# Patient Record
Sex: MALE | Race: OTHER | HISPANIC OR LATINO | Employment: OTHER | URBAN - METROPOLITAN AREA
[De-identification: names, ages, dates, MRNs, and addresses within clinical notes are randomized per-mention and may not be internally consistent; named-entity substitution may affect disease eponyms.]

---

## 2017-01-24 ENCOUNTER — HOSPITAL ENCOUNTER (EMERGENCY)
Facility: HOSPITAL | Age: 52
Discharge: HOME | End: 2017-01-24
Attending: EMERGENCY MEDICINE | Admitting: EMERGENCY MEDICINE
Payer: MEDICARE

## 2017-01-24 ENCOUNTER — APPOINTMENT (EMERGENCY)
Dept: RADIOLOGY | Facility: HOSPITAL | Age: 52
End: 2017-01-24
Payer: MEDICARE

## 2017-01-24 VITALS
BODY MASS INDEX: 24.86 KG/M2 | DIASTOLIC BLOOD PRESSURE: 65 MMHG | HEART RATE: 56 BPM | OXYGEN SATURATION: 99 % | TEMPERATURE: 98.4 F | SYSTOLIC BLOOD PRESSURE: 113 MMHG | RESPIRATION RATE: 16 BRPM | HEIGHT: 68 IN | WEIGHT: 164 LBS

## 2017-01-24 DIAGNOSIS — B34.9 VIRAL SYNDROME: Primary | ICD-10-CM

## 2017-01-24 LAB
ANION GAP SERPL CALCULATED.3IONS-SCNC: 8 MMOL/L (ref 4–13)
BASOPHILS # BLD AUTO: 0.02 THOUSANDS/ΜL (ref 0–0.1)
BASOPHILS NFR BLD AUTO: 0 % (ref 0–1)
BUN SERPL-MCNC: 15 MG/DL (ref 5–25)
CALCIUM SERPL-MCNC: 9.2 MG/DL (ref 8.3–10.1)
CHLORIDE SERPL-SCNC: 103 MMOL/L (ref 100–108)
CO2 SERPL-SCNC: 28 MMOL/L (ref 21–32)
CREAT SERPL-MCNC: 0.88 MG/DL (ref 0.6–1.3)
EOSINOPHIL # BLD AUTO: 0.22 THOUSAND/ΜL (ref 0–0.61)
EOSINOPHIL NFR BLD AUTO: 4 % (ref 0–6)
ERYTHROCYTE [DISTWIDTH] IN BLOOD BY AUTOMATED COUNT: 12.8 % (ref 11.6–15.1)
GFR SERPL CREATININE-BSD FRML MDRD: >60 ML/MIN/1.73SQ M
GLUCOSE SERPL-MCNC: 79 MG/DL (ref 65–140)
HCT VFR BLD AUTO: 47.2 % (ref 36.5–49.3)
HGB BLD-MCNC: 16.4 G/DL (ref 12–17)
LYMPHOCYTES # BLD AUTO: 1.09 THOUSANDS/ΜL (ref 0.6–4.47)
LYMPHOCYTES NFR BLD AUTO: 22 % (ref 14–44)
MCH RBC QN AUTO: 30.3 PG (ref 26.8–34.3)
MCHC RBC AUTO-ENTMCNC: 34.7 G/DL (ref 31.4–37.4)
MCV RBC AUTO: 87 FL (ref 82–98)
MONOCYTES # BLD AUTO: 0.6 THOUSAND/ΜL (ref 0.17–1.22)
MONOCYTES NFR BLD AUTO: 12 % (ref 4–12)
NEUTROPHILS # BLD AUTO: 3.12 THOUSANDS/ΜL (ref 1.85–7.62)
NEUTS SEG NFR BLD AUTO: 62 % (ref 43–75)
NRBC BLD AUTO-RTO: 0 /100 WBCS
PLATELET # BLD AUTO: 133 THOUSANDS/UL (ref 149–390)
PMV BLD AUTO: 10.6 FL (ref 8.9–12.7)
POTASSIUM SERPL-SCNC: 3.8 MMOL/L (ref 3.5–5.3)
RBC # BLD AUTO: 5.42 MILLION/UL (ref 3.88–5.62)
SODIUM SERPL-SCNC: 139 MMOL/L (ref 136–145)
WBC # BLD AUTO: 5.06 THOUSAND/UL (ref 4.31–10.16)

## 2017-01-24 PROCEDURE — 71020 HB CHEST X-RAY 2VW FRONTAL&LATL: CPT

## 2017-01-24 PROCEDURE — 96361 HYDRATE IV INFUSION ADD-ON: CPT

## 2017-01-24 PROCEDURE — 96375 TX/PRO/DX INJ NEW DRUG ADDON: CPT

## 2017-01-24 PROCEDURE — 85025 COMPLETE CBC W/AUTO DIFF WBC: CPT | Performed by: EMERGENCY MEDICINE

## 2017-01-24 PROCEDURE — 99283 EMERGENCY DEPT VISIT LOW MDM: CPT

## 2017-01-24 PROCEDURE — 80048 BASIC METABOLIC PNL TOTAL CA: CPT | Performed by: EMERGENCY MEDICINE

## 2017-01-24 PROCEDURE — 36415 COLL VENOUS BLD VENIPUNCTURE: CPT | Performed by: EMERGENCY MEDICINE

## 2017-01-24 PROCEDURE — 96374 THER/PROPH/DIAG INJ IV PUSH: CPT

## 2017-01-24 RX ORDER — KETOROLAC TROMETHAMINE 30 MG/ML
15 INJECTION, SOLUTION INTRAMUSCULAR; INTRAVENOUS ONCE
Status: COMPLETED | OUTPATIENT
Start: 2017-01-24 | End: 2017-01-24

## 2017-01-24 RX ORDER — ONDANSETRON 4 MG/1
4 TABLET, FILM COATED ORAL EVERY 6 HOURS
Qty: 20 TABLET | Refills: 0 | Status: SHIPPED | OUTPATIENT
Start: 2017-01-24 | End: 2017-10-28

## 2017-01-24 RX ORDER — ONDANSETRON 2 MG/ML
4 INJECTION INTRAMUSCULAR; INTRAVENOUS ONCE
Status: COMPLETED | OUTPATIENT
Start: 2017-01-24 | End: 2017-01-24

## 2017-01-24 RX ADMIN — SODIUM CHLORIDE 1000 ML: 0.9 INJECTION, SOLUTION INTRAVENOUS at 15:19

## 2017-01-24 RX ADMIN — ONDANSETRON 4 MG: 2 INJECTION INTRAMUSCULAR; INTRAVENOUS at 15:49

## 2017-01-24 RX ADMIN — KETOROLAC TROMETHAMINE 15 MG: 30 INJECTION, SOLUTION INTRAMUSCULAR at 15:48

## 2017-01-31 ENCOUNTER — TRANSCRIBE ORDERS (OUTPATIENT)
Dept: LAB | Facility: HOSPITAL | Age: 52
End: 2017-01-31

## 2017-01-31 ENCOUNTER — APPOINTMENT (OUTPATIENT)
Dept: LAB | Facility: HOSPITAL | Age: 52
End: 2017-01-31
Payer: MEDICARE

## 2017-01-31 DIAGNOSIS — E78.00 PURE HYPERCHOLESTEROLEMIA: ICD-10-CM

## 2017-01-31 LAB
ALBUMIN SERPL BCP-MCNC: 3.7 G/DL (ref 3.5–5)
ALP SERPL-CCNC: 54 U/L (ref 46–116)
ALT SERPL W P-5'-P-CCNC: 29 U/L (ref 12–78)
ANION GAP SERPL CALCULATED.3IONS-SCNC: 8 MMOL/L (ref 4–13)
AST SERPL W P-5'-P-CCNC: 14 U/L (ref 5–45)
BASOPHILS # BLD AUTO: 0.01 THOUSANDS/ΜL (ref 0–0.1)
BASOPHILS NFR BLD AUTO: 0 % (ref 0–1)
BILIRUB SERPL-MCNC: 0.49 MG/DL (ref 0.2–1)
BUN SERPL-MCNC: 20 MG/DL (ref 5–25)
CALCIUM SERPL-MCNC: 8.8 MG/DL (ref 8.3–10.1)
CHLORIDE SERPL-SCNC: 106 MMOL/L (ref 100–108)
CHOLEST SERPL-MCNC: 162 MG/DL (ref 50–200)
CO2 SERPL-SCNC: 27 MMOL/L (ref 21–32)
CREAT SERPL-MCNC: 0.91 MG/DL (ref 0.6–1.3)
EOSINOPHIL # BLD AUTO: 0.7 THOUSAND/ΜL (ref 0–0.61)
EOSINOPHIL NFR BLD AUTO: 7 % (ref 0–6)
ERYTHROCYTE [DISTWIDTH] IN BLOOD BY AUTOMATED COUNT: 12.7 % (ref 11.6–15.1)
GFR SERPL CREATININE-BSD FRML MDRD: >60 ML/MIN/1.73SQ M
GLUCOSE SERPL-MCNC: 83 MG/DL (ref 65–140)
HCT VFR BLD AUTO: 45.4 % (ref 36.5–49.3)
HDLC SERPL-MCNC: 42 MG/DL (ref 40–60)
HGB BLD-MCNC: 15.7 G/DL (ref 12–17)
LDLC SERPL CALC-MCNC: 104 MG/DL (ref 0–100)
LYMPHOCYTES # BLD AUTO: 4.32 THOUSANDS/ΜL (ref 0.6–4.47)
LYMPHOCYTES NFR BLD AUTO: 46 % (ref 14–44)
MCH RBC QN AUTO: 30.4 PG (ref 26.8–34.3)
MCHC RBC AUTO-ENTMCNC: 34.6 G/DL (ref 31.4–37.4)
MCV RBC AUTO: 88 FL (ref 82–98)
MONOCYTES # BLD AUTO: 0.52 THOUSAND/ΜL (ref 0.17–1.22)
MONOCYTES NFR BLD AUTO: 5 % (ref 4–12)
NEUTROPHILS # BLD AUTO: 4.04 THOUSANDS/ΜL (ref 1.85–7.62)
NEUTS SEG NFR BLD AUTO: 42 % (ref 43–75)
NRBC BLD AUTO-RTO: 0 /100 WBCS
PLATELET # BLD AUTO: 245 THOUSANDS/UL (ref 149–390)
PMV BLD AUTO: 10.6 FL (ref 8.9–12.7)
POTASSIUM SERPL-SCNC: 3.8 MMOL/L (ref 3.5–5.3)
PROT SERPL-MCNC: 7.3 G/DL (ref 6.4–8.2)
RBC # BLD AUTO: 5.17 MILLION/UL (ref 3.88–5.62)
SODIUM SERPL-SCNC: 141 MMOL/L (ref 136–145)
TRIGL SERPL-MCNC: 78 MG/DL
TSH SERPL DL<=0.05 MIU/L-ACNC: 2.05 UIU/ML (ref 0.36–3.74)
WBC # BLD AUTO: 9.66 THOUSAND/UL (ref 4.31–10.16)

## 2017-01-31 PROCEDURE — 80053 COMPREHEN METABOLIC PANEL: CPT

## 2017-01-31 PROCEDURE — 84443 ASSAY THYROID STIM HORMONE: CPT

## 2017-01-31 PROCEDURE — 36415 COLL VENOUS BLD VENIPUNCTURE: CPT

## 2017-01-31 PROCEDURE — 80061 LIPID PANEL: CPT

## 2017-01-31 PROCEDURE — 85025 COMPLETE CBC W/AUTO DIFF WBC: CPT

## 2017-02-04 ENCOUNTER — GENERIC CONVERSION - ENCOUNTER (OUTPATIENT)
Dept: OTHER | Facility: OTHER | Age: 52
End: 2017-02-04

## 2017-04-07 ENCOUNTER — ALLSCRIPTS OFFICE VISIT (OUTPATIENT)
Dept: OTHER | Facility: OTHER | Age: 52
End: 2017-04-07

## 2017-05-05 ENCOUNTER — ALLSCRIPTS OFFICE VISIT (OUTPATIENT)
Dept: OTHER | Facility: OTHER | Age: 52
End: 2017-05-05

## 2017-06-20 ENCOUNTER — ALLSCRIPTS OFFICE VISIT (OUTPATIENT)
Dept: OTHER | Facility: OTHER | Age: 52
End: 2017-06-20

## 2017-06-20 DIAGNOSIS — Z12.5 ENCOUNTER FOR SCREENING FOR MALIGNANT NEOPLASM OF PROSTATE: ICD-10-CM

## 2017-06-20 DIAGNOSIS — N64.4 MASTODYNIA: ICD-10-CM

## 2017-06-20 DIAGNOSIS — N62 HYPERTROPHY OF BREAST: ICD-10-CM

## 2017-06-20 DIAGNOSIS — E78.00 PURE HYPERCHOLESTEROLEMIA: ICD-10-CM

## 2017-06-20 DIAGNOSIS — N63.0 BREAST LUMP: ICD-10-CM

## 2017-06-20 DIAGNOSIS — M54.50 LOW BACK PAIN: ICD-10-CM

## 2017-06-30 ENCOUNTER — GENERIC CONVERSION - ENCOUNTER (OUTPATIENT)
Dept: OTHER | Facility: OTHER | Age: 52
End: 2017-06-30

## 2017-06-30 ENCOUNTER — HOSPITAL ENCOUNTER (OUTPATIENT)
Dept: ULTRASOUND IMAGING | Facility: CLINIC | Age: 52
Discharge: HOME | End: 2017-06-30
Payer: MEDICARE

## 2017-06-30 ENCOUNTER — HOSPITAL ENCOUNTER (OUTPATIENT)
Dept: MAMMOGRAPHY | Facility: CLINIC | Age: 52
Discharge: HOME | End: 2017-06-30
Payer: MEDICARE

## 2017-06-30 DIAGNOSIS — N64.4 MASTODYNIA: ICD-10-CM

## 2017-06-30 DIAGNOSIS — IMO0002 LUMP: ICD-10-CM

## 2017-06-30 DIAGNOSIS — N63.0 BREAST LUMP: ICD-10-CM

## 2017-06-30 PROCEDURE — G0279 TOMOSYNTHESIS, MAMMO: HCPCS

## 2017-06-30 PROCEDURE — 76642 ULTRASOUND BREAST LIMITED: CPT

## 2017-06-30 PROCEDURE — G0204 DX MAMMO INCL CAD BI: HCPCS

## 2017-07-19 ENCOUNTER — ALLSCRIPTS OFFICE VISIT (OUTPATIENT)
Dept: OTHER | Facility: OTHER | Age: 52
End: 2017-07-19

## 2017-10-28 ENCOUNTER — HOSPITAL ENCOUNTER (OUTPATIENT)
Facility: HOSPITAL | Age: 52
Setting detail: OBSERVATION
Discharge: HOME | End: 2017-10-29
Attending: EMERGENCY MEDICINE | Admitting: INTERNAL MEDICINE
Payer: MEDICARE

## 2017-10-28 ENCOUNTER — APPOINTMENT (EMERGENCY)
Dept: RADIOLOGY | Facility: HOSPITAL | Age: 52
End: 2017-10-28
Payer: MEDICARE

## 2017-10-28 DIAGNOSIS — R07.9 CHEST PAIN: Primary | ICD-10-CM

## 2017-10-28 PROBLEM — E78.49 OTHER HYPERLIPIDEMIA: Status: ACTIVE | Noted: 2017-10-28

## 2017-10-28 PROBLEM — R61 DIAPHORESIS: Status: ACTIVE | Noted: 2017-10-28

## 2017-10-28 PROBLEM — G89.29 CHRONIC LOW BACK PAIN: Status: ACTIVE | Noted: 2017-10-28

## 2017-10-28 PROBLEM — M54.50 CHRONIC LOW BACK PAIN: Status: ACTIVE | Noted: 2017-10-28

## 2017-10-28 LAB
ALBUMIN SERPL BCP-MCNC: 3.7 G/DL (ref 3.5–5)
ALP SERPL-CCNC: 59 U/L (ref 46–116)
ALT SERPL W P-5'-P-CCNC: 22 U/L (ref 12–78)
ANION GAP SERPL CALCULATED.3IONS-SCNC: 7 MMOL/L (ref 4–13)
AST SERPL W P-5'-P-CCNC: 15 U/L (ref 5–45)
BASOPHILS # BLD AUTO: 0.03 THOUSANDS/ΜL (ref 0–0.1)
BASOPHILS NFR BLD AUTO: 0 % (ref 0–1)
BILIRUB SERPL-MCNC: 0.18 MG/DL (ref 0.2–1)
BUN SERPL-MCNC: 20 MG/DL (ref 5–25)
CALCIUM SERPL-MCNC: 8.5 MG/DL (ref 8.3–10.1)
CHLORIDE SERPL-SCNC: 103 MMOL/L (ref 100–108)
CO2 SERPL-SCNC: 28 MMOL/L (ref 21–32)
CREAT SERPL-MCNC: 1.21 MG/DL (ref 0.6–1.3)
EOSINOPHIL # BLD AUTO: 1.41 THOUSAND/ΜL (ref 0–0.61)
EOSINOPHIL NFR BLD AUTO: 12 % (ref 0–6)
ERYTHROCYTE [DISTWIDTH] IN BLOOD BY AUTOMATED COUNT: 13 % (ref 11.6–15.1)
GFR SERPL CREATININE-BSD FRML MDRD: 68 ML/MIN/1.73SQ M
GLUCOSE SERPL-MCNC: 163 MG/DL (ref 65–140)
HCT VFR BLD AUTO: 42.9 % (ref 36.5–49.3)
HGB BLD-MCNC: 15.3 G/DL (ref 12–17)
LYMPHOCYTES # BLD AUTO: 3.04 THOUSANDS/ΜL (ref 0.6–4.47)
LYMPHOCYTES NFR BLD AUTO: 25 % (ref 14–44)
MCH RBC QN AUTO: 30.9 PG (ref 26.8–34.3)
MCHC RBC AUTO-ENTMCNC: 35.7 G/DL (ref 31.4–37.4)
MCV RBC AUTO: 87 FL (ref 82–98)
MONOCYTES # BLD AUTO: 0.53 THOUSAND/ΜL (ref 0.17–1.22)
MONOCYTES NFR BLD AUTO: 4 % (ref 4–12)
NEUTROPHILS # BLD AUTO: 6.97 THOUSANDS/ΜL (ref 1.85–7.62)
NEUTS SEG NFR BLD AUTO: 59 % (ref 43–75)
NRBC BLD AUTO-RTO: 0 /100 WBCS
PLATELET # BLD AUTO: 165 THOUSANDS/UL (ref 149–390)
PMV BLD AUTO: 10.4 FL (ref 8.9–12.7)
POTASSIUM SERPL-SCNC: 3.5 MMOL/L (ref 3.5–5.3)
PROT SERPL-MCNC: 6.8 G/DL (ref 6.4–8.2)
RBC # BLD AUTO: 4.95 MILLION/UL (ref 3.88–5.62)
SODIUM SERPL-SCNC: 138 MMOL/L (ref 136–145)
SPECIMEN SOURCE: NORMAL
TROPONIN I BLD-MCNC: 0 NG/ML (ref 0–0.08)
WBC # BLD AUTO: 12.01 THOUSAND/UL (ref 4.31–10.16)

## 2017-10-28 PROCEDURE — 85025 COMPLETE CBC W/AUTO DIFF WBC: CPT | Performed by: EMERGENCY MEDICINE

## 2017-10-28 PROCEDURE — 93005 ELECTROCARDIOGRAM TRACING: CPT | Performed by: EMERGENCY MEDICINE

## 2017-10-28 PROCEDURE — 80053 COMPREHEN METABOLIC PANEL: CPT | Performed by: EMERGENCY MEDICINE

## 2017-10-28 PROCEDURE — 36415 COLL VENOUS BLD VENIPUNCTURE: CPT

## 2017-10-28 PROCEDURE — 84484 ASSAY OF TROPONIN QUANT: CPT

## 2017-10-28 PROCEDURE — 71020 HB CHEST X-RAY 2VW FRONTAL&LATL: CPT | Performed by: EMERGENCY MEDICINE

## 2017-10-28 RX ORDER — FENTANYL 100 UG/H
1 PATCH TRANSDERMAL
COMMUNITY
End: 2018-01-30 | Stop reason: SDUPTHER

## 2017-10-28 RX ORDER — ATORVASTATIN CALCIUM 10 MG/1
10 TABLET, FILM COATED ORAL DAILY
COMMUNITY
End: 2018-05-19 | Stop reason: SDUPTHER

## 2017-10-29 VITALS
BODY MASS INDEX: 24.83 KG/M2 | DIASTOLIC BLOOD PRESSURE: 59 MMHG | HEART RATE: 42 BPM | WEIGHT: 163.8 LBS | OXYGEN SATURATION: 97 % | TEMPERATURE: 97.4 F | RESPIRATION RATE: 16 BRPM | HEIGHT: 68 IN | SYSTOLIC BLOOD PRESSURE: 104 MMHG

## 2017-10-29 LAB
ALBUMIN SERPL BCP-MCNC: 3.1 G/DL (ref 3.5–5)
ALP SERPL-CCNC: 53 U/L (ref 46–116)
ALT SERPL W P-5'-P-CCNC: 19 U/L (ref 12–78)
ANION GAP SERPL CALCULATED.3IONS-SCNC: 5 MMOL/L (ref 4–13)
AST SERPL W P-5'-P-CCNC: 13 U/L (ref 5–45)
BILIRUB SERPL-MCNC: 0.3 MG/DL (ref 0.2–1)
BUN SERPL-MCNC: 17 MG/DL (ref 5–25)
CALCIUM SERPL-MCNC: 8.2 MG/DL (ref 8.3–10.1)
CHLORIDE SERPL-SCNC: 107 MMOL/L (ref 100–108)
CHOLEST SERPL-MCNC: 144 MG/DL (ref 50–200)
CO2 SERPL-SCNC: 27 MMOL/L (ref 21–32)
CREAT SERPL-MCNC: 0.85 MG/DL (ref 0.6–1.3)
ERYTHROCYTE [DISTWIDTH] IN BLOOD BY AUTOMATED COUNT: 13.1 % (ref 11.6–15.1)
GFR SERPL CREATININE-BSD FRML MDRD: 100 ML/MIN/1.73SQ M
GLUCOSE P FAST SERPL-MCNC: 79 MG/DL (ref 65–99)
GLUCOSE SERPL-MCNC: 79 MG/DL (ref 65–140)
HCT VFR BLD AUTO: 39.5 % (ref 36.5–49.3)
HDLC SERPL-MCNC: 31 MG/DL (ref 40–60)
HGB BLD-MCNC: 13.7 G/DL (ref 12–17)
LDLC SERPL CALC-MCNC: 82 MG/DL (ref 0–100)
MAGNESIUM SERPL-MCNC: 2.2 MG/DL (ref 1.6–2.6)
MCH RBC QN AUTO: 30.2 PG (ref 26.8–34.3)
MCHC RBC AUTO-ENTMCNC: 34.7 G/DL (ref 31.4–37.4)
MCV RBC AUTO: 87 FL (ref 82–98)
PHOSPHATE SERPL-MCNC: 4 MG/DL (ref 2.7–4.5)
PLATELET # BLD AUTO: 156 THOUSANDS/UL (ref 149–390)
PMV BLD AUTO: 10.9 FL (ref 8.9–12.7)
POTASSIUM SERPL-SCNC: 3.9 MMOL/L (ref 3.5–5.3)
PROT SERPL-MCNC: 5.9 G/DL (ref 6.4–8.2)
RBC # BLD AUTO: 4.53 MILLION/UL (ref 3.88–5.62)
SODIUM SERPL-SCNC: 139 MMOL/L (ref 136–145)
TRIGL SERPL-MCNC: 153 MG/DL
TROPONIN I SERPL-MCNC: <0.02 NG/ML
TROPONIN I SERPL-MCNC: <0.02 NG/ML
WBC # BLD AUTO: 10.33 THOUSAND/UL (ref 4.31–10.16)

## 2017-10-29 PROCEDURE — 90686 IIV4 VACC NO PRSV 0.5 ML IM: CPT | Performed by: INTERNAL MEDICINE

## 2017-10-29 PROCEDURE — 80053 COMPREHEN METABOLIC PANEL: CPT | Performed by: INTERNAL MEDICINE

## 2017-10-29 PROCEDURE — 85027 COMPLETE CBC AUTOMATED: CPT | Performed by: INTERNAL MEDICINE

## 2017-10-29 PROCEDURE — 84100 ASSAY OF PHOSPHORUS: CPT | Performed by: INTERNAL MEDICINE

## 2017-10-29 PROCEDURE — G0008 ADMIN INFLUENZA VIRUS VAC: HCPCS | Performed by: INTERNAL MEDICINE

## 2017-10-29 PROCEDURE — 84484 ASSAY OF TROPONIN QUANT: CPT | Performed by: INTERNAL MEDICINE

## 2017-10-29 PROCEDURE — 80061 LIPID PANEL: CPT | Performed by: INTERNAL MEDICINE

## 2017-10-29 PROCEDURE — 93005 ELECTROCARDIOGRAM TRACING: CPT

## 2017-10-29 PROCEDURE — 83735 ASSAY OF MAGNESIUM: CPT | Performed by: INTERNAL MEDICINE

## 2017-10-29 PROCEDURE — 99285 EMERGENCY DEPT VISIT HI MDM: CPT

## 2017-10-29 RX ORDER — DOCUSATE SODIUM 100 MG/1
100 CAPSULE, LIQUID FILLED ORAL 2 TIMES DAILY PRN
Status: DISCONTINUED | OUTPATIENT
Start: 2017-10-29 | End: 2017-10-29 | Stop reason: HOSPADM

## 2017-10-29 RX ORDER — MAGNESIUM HYDROXIDE/ALUMINUM HYDROXICE/SIMETHICONE 120; 1200; 1200 MG/30ML; MG/30ML; MG/30ML
30 SUSPENSION ORAL EVERY 6 HOURS PRN
Status: DISCONTINUED | OUTPATIENT
Start: 2017-10-29 | End: 2017-10-29 | Stop reason: HOSPADM

## 2017-10-29 RX ORDER — NICOTINE 21 MG/24HR
1 PATCH, TRANSDERMAL 24 HOURS TRANSDERMAL DAILY
Status: DISCONTINUED | OUTPATIENT
Start: 2017-10-29 | End: 2017-10-29 | Stop reason: HOSPADM

## 2017-10-29 RX ORDER — FENTANYL 100 UG/H
1 PATCH TRANSDERMAL
Status: DISCONTINUED | OUTPATIENT
Start: 2017-10-29 | End: 2017-10-29 | Stop reason: DRUGHIGH

## 2017-10-29 RX ORDER — ACETAMINOPHEN 325 MG/1
650 TABLET ORAL EVERY 4 HOURS PRN
Status: DISCONTINUED | OUTPATIENT
Start: 2017-10-29 | End: 2017-10-29 | Stop reason: HOSPADM

## 2017-10-29 RX ORDER — ONDANSETRON 2 MG/ML
4 INJECTION INTRAMUSCULAR; INTRAVENOUS EVERY 6 HOURS PRN
Status: DISCONTINUED | OUTPATIENT
Start: 2017-10-29 | End: 2017-10-29 | Stop reason: HOSPADM

## 2017-10-29 RX ORDER — ATORVASTATIN CALCIUM 10 MG/1
10 TABLET, FILM COATED ORAL DAILY
Status: DISCONTINUED | OUTPATIENT
Start: 2017-10-29 | End: 2017-10-29 | Stop reason: HOSPADM

## 2017-10-29 RX ORDER — FENTANYL 100 UG/H
1 PATCH TRANSDERMAL
Status: DISCONTINUED | OUTPATIENT
Start: 2017-10-31 | End: 2017-10-29 | Stop reason: HOSPADM

## 2017-10-29 RX ORDER — SODIUM CHLORIDE AND POTASSIUM CHLORIDE .9; .15 G/100ML; G/100ML
100 SOLUTION INTRAVENOUS CONTINUOUS
Status: DISCONTINUED | OUTPATIENT
Start: 2017-10-29 | End: 2017-10-29 | Stop reason: HOSPADM

## 2017-10-29 RX ADMIN — INFLUENZA VIRUS VACCINE 0.5 ML: 15; 15; 15; 15 SUSPENSION INTRAMUSCULAR at 14:24

## 2017-10-29 RX ADMIN — SODIUM CHLORIDE AND POTASSIUM CHLORIDE 100 ML/HR: .9; .15 SOLUTION INTRAVENOUS at 01:37

## 2017-10-29 RX ADMIN — ENOXAPARIN SODIUM 40 MG: 40 INJECTION SUBCUTANEOUS at 10:21

## 2017-10-29 RX ADMIN — ATORVASTATIN CALCIUM 10 MG: 10 TABLET, FILM COATED ORAL at 12:55

## 2017-10-29 RX ADMIN — NICOTINE 1 PATCH: 14 PATCH, EXTENDED RELEASE TRANSDERMAL at 10:22

## 2017-10-29 NOTE — PROGRESS NOTES
[facility] Internal Medicine Progress Note  Patient: Mike Godinez 45 y o  male   MRN: [id number]  PCP: Brenda Jordan DO  Unit/Bed#: [facility] 521-01 Encounter: [id number]  Date Of Visit: 10/29/17    Assessment:    Principal Problem:    Chest pain in adult  Active Problems:    Other hyperlipidemia    Chronic low back pain    Diaphoresis      Plan:    · Chest pain-continue with telemetry  Serial enzymes  CARLA score less than 2  Outpatient stress  · Hyperlipidemia-Lipitor  · Chronic back pain-continue with current regimen  VTE Pharmacologic Prophylas:   Pharmacologic: Enoxaparin (Lovenox)  Mechanical VTE Prophylaxis in Place: Yes    Patient Centered Rounds: I have performed bedside rounds with nursing staff today  Time Spent for Care: 15 minutes  More than 50% of total time spent on counseling and coordination of care as described above  Current Length of Stay: 0 day(s)    Current Patient Status: Observation   Certification Statement: The patient will continue to require additional inpatient hospital stay due to Need to monitor symptoms      Code Status: Level 1 - Full Code      Subjective:   nad    Objective:     Vitals:   Temp (24hrs), Av 4 °F (36 9 °C), Min:97 7 °F (36 5 °C), Max:98 8 °F (37 1 °C)    HR:  [57-88] 57  Resp:  [16-18] 16  BP: ()/(52-73) 102/55  SpO2:  [94 %-96 %] 95 %  Body mass index is 24 91 kg/m²  Input and Output Summary (last 24 hours):        Intake/Output Summary (Last 24 hours) at 10/29/17 0907  Last data filed at 10/29/17 9780   Gross per 24 hour   Intake              815 ml   Output              425 ml   Net              390 ml       Physical Exam:     Physical Exam        Additional Data:     Labs:      Results from last 7 days  Lab Units 10/29/17  0533 10/28/17  2107   WBC Thousand/uL 10 33* 12 01*   HEMOGLOBIN g/dL 13 7 15 3   HEMATOCRIT % 39 5 42 9   PLATELETS Thousands/uL 156 165   NEUTROS PCT %  --  59   LYMPHS PCT %  --  25   MONOS PCT %  --  4   EOS PCT %  --  12*       Results from last 7 days  Lab Units 10/29/17  0533   SODIUM mmol/L 139   POTASSIUM mmol/L 3 9   CHLORIDE mmol/L 107   CO2 mmol/L 27   BUN mg/dL 17   CREATININE mg/dL 0 85   CALCIUM mg/dL 8 2*   TOTAL PROTEIN g/dL 5 9*   BILIRUBIN TOTAL mg/dL 0 30   ALK PHOS U/L 53   ALT U/L 19   AST U/L 13   GLUCOSE RANDOM mg/dL 79           * I Have Reviewed All Lab Data Listed Above  * Additional Pertinent Lab Tests Reviewed: All Labs Within Last 24 Hours Reviewed        Recent Cultures (last 7 days):           Last 24 Hours Medication List:     atorvastatin 10 mg Oral Daily   enoxaparin 40 mg Subcutaneous Daily   [START ON 10/31/2017] fentaNYL 1 patch Transdermal Q72H   nicotine 1 patch Transdermal Daily        Today, Patient Was Seen By: Mae Ellsworth DO    ** Please Note: This note has been constructed using a voice recognition system   **

## 2017-10-29 NOTE — ED PROVIDER NOTES
History  Chief Complaint   Patient presents with    Chest Pain     per EMS pt reported L hand pain, felt tachycardic with SOB and L sided chest pain  Pt reported his pain a 7 on a scale of 0 to 10 and pt's pain persists at a 7 after 324 of asa and 1 sublingual nitro  HPI  Patient is a 76-year-old male with history of aortic atherosclerosis, hyperlipidemia, 30 years of smoking patient who presents emergency department deny with racing heartbeat with chest pain  Patient states he was in usual state of health, talking with his sister on a couch when he had acute onset of feeling of racing heartbeat with associated pain and shortness of breath  Patient states he got up to walk around thinking that it would go away and it has not resolved  He describes the pain as left-sided chest pain does not radiate anywhere  When asking him to describe it he states pumping  It was associated with pain in his left hand  It also made him lightheaded, but he denies passing out or syncope  Patient had a home blood pressure cuff and his sister took his blood pressure and stated it was I89/106 with a pulse of 130  When they called EMS, he was given nitro and aspirin with resolution of symptoms  Patient states that he has had similar symptoms for the last 7 months  He states that he will have these episodes of subjective feeling of racing heartbeat with pressure that will last for seconds to minutes  This was the episode that lasted the longest   Patient denies any history of heart attack  He does have cholesterol and has smoked for greater than 30 years  Denies any history of hypertension or diabetes  He does see a primary care doctor at [facility]  Patient is otherwise healthy, denies any fevers chills, recent illness, headache vision changes neck pain, abdominal pain, diarrhea  Prior to Admission Medications   Prescriptions Last Dose Informant Patient Reported?  Taking?   atorvastatin (LIPITOR) 10 mg tablet   Yes Yes   Sig: Take 10 mg by mouth daily   fentaNYL (DURAGESIC) 100 mcg/hr TD 72 hr patch 10/28/2017 at 0900  Yes Yes   Sig: Place 1 patch on the skin every third day      Facility-Administered Medications: None       Past Medical History:   Diagnosis Date    Chronic back pain     Hyperlipidemia        Past Surgical History:   Procedure Laterality Date    BACK SURGERY         History reviewed  No pertinent family history  I have reviewed and agree with the history as documented  Social History   Substance Use Topics    Smoking status: Current Every Day Smoker     Packs/day: 0 25     Types: Cigarettes    Smokeless tobacco: Never Used      Comment: declined    Alcohol use No        Review of Systems   Constitutional: Negative  HENT: Negative  Eyes: Negative  Respiratory: Positive for chest tightness and shortness of breath  Cardiovascular: Positive for chest pain and palpitations  Gastrointestinal: Negative  Endocrine: Negative  Genitourinary: Negative  Musculoskeletal: Negative  Skin: Negative  Allergic/Immunologic: Negative  Neurological: Negative  Hematological: Negative  Psychiatric/Behavioral: Negative  Physical Exam  ED Triage Vitals   Temperature Pulse Respirations Blood Pressure SpO2   10/28/17 2054 10/28/17 2054 10/28/17 2054 10/28/17 2054 10/28/17 2054   98 2 °F (36 8 °C) 88 18 143/73 95 %      Temp Source Heart Rate Source Patient Position - Orthostatic VS BP Location FiO2 (%)   10/28/17 2054 10/28/17 2229 10/28/17 2229 10/28/17 2054 --   Oral Monitor Lying Left arm       Pain Score       10/28/17 2054       7           Orthostatic Vital Signs  Vitals:    10/29/17 0108 10/29/17 0300 10/29/17 0658 10/29/17 1047   BP: 109/69 101/52 102/55 104/59   Pulse: 61 61 57 (!) 42   Patient Position - Orthostatic VS:           Physical Exam   Constitutional: He is oriented to person, place, and time  He appears well-developed and well-nourished   No distress  HENT:   Head: Normocephalic and atraumatic  Right Ear: External ear normal    Left Ear: External ear normal    Mouth/Throat: Oropharynx is clear and moist    Eyes: Conjunctivae and EOM are normal  Pupils are equal, round, and reactive to light  Right eye exhibits no discharge  Left eye exhibits no discharge  No scleral icterus  Neck: Normal range of motion  Neck supple  No tracheal deviation present  No thyromegaly present  Cardiovascular: Normal rate, regular rhythm and intact distal pulses  Exam reveals no gallop and no friction rub  No murmur heard  Pulmonary/Chest: Effort normal and breath sounds normal  No stridor  No respiratory distress  He has no wheezes  He has no rales  Abdominal: Soft  Bowel sounds are normal  He exhibits no distension  There is no tenderness  There is no rebound and no guarding  Musculoskeletal: Normal range of motion  He exhibits no edema or deformity  Neurological: He is alert and oriented to person, place, and time  No cranial nerve deficit  Skin: Skin is warm and dry  No rash noted  He is not diaphoretic  No erythema  Psychiatric: He has a normal mood and affect  His behavior is normal  Thought content normal    Nursing note and vitals reviewed        ED Medications  Medications   influenza inactivated quadrivalent vaccine (FLULAVAL) IM injection 0 5 mL (0 5 mL Intramuscular Given 10/29/17 1424)       Diagnostic Studies  Results Reviewed     Procedure Component Value Units Date/Time    Comprehensive metabolic panel [[id number]]  (Abnormal) Collected:  10/28/17 2107    Lab Status:  Final result Specimen:  Blood from Arm, Left Updated:  10/28/17 2135     Sodium 138 mmol/L      Potassium 3 5 mmol/L      Chloride 103 mmol/L      CO2 28 mmol/L      Anion Gap 7 mmol/L      BUN 20 mg/dL      Creatinine 1 21 mg/dL      Glucose 163 (H) mg/dL      Calcium 8 5 mg/dL      AST 15 U/L      ALT 22 U/L      Alkaline Phosphatase 59 U/L      Total Protein 6 8 g/dL Albumin 3 7 g/dL      Total Bilirubin 0 18 (L) mg/dL      eGFR 68 ml/min/1 73sq m     Narrative:         National Kidney Disease Education Program recommendations are as follows:  GFR calculation is accurate only with a steady state creatinine  Chronic Kidney disease less than 60 ml/min/1 73 sq  meters  Kidney failure less than 15 ml/min/1 73 sq  meters  POCT troponin [[id number]]  (Normal) Collected:  10/28/17 2109    Lab Status:  Final result Updated:  10/28/17 2122     POC Troponin I 0 00 ng/ml      Specimen Type VENOUS    Narrative:         [employer] i-Stat handheld analyzer 99% cutoff is > 0 08ng/mL in [facility] Emergency Departments    o cTnI 99% cutoff is useful only when applied to patients in the clinical setting of myocardial ischemia  o cTnI 99% cutoff should be interpreted in the context of clinical history, ECG findings and possibly cardiac imaging to establish correct diagnosis  o cTnI 99% cutoff may be suggestive but clearly not indicative of a coronary event without the clinical setting of myocardial ischemia      CBC and differential [[id number]]  (Abnormal) Collected:  10/28/17 2107    Lab Status:  Final result Specimen:  Blood from Arm, Left Updated:  10/28/17 2114     WBC 12 01 (H) Thousand/uL      RBC 4 95 Million/uL      Hemoglobin 15 3 g/dL      Hematocrit 42 9 %      MCV 87 fL      MCH 30 9 pg      MCHC 35 7 g/dL      RDW 13 0 %      MPV 10 4 fL      Platelets 019 Thousands/uL      nRBC 0 /100 WBCs      Neutrophils Relative 59 %      Lymphocytes Relative 25 %      Monocytes Relative 4 %      Eosinophils Relative 12 (H) %      Basophils Relative 0 %      Neutrophils Absolute 6 97 Thousands/µL      Lymphocytes Absolute 3 04 Thousands/µL      Monocytes Absolute 0 53 Thousand/µL      Eosinophils Absolute 1 41 (H) Thousand/µL      Basophils Absolute 0 03 Thousands/µL                  X-ray chest 2 views   ED Interpretation by Jahaira Thompson MD (10/28 2227)   No consolidation or pnx on my reading Final Result by Amna Evans MD (10/29 1042)      No active pulmonary disease  Workstation performed: [id number]               Procedures  ECG 12 Lead Documentation  Date/Time: 10/28/2017 9:32 PM  Performed by: Arlyce Hammans  Authorized by: Asya Dahl     Indications / Diagnosis:  Chest pain  ECG reviewed by me, the ED Provider: yes    Patient location:  ED  Previous ECG:     Previous ECG:  Compared to current    Comparison ECG info:  Flipped Ts in V1 V2    Similarity:  Changes noted    Comparison to cardiac monitor: Yes    Interpretation:     Interpretation: non-specific    Rate:     ECG rate assessment: normal    Rhythm:     Rhythm: sinus rhythm    Ectopy:     Ectopy: none    QRS:     QRS axis:  Normal  Conduction:     Conduction: normal    ST segments:     ST segments:  Normal  T waves:     T waves comment:  Flipped in V1 and V2          Phone Consults  ED Phone Contact    ED Course  ED Course          HEART Risk Score    Flowsheet Row Most Recent Value   History  1 Filed at: 10/28/2017 2203   ECG  1 Filed at: 10/28/2017 2203   Age  1 Filed at: 10/28/2017 2203   Risk Factors  1 Filed at: 10/28/2017 2203   Troponin  No data   Heart Score Risk Calculator   History  1 Filed at: 10/28/2017 2203   ECG  1 Filed at: 10/28/2017 2203   Age  1 Filed at: 10/28/2017 2203   Risk Factors  1 Filed at: 10/28/2017 2203   Troponin  No data   HEART Score  No data   HEART Score  No data                            MDM  Number of Diagnoses or Management Options  Diagnosis management comments: 18-year-old man with multiple cardiac risk factors  Will get a full set of labs including troponin as well as get EKG and chest x-ray  Given his history, will admit to the hospital for chest pain observation      CritCare Time    Disposition  Final diagnoses:   Chest pain     Time reflects when diagnosis was documented in both MDM as applicable and the Disposition within this note     Time User Action Codes Description Comment    11/7/2017  3:21 PM Sarthak Lopez Add [R07 9] Chest pain       ED Disposition     ED Disposition Condition Comment    Admit  Pt was admitted tot he [facility] service        Follow-up Information     Follow up With Specialties Details Why Contact Info    Ever Youngblood, DO Family Medicine Follow up in 1 week(s)  [address]  [address] [place] [address] [address]   [phone]          Discharge Medication List as of 10/29/2017  2:31 PM      START taking these medications    Details   influenza inactivated quadrivalent vaccine (FLULAVAL) 0 5 ML JYOTSNA Inject 0 5 mL into the shoulder, thigh, or buttocks once for 1 dose, Starting Sun 10/29/2017, Print         CONTINUE these medications which have NOT CHANGED    Details   atorvastatin (LIPITOR) 10 mg tablet Take 10 mg by mouth daily, Historical Med      fentaNYL (DURAGESIC) 100 mcg/hr TD 72 hr patch Place 1 patch on the skin every third day, Historical Med             Outpatient Discharge Orders  Activity as tolerated         ED Provider  Attending physically available and evaluated Kiet Jacobs I managed the patient along with the ED Attending      Electronically Signed by         Javier Suero MD  Resident  11/07/17 2052

## 2017-10-29 NOTE — ED ATTENDING ATTESTATION
Hali Guevara MD, saw and evaluated the patient  I have discussed the patient with the resident/non-physician practitioner and agree with the resident's/non-physician practitioner's findings, Plan of Care, and MDM as documented in the resident's/non-physician practitioner's note, except where noted  All available labs and Radiology studies were reviewed  At this point I agree with the current assessment done in the Emergency Department  I have conducted an independent evaluation of this patient a history and physical is as follows: This 50-year-old male presents with chest pain, left-sided, radiating down the left arm that began approximately an hour prior to arrival   Patient states he was sitting in a chair watching a video when symptoms began  Patient describes it as a throbbing type sensation  Patient denies any diaphoresis, vomiting  Patient does describe feeling short of breath  Patient also felt as though his heart was racing  Family checked his blood pressure on a home monitor and found his heart rate to be 130, blood pressure 496 systolic  EMS gave patient nitro and aspirin, on arrival here patient symptoms much improved  EKG is unremarkable for any acute ischemia  Exam patient is awake alert and oriented, regular heart sounds, clear lung sounds, in no distress  Patient's workup here is unremarkable including EKG, chest x-ray, troponin  Patient does have an elevated blood sugar of 163  Patient however has a heart score of four  Patient will be admitted to observation for further evaluation of this chest pain    Critical Care Time  CritCare Time

## 2017-10-29 NOTE — CASE MANAGEMENT
Initial Clinical Review    Admission: Date/Time/Statement: 10/28/17 @ 2350 -- OBS    Orders Placed This Encounter   Procedures    Place in Observation (expected length of stay for this patient is less than two midnights)     Standing Status:   Standing     Number of Occurrences:   1     Order Specific Question:   Admitting Physician     Answer:   Tanya Quinn [[id number]]     Order Specific Question:   Level of Care     Answer:   Med Surg [16]    Place in Observation     Standing Status:   Standing     Number of Occurrences:   1     Order Specific Question:   Admitting Physician     Answer:   Tanya Quinn [[id number]]     Order Specific Question:   Level of Care     Answer:   Med Surg [16]       ED: Date/Time/Mode of Arrival:   ED Arrival Information     Expected Arrival Acuity Means of Arrival Escorted By Service Admission Type    - 10/28/2017 20:45 Emergent Ambulance [facility] Emergency    Arrival Complaint    CHEST PAIN          Chief Complaint:   Chief Complaint   Patient presents with    Chest Pain     per EMS pt reported L hand pain, felt tachycardic with SOB and L sided chest pain  Pt reported his pain a 7 on a scale of 0 to 10 and pt's pain persists at a 7 after 324 of asa and 1 sublingual nitro  History of Illness:  45 y o  male who has a past medical history significant for chronic back pain for which he receives fentanyl patches once every 3 days  (It seems this is fentanyl 100 and a 50 patch applied every 3 days however in medication reconciliation is only 100 patch every 3 days )  In any case, the patient comes in because of complaints of palpitations and diaphoresis  Essentially, the patient was not doing anything at the time of appearance which is 7 o'clock  He said that it lasted for 20 minutes  He describes the palpitations more as a pumping action and that made him concerned  His BP was taken by the sister who was with him and reportedly it was high    He could not recall the exact numbers however in the notes of emergency room physician was 189/106  He says that there was increased sweatiness  Also he was complaining of left arm pain not necessarily numbness  The patient is currently a smoker  He claims however that there are heart conditions in the family  Also he states that there is history of diabetes in the family  ED Vital Signs:   ED Triage Vitals   Temperature Pulse Respirations Blood Pressure SpO2   10/28/17 2054 10/28/17 2054 10/28/17 2054 10/28/17 2054 10/28/17 2054   98 2 °F (36 8 °C) 88 18 143/73 95 %      Temp Source Heart Rate Source Patient Position - Orthostatic VS BP Location FiO2 (%)   10/28/17 2054 10/28/17 2229 10/28/17 2229 10/28/17 2054 --   Oral Monitor Lying Left arm       Pain Score       10/28/17 2054       7        Wt Readings from Last 1 Encounters:   10/29/17 74 3 kg (163 lb 12 8 oz)       Abnormal Labs/Diagnostic Test Results: WBC 12 01, tl bili 0 18, Glucose 163  EKG --  Sinus rhythm with noted T-wave flattening in the septal leads from V1 to V3 otherwise no ST T wave inversions on the lateral leads and limb leads are unremarkable    ED Treatment:   Medication Administration from 10/28/2017 2045 to 10/29/2017 0054     None          Past Medical/Surgical History:   Past Medical History:   Diagnosis Date    Chronic back pain     Hyperlipidemia        Admitting Diagnosis: Chest pain [R07 9]    Age/Sex: 45 y o  male    Assessment/Plan:   Principal Problem:    Chest pain in adult  Active Problems:    Diaphoresis    Other hyperlipidemia    Chronic low back pain      Plan for the Primary Problem(s):  · Observation, telemetry  · Chest pain ? in adult with complaints of palpitations and possibly diaphoresis  Currently the patient's pain problem is this short 20 minutes duration of palpitations and diaphoresis    With no elevation of troponin and the EKG changes are Herb Brittle confined to ST wave flattening in the septal leads, his CARLA score is essentially less than 2; and therefore it seems outpatient stress testing can be done      Plan for Additional Problem(s):   · Hyperlipidemia  Will continue atorvastatin  · Chronic back pain for which he is disabled  Will continue with the fentanyl patches as ordered and being given as outpatient  I did not order any other narcotic pain medication       Anticipated Length of Stay:  Patient will be admitted on an Observation basis with an anticipated length of stay of  less than 2 midnights     Justification for Hospital Stay: Please see detailed plans noted above        Admission Orders:  M/S/Tele unit  Telem  Serial troponins  Repeat EKG  Cardiac diet  Monitor I&O's  Up & oob as tolerated  venodynes b/l le    Scheduled Meds:   atorvastatin 10 mg Oral Daily   enoxaparin 40 mg Subcutaneous Daily   [START ON 10/31/2017] fentaNYL 1 patch Transdermal Q72H   nicotine 1 patch Transdermal Daily     Continuous Infusions:   sodium chloride 0 9 % with KCl 20 mEq/L 100 mL/hr Last Rate: 100 mL/hr (10/29/17 0137)     PRN Meds:   acetaminophen    aluminum-magnesium hydroxide-simethicone    docusate sodium    influenza vaccine    ondansetron        D/C home 10/29

## 2017-10-29 NOTE — DISCHARGE SUMMARY
Discharge Summary - [facility] Internal Medicine    Patient Information: Mae Elizabeth 45 y o  male MRN: [id number]  Unit/Bed#: [facility] 521-01 Encounter: [id number]    Discharging Physician / Practitioner: Michaelle Ayala DO  PCP: Marielena Sellers DO  Admission Date: 10/28/2017  Discharge Date: 10/29/17    Reason for Admission:  Chest    Discharge Diagnoses:     Principal Problem:    Chest pain in adult  Active Problems:    Other hyperlipidemia    Chronic low back pain    Diaphoresis  Resolved Problems:    * No resolved hospital problems  *      Consultations During Hospital Stay:  · No      Procedures Performed:     · Chest x-ray negative      Hospital Course:     Mae Elizabeth is a 45 y o  male patient who originally presented to the hospital on 10/28/2017 with a a past medical history significant for chronic back pain who presents the hospital 3 days of worsening back discomfort  The patient presents to the hospital yesterday for further workup and evaluation symptoms involving the chest   The patient presents to the hospital for evaluation of chest pain  He denies any further symptoms other than the chest discomfort with associated diaphoresis and intermittent palpitations  He describes a pumping action that concerned him and subsequent brought to the hospital   When he arrived to the emergency room he was noted to have elevated blood pressure 189/106  He presents for further workup evaluation  · Chest pain-continue with telemetry  Serial enzymes  CARLA score less than 2  Outpatient stress  Left voicemail for outpatient stress testing     · Hyperlipidemia-Lipitor  · Chronic back pain-continue with current   Condition at Discharge: fair     Discharge Day Visit / Exam:     * Please refer to separate progress note for these details *    Discussion with Family:  Discussed with patient      Discharge instructions/Information to patient and family:   See after visit summary for information provided to patient and family  Provisions for Follow-Up Care:  See after visit summary for information related to follow-up care and any pertinent home health orders  Disposition:     Home    For Discharges to [facility] SNF:   · Not Applicable to this Patient - Not Applicable to this Patient        Discharge Statement:  I spent thirty-five minutes discharging the patient  This time was spent on the day of discharge  I had direct contact with the patient on the day of discharge  Greater than 50% of the total time was spent examining patient, answering all patient questions, arranging and discussing plan of care with patient as well as directly providing post-discharge instructions  Additional time then spent on discharge activities  Discharge Medications:  See after visit summary for reconciled discharge medications provided to patient and family  ** Please Note: This note has been constructed using a voice recognition system   **

## 2017-10-29 NOTE — NURSING NOTE
Pt alert and oriented x4  Pt denies any complaints of pain  Vital signs stable w/chronic bradycardic HR  Pt  d/c'd home via car w/daughter  Flu shot administered at d/c  AVS reviewed and questions answered

## 2017-10-29 NOTE — H&P
History and Physical - [facility]    Patient Information: Mike Godinez 45 y o  male MRN: [id number]  Unit/Bed#: [facility] 521-01 Encounter: [id number]  Admitting Physician: Kyree Fay MD  PCP: Brenda Jordan DO  Date of Admission:  10/29/17  [facility] Problem List:     Principal Problem:    Chest pain in adult  Active Problems:    Diaphoresis    Other hyperlipidemia    Chronic low back pain      Plan for the Primary Problem(s):  · Observation, telemetry  · Chest pain ? in adult with complaints of palpitations and possibly diaphoresis  Currently the patient's pain problem is this short 20 minutes duration of palpitations and diaphoresis  With no elevation of troponin and the EKG changes are Gay Bloodgood confined to ST wave flattening in the septal leads, his CARLA score is essentially less than 2; and therefore it seems outpatient stress testing can be done  Plan for Additional Problem(s):   · Hyperlipidemia  Will continue atorvastatin  · Chronic back pain for which he is disabled  Will continue with the fentanyl patches as ordered and being given as outpatient  I did not order any other narcotic pain medication  VTE Prophylaxis: Enoxaparin (Lovenox)  / sequential compression device   Code Status: Level 1 - Full Code   POLST: There is no POLST form on file for this patient (pre-hospital)    Anticipated Length of Stay:  Patient will be admitted on an Observation basis with an anticipated length of stay of  less than 2 midnights  Justification for Hospital Stay: Please see detailed plans noted above  Chief Complaint:     Heart racing with diaphoresis  of Present Illness:  Mike Godinez is a 45 y o  male who has a past medical history significant for chronic back pain for which he receives fentanyl patches once every 3 days    (It seems this is fentanyl 100 and a 50 patch applied every 3 days however in medication reconciliation is only 100 patch every 3 days )  In any case, the patient comes in because of complaints of palpitations and diaphoresis  Essentially, the patient was not doing anything at the time of appearance which is 7 o'clock  He said that it lasted for 20 minutes  He describes the palpitations more as a pumping action and that made him concerned  They blood pressure was taken by the sister who was with him and reportedly it was high  He could not recall the exact numbers however in the notes of emergency room physician was 189/106  He says that there was increased sweatiness  Also he was complaining of left arm pain not necessarily numbness  The patient is currently a smoker  He also denies any history of heart attacks in the past   He claims however that there are heart conditions in the family  Also he states that there is history of diabetes in the family  He does not have diabetes however  Currently, patient is looks worried but otherwise is stable  In fact blood pressure is not elevated at this point with a pulse rate in the 80s  The patient does not have any diaphoresis and he does not have any shortness of breath on conversation  Review of Systems:    Constitutional:  Denies fever or chills   Eyes:  Denies change in visual acuity   HENT:  Denies nasal congestion or sore throat   Respiratory:  Denies cough or shortness of breath   Cardiovascular:  Chest discomfort that is described more as heart pounding and pumping  There is no diaphoresis  There is no increased sweatiness    GI:  Denies abdominal pain, nausea, vomiting, bloody stools or diarrhea   :  Denies dysuria   Musculoskeletal:  Denies back pain or joint pain   Integument:  Denies rash   Neurologic:  Denies headache, focal weakness or sensory changes   Endocrine:  Denies polyuria or polydipsia   Lymphatic:  Denies swollen glands   Psychiatric:  Denies depression or anxiety     Past Medical and Surgical History:   Past Medical History:   Diagnosis Date    Chronic back pain     Hyperlipidemia      Past Surgical History:   Procedure Laterality Date    BACK SURGERY         Meds/Allergies:  Prescriptions Prior to Admission   Medication    atorvastatin (LIPITOR) 10 mg tablet    fentaNYL (DURAGESIC) 100 mcg/hr TD 72 hr patch       Allergies: No Known Allergies  History:  Marital Status: /Civil Union   Occupation:  Currently disabled due to back pain  Patient Pre-hospital Living Situation:  Lives at home  Patient Pre-hospital Level of Mobility:  Ambulatory  Patient Pre-hospital Diet Restrictions:  Regular diet  Substance Use History:   History   Alcohol Use No     History   Smoking Status    Current Every Day Smoker    Packs/day: 0 25    Types: Cigarettes   Smokeless Tobacco    Never Used     Comment: declined     History   Drug Use No       Family History:  History reviewed  No pertinent family history  Physical Exam:     Vitals:   Blood Pressure: 99/61 (10/29/17 0013)  Pulse: 74 (10/29/17 0013)  Temperature: 98 2 °F (36 8 °C) (10/28/17 2054)  Temp Source: Oral (10/28/17 2054)  Respirations: 16 (10/29/17 0013)  Height: 5' 8" (172 7 cm) (10/28/17 2054)  Weight - Scale: 75 8 kg (167 lb) (10/28/17 2054)  SpO2: 95 % (10/29/17 0013)    Constitutional:  Well developed, well nourished, no acute distress, non-toxic appearance but apparently worried  Eyes:  PERRL, conjunctiva normal   HENT:  Atraumatic, external ears normal, nose normal, oropharynx moist, no pharyngeal exudates  Neck- normal range of motion, no tenderness, supple   Respiratory:  No respiratory distress, normal breath sounds, no rales, no wheezing   Cardiovascular:  Normal rate, normal rhythm, no murmurs, no gallops, no rubs   GI:  Soft, nondistended, normal bowel sounds, nontender, no organomegaly, no mass, no rebound, no guarding   :  No costovertebral angle tenderness   Musculoskeletal:  No edema, no tenderness, no deformities   Back- no tenderness  Integument:  Well hydrated, no rash   Lymphatic:  No lymphadenopathy noted   Neurologic:  Alert &awake, communicative, CN 2-12 normal, normal motor function, normal sensory function, no focal deficits noted   Psychiatric:  Speech and behavior appropriate       Lab Results: I have personally reviewed pertinent reports  Results from last 7 days  Lab Units 10/28/17  2107   WBC Thousand/uL 12 01*   HEMOGLOBIN g/dL 15 3   HEMATOCRIT % 42 9   PLATELETS Thousands/uL 165   NEUTROS PCT % 59   LYMPHS PCT % 25   MONOS PCT % 4   EOS PCT % 12*       Results from last 7 days  Lab Units 10/28/17  2107   SODIUM mmol/L 138   POTASSIUM mmol/L 3 5   CHLORIDE mmol/L 103   CO2 mmol/L 28   BUN mg/dL 20   CREATININE mg/dL 1 21   CALCIUM mg/dL 8 5   TOTAL PROTEIN g/dL 6 8   BILIRUBIN TOTAL mg/dL 0 18*   ALK PHOS U/L 59   ALT U/L 22   AST U/L 15   GLUCOSE RANDOM mg/dL 163*           EKG:  Sinus rhythm with noted T-wave flattening in the septal leads from V1 to V3 otherwise no ST T wave inversions on the lateral leads and limb leads are unremarkable    Imaging: I have personally reviewed pertinent reports  No results found  Chest x-ray is personally reviewed and was clear of any infiltrates  ** Please Note: Dragon 360 Dictation voice to text software was used in the creation of this document   **

## 2017-10-30 LAB
ATRIAL RATE: 49 BPM
ATRIAL RATE: 58 BPM
ATRIAL RATE: 81 BPM
ATRIAL RATE: 91 BPM
P AXIS: 62 DEGREES
P AXIS: 64 DEGREES
P AXIS: 68 DEGREES
P AXIS: 69 DEGREES
PR INTERVAL: 134 MS
PR INTERVAL: 136 MS
PR INTERVAL: 138 MS
PR INTERVAL: 160 MS
QRS AXIS: 69 DEGREES
QRS AXIS: 76 DEGREES
QRS AXIS: 76 DEGREES
QRS AXIS: 83 DEGREES
QRSD INTERVAL: 100 MS
QRSD INTERVAL: 102 MS
QRSD INTERVAL: 102 MS
QRSD INTERVAL: 114 MS
QT INTERVAL: 376 MS
QT INTERVAL: 386 MS
QT INTERVAL: 470 MS
QT INTERVAL: 482 MS
QTC INTERVAL: 435 MS
QTC INTERVAL: 436 MS
QTC INTERVAL: 461 MS
QTC INTERVAL: 474 MS
T WAVE AXIS: 45 DEGREES
T WAVE AXIS: 51 DEGREES
T WAVE AXIS: 62 DEGREES
T WAVE AXIS: 64 DEGREES
VENTRICULAR RATE: 49 BPM
VENTRICULAR RATE: 58 BPM
VENTRICULAR RATE: 81 BPM
VENTRICULAR RATE: 91 BPM

## 2017-11-03 ENCOUNTER — ALLSCRIPTS OFFICE VISIT (OUTPATIENT)
Dept: OTHER | Facility: OTHER | Age: 52
End: 2017-11-03

## 2017-11-03 DIAGNOSIS — D17.9 BENIGN LIPOMATOUS NEOPLASM: ICD-10-CM

## 2017-11-03 DIAGNOSIS — R07.9 CHEST PAIN: ICD-10-CM

## 2017-11-03 DIAGNOSIS — R00.2 PALPITATIONS: ICD-10-CM

## 2017-11-06 NOTE — PROGRESS NOTES
Assessment  1  Palpitations (785 1) (R00 2)   2  Chest pain (786 50) (R07 9)   3  Depression (311) (F32 9)   4  Low back pain (724 2) (M54 5)    Plan  Chest pain, Palpitations    · STRESS TEST ONLY, EXERCISE; Status:Active; Requested WQO:[id number];   Depression    · 1 - Fahad Dooley (Licensed Social Worker) Co-Management  *  Status: Active   Requested for: [id number]  Care Summary provided  : Yes  Low back pain    · *1 -  [facility] Co-[facility]  *  Status: Active  Requested for:  [id number]  Care Summary provided  : Yes  Palpitations    · HOLTER MONITOR - 48 HOUR; Status:Active; Requested for:03Nov2017;     Discussion/Summary    STRESS TEST AND HOLTER MONITOR TO Tim Granger FOR DEPRESSIONTO PAIN MANAGEMENT FOR OPTIONS FOR PAIN MEDS, WEAN OF FENTANYL PATCH AS ORIGINALLY Janak Diaz 15 ANY [facility] MEDS  Possible side effects of new medications were reviewed with the patient/guardian today  The treatment plan was reviewed with the patient/guardian  The patient/guardian understands and agrees with the treatment plan      Chief Complaint  PT VISIT DUE TO ER VISIT TO [facility] DUE TO PALPITATION AND S O B  ON FRIDAY  WAS ASKED TO VISIT PCP TO HAVE STRESS TEST ORDERED  ALSO WANT TO REVIEW MEDICATION FOR SLEEPING PILLS        History of Present Illness  HERE FOR HOSPITAL FOLLOW UP FOR RAPID HEART BEATSITTING WHILE PLAYING VIDEO GAME, FAST HEART BEAT, TROUBLE BREATHING DUE TO IT, FELT FAST HEART BEAT, BP WAS ELEVATED, CALLED 911TO HAVE HTNFOR OVERNIGHT STAY, ENZYMES OKHAVE OUTPATIENT STRESS TEST, PT STATES HE CAN WALK ON TREADMILL WOULD LIKE TO GET OFF FENTANYL PATCH AND IS INTERESTED IN OTHER OPTIONS FOR PAIN MEDS, AFTER REVIEWING HIS CHART, HE HAS BEEN ON THE PATCH SINCE AT LEAST 2010, MAY BE LONGERFEELS IT DOES NOT GIVE HIM MUCH RELIEF AND PROHIBITS HIM FROM SOME OTHER ACTIVITIES IN LIFE       Review of Systems    Constitutional: No fever or chills, feels well, no tiredness, no recent weight gain or weight loss  Cardiovascular: as noted in HPI  Respiratory: No complaints of shortness of breath, no wheezing, no cough, no SOB on exertion, no orthopnea or PND  Gastrointestinal: No complaints of abdominal pain, no constipation, no nausea or vomiting, no diarrhea or bloody stools  Genitourinary: No complaints of dysuria, no incontinence, no hesitancy, no nocturia, no genital lesion, no testicular pain  Musculoskeletal: as noted in HPI  Integumentary: No complaints of skin rash or skin lesions, no itching, no skin wound, no dry skin  Neurological: No compliants of headache, no confusion, no convulsions, no numbness or tingling, no dizziness or fainting, no limb weakness, no difficulty walking  Psychiatric: Is not suicidal, no sleep disturbances, no anxiety or depression, no change in personality, no emotional problems  Active Problems  1  Abdominal aortic atherosclerosis (440 0) (I70 0)   2  Chronic constipation (564 00) (K59 09)   3  Chronic LUQ pain (789 02,338 29) (R10 12,G89 29)   4  Gynecomastia (611 1) (N62)   5  Hypercholesterolemia (272 0) (E78 00)   6  Insomnia (780 52) (G47 00)   7  Low back pain (724 2) (M54 5)   8  Lower Back Pain Radiating   9  Rhus dermatitis (692 6) (L23 7)   10  Subacromial bursitis of right shoulder joint (726 19) (M75 51)    Past Medical History  1  History of Acute maxillary sinusitis, recurrence not specified (461 0) (J01 00)   2  History of Acute maxillary sinusitis, recurrence not specified (461 0) (J01 00)   3  History of Cervical muscle strain (847 0) (S16 1XXA)   4  History of Colon cancer screening (V76 51) (Z12 11)   5  History of Dysuria (788 1) (R30 0)   6  History of Elbow pain, left (719 42) (M25 522)   7  History of Fracture Of The Vertebral Column (805 8)   8  History of Glaucoma screening (V80 1) (Z13 5)   9  History of acute sinusitis (V12 69) (Z87 09)   10  History of depression (V11 8) (Z86 59)   11   History of influenza vaccination (V49 89) (Z92 29)   12  History of mastitis (V13 89) (Z87 898)   13  History of sore throat (V12 60) (Z87 09)   14  History of Lateral epicondylitis of left elbow (726 32) (M77 12)   15  History of Painful lumpy left breast (611 71,611 72) (N64 4,N63 20)   16  History of Painful lumpy left breast (681 50,956 03) (N64 4,N63 20)   17  History of Prostate cancer screening (V76 44) (Z12 5)    The active problems and past medical history were reviewed and updated today  Surgical History  1  History of Back Surgery   2  History of Lower Back Surgery    The surgical history was reviewed and updated today  Family History  Mother    1  Family history of Diabetes Mellitus (V18 0)   2  Family history of Mother  At Age ___  Father    1  Family history of Father  At Age ___   3  Family history of Stroke Syndrome (V17 1)    The family history was reviewed and updated today  Social History   · Denied: History of Alcohol Use (History)   · Denied: History of Drug Use   · Former smoker (Q06 32) (O53 128)  The social history was reviewed and updated today  The social history was reviewed and is unchanged  Current Meds   1  Atorvastatin Calcium 10 MG Oral Tablet; Take 1 tablet daily; Therapy: [id number] to (Evaluate:2018)  Requested for: 2017; Last   Rx:2017 Ordered   2  FentaNYL 100 MCG/HR Transdermal Patch 72 Hour; APPLY 1 PATCH EVERY 3 DAYS; Therapy: 14Quz1927 to (Bonnie Mini)  Requested for: 12GUU6776; Last   Rx:[id number] Ordered   3  FentaNYL 50 MCG/HR Transdermal Patch 72 Hour; APPLY 1 PATCH EVERY 3 DAYS; Therapy: 70Ezh9514 to (Bonnie Mini)  Requested for: 74EAC7500; Last   Rx:[id number] Ordered    The medication list was reviewed and updated today  Allergies  1  No Known Drug Allergies  2  No Known Environmental Allergies   3   No Known Food Allergies    Vitals  Vital Signs    Recorded: [id number] 02:55PM   Temperature 98 3 F, Tympanic   Heart Rate 76, L Radial   Pulse Quality Regular, L Radial   Respiration 12   Systolic 212, LUE, Sitting   Diastolic 76, LUE, Sitting   Weight 160 lb 4 oz   BMI Calculated 24 6   BSA Calculated 1 85     Physical Exam    Constitutional   General appearance: No acute distress, well appearing and well nourished  Eyes   Conjunctiva and lids: No swelling, erythema, or discharge  Ears, Nose, Mouth, and Throat   External inspection of ears and nose: Normal     Otoscopic examination: Tympanic membrance translucent with normal light reflex  Canals patent without erythema  Nasal mucosa, septum, and turbinates: Normal without edema or erythema  Oropharynx: Normal with no erythema, edema, exudate or lesions  Pulmonary   Respiratory effort: No increased work of breathing or signs of respiratory distress  Auscultation of lungs: Clear to auscultation, equal breath sounds bilaterally, no wheezes, no rales, no rhonci  Cardiovascular   Auscultation of heart: Normal rate and rhythm, normal S1 and S2, without murmurs  Abdomen   Abdomen: Non-tender, no masses  Liver and spleen: No hepatomegaly or splenomegaly  Lymphatic   Palpation of lymph nodes in neck: No lymphadenopathy  Musculoskeletal   Gait and station: Normal     Skin   Skin and subcutaneous tissue: Normal without rashes or lesions  Psychiatric   Orientation to person, place and time: Normal     Mood and affect: Normal          Results/Data  PHQ-9 Adult Depression Screening [id number] 03:45PM User, [facility]     Test Name Result Flag Reference   PHQ-9 Adult Depression Score 22     Over the last two weeks, how often have you been bothered by any of the following problems?   Little interest or pleasure in doing things: Nearly every day - 3  Feeling down, depressed, or hopeless: Nearly every day - 3  Trouble falling or staying asleep, or sleeping too much: Nearly every day - 3  Feeling tired or having little energy: Nearly every day - 3  Poor appetite or over eating: Nearly every day - 3  Feeling bad about yourself - or that you are a failure or have let yourself or your family down: Nearly every day - 3  Trouble concentrating on things, such as reading the newspaper or watching television: Nearly every day - 3  Moving or speaking so slowly that other people could have noticed   Or the opposite -  being so fidgety or restless that you have been moving around a lot more than usual: Several days - 1  Thoughts that you would be better off dead, or of hurting yourself in some way: Not at all - 0   PHQ-9 Adult Depression Screening Positive     PHQ-9 Difficulty Level Not difficult at all     PHQ-9 Severity Severe Depression         Future Appointments    Date/Time Provider Specialty Site   12/04/2017 02:15 PM Iván Dubon  Internal [facility]   Electronically signed by : Iván Wharton; Nov 5 2017  8:34AM EST                       (Author)    Electronically signed by : Bella Kapoor MD; Nov 5 2017  7:35PM EST                       (Author)

## 2017-11-15 ENCOUNTER — HOSPITAL ENCOUNTER (OUTPATIENT)
Dept: NON INVASIVE DIAGNOSTICS | Facility: CLINIC | Age: 52
Discharge: HOME | End: 2017-11-15
Payer: MEDICARE

## 2017-11-15 DIAGNOSIS — R00.2 PALPITATIONS: ICD-10-CM

## 2017-11-15 DIAGNOSIS — R07.9 CHEST PAIN: ICD-10-CM

## 2017-11-15 PROCEDURE — 93225 XTRNL ECG REC<48 HRS REC: CPT

## 2017-11-15 PROCEDURE — 93226 XTRNL ECG REC<48 HR SCAN A/R: CPT

## 2017-11-15 PROCEDURE — 93017 CV STRESS TEST TRACING ONLY: CPT

## 2017-11-16 ENCOUNTER — GENERIC CONVERSION - ENCOUNTER (OUTPATIENT)
Dept: OTHER | Facility: OTHER | Age: 52
End: 2017-11-16

## 2017-11-17 ENCOUNTER — GENERIC CONVERSION - ENCOUNTER (OUTPATIENT)
Dept: OTHER | Facility: OTHER | Age: 52
End: 2017-11-17

## 2017-11-28 ENCOUNTER — ALLSCRIPTS OFFICE VISIT (OUTPATIENT)
Dept: OTHER | Facility: OTHER | Age: 52
End: 2017-11-28

## 2017-11-28 ENCOUNTER — GENERIC CONVERSION - ENCOUNTER (OUTPATIENT)
Dept: OTHER | Facility: OTHER | Age: 52
End: 2017-11-28

## 2017-11-30 ENCOUNTER — GENERIC CONVERSION - ENCOUNTER (OUTPATIENT)
Dept: OTHER | Facility: OTHER | Age: 52
End: 2017-11-30

## 2017-11-30 ENCOUNTER — HOSPITAL ENCOUNTER (OUTPATIENT)
Dept: RADIOLOGY | Facility: HOSPITAL | Age: 52
Discharge: HOME | End: 2017-11-30
Payer: MEDICARE

## 2017-11-30 DIAGNOSIS — R00.2 PALPITATIONS: ICD-10-CM

## 2017-11-30 DIAGNOSIS — D17.9 BENIGN LIPOMATOUS NEOPLASM: ICD-10-CM

## 2017-11-30 PROCEDURE — 76705 ECHO EXAM OF ABDOMEN: CPT

## 2017-12-04 ENCOUNTER — ALLSCRIPTS OFFICE VISIT (OUTPATIENT)
Dept: OTHER | Facility: OTHER | Age: 52
End: 2017-12-04

## 2017-12-05 ENCOUNTER — ALLSCRIPTS OFFICE VISIT (OUTPATIENT)
Dept: OTHER | Facility: OTHER | Age: 52
End: 2017-12-05

## 2017-12-05 DIAGNOSIS — M54.16 RADICULOPATHY OF LUMBAR REGION: ICD-10-CM

## 2017-12-06 NOTE — PROGRESS NOTES
Assessment    1  Palpitations (785 1) (R00 2)   2  Low back pain radiating to both legs (724 2) (M54 5)   3  Depression (311) (F32 9)    Discussion/Summary    CONT CURRENT MEDS  CONT F/U WITH PAIN MANAGEMENTF/U WITH DR MOCK NEXT MONTHWITH ANY PROBLEMS  Possible side effects of new medications were reviewed with the patient/guardian today  The treatment plan was reviewed with the patient/guardian  The patient/guardian understands and agrees with the treatment plan      Chief Complaint  FOLLOW UP TO PALPITATIONS AND PAIN      History of Present Illness  HERE FOR F/U TO TESTING FOR PALPITATIONSWELL, STARTED ON CYMBALTA, HAVING MILD HA AND EYE TWITCHING BUT ONLY ON MEDS FOR 4 DAYSSEE PAIN MANAGEMENT TOMORROWFEELING WELL OTHERWISE      Active Problems  1  Abdominal aortic atherosclerosis (440 0) (I70 0)   2  Chest pain (786 50) (R07 9)   3  Chronic constipation (564 00) (K59 09)   4  Chronic LUQ pain (789 02,338 29) (R10 12,G89 29)   5  Depression (311) (F32 9)   6  Gynecomastia (611 1) (N62)   7  Hypercholesterolemia (272 0) (E78 00)   8  Insomnia (780 52) (G47 00)   9  Lipoma (214 9) (D17 9)   10  Low back pain (724 2) (M54 5)   11  Low back pain radiating to both legs (724 2) (M54 5)   12  Palpitations (785 1) (R00 2)   13  Rhus dermatitis (692 6) (L23 7)   14  Subacromial bursitis of right shoulder joint (726 19) (M75 51)    Past Medical History  1  History of Acute maxillary sinusitis, recurrence not specified (461 0) (J01 00)   2  History of Acute maxillary sinusitis, recurrence not specified (461 0) (J01 00)   3  History of Cervical muscle strain (847 0) (S16 1XXA)   4  History of Colon cancer screening (V76 51) (Z12 11)   5  History of Dysuria (788 1) (R30 0)   6  History of Elbow pain, left (719 42) (M25 522)   7  History of Fracture Of The Vertebral Column (805 8)   8  History of Glaucoma screening (V80 1) (Z13 5)   9  History of acute sinusitis (V12 69) (Z87 09)   10   History of depression (V11 8) (Z86 59)   11  History of influenza vaccination (V49 89) (Z92 29)   12  History of mastitis (V13 89) (Z87 898)   13  History of sore throat (V12 60) (Z87 09)   14  History of Lateral epicondylitis of left elbow (726 32) (M77 12)   15  History of Painful lumpy left breast (611 71,611 72) (N64 4,N63 20)   16  History of Painful lumpy left breast (420 57,535 21) (N64 4,N63 20)   17  History of Prostate cancer screening (V76 44) (Z12 5)    The active problems and past medical history were reviewed and updated today  Surgical History  1  History of Back Surgery   2  History of Lower Back Surgery    The surgical history was reviewed and updated today  Family History  Mother    1  Family history of Diabetes Mellitus (V18 0)   2  Family history of Mother  At Age ___  Father    1  Family history of Father  At Age ___   3  Family history of Stroke Syndrome (V17 1)    The family history was reviewed and updated today  Social History     · Denied: History of Alcohol Use (History)   · Denied: History of Drug Use   · Former smoker (G86 06) (O67 140)  The social history was reviewed and updated today  The social history was reviewed and is unchanged  Current Meds   1  Atorvastatin Calcium 10 MG Oral Tablet; Take 1 tablet daily; Therapy: 89WLP3687 to (Evaluate:2018)  Requested for: 2017; Last Rx:2017 Ordered   2  DULoxetine HCl - 30 MG Oral Capsule Delayed Release Particles; Take 2 tab po qd; Therapy: 36AIW7697 to (Evaluate:60Pii6559)  Requested for: 01THH1085; Last Rx:2017 Ordered   3  FentaNYL 100 MCG/HR Transdermal Patch 72 Hour; APPLY 1 PATCH EVERY 3 DAYS; Therapy: 85Fgg6733 to (Evaluate:2017)  Requested for: 04EVB5666; Last Rx:40Aij0318 Ordered   4  FentaNYL 50 MCG/HR Transdermal Patch 72 Hour; APPLY 1 PATCH EVERY 3 DAYS;  Therapy: 85Qwu6399 to (Bradley Cornell)  Requested for: 52SPW3089; Last Rx:06Vhl6720 Ordered    The medication list was reviewed and updated today  Allergies  1  No Known Drug Allergies  2  No Known Environmental Allergies   3  No Known Food Allergies    Vitals  Vital Signs    Recorded: 38FFM8802 02:21PM   Temperature 97 6 F, Tympanic   Heart Rate 72, R Radial   Pulse Quality Normal, R Radial   Respiration 16   Systolic 852, RUE, Sitting   Diastolic 76, RUE, Sitting   Height 5 ft 8 03 in   Weight 168 lb 12 8 oz   BMI Calculated 25 64   BSA Calculated 1 9       Physical Exam   Constitutional  General appearance: No acute distress, well appearing and well nourished  Eyes  Conjunctiva and lids: No swelling, erythema, or discharge  Pulmonary  Respiratory effort: No increased work of breathing or signs of respiratory distress  Auscultation of lungs: Clear to auscultation, equal breath sounds bilaterally, no wheezes, no rales, no rhonci  Cardiovascular  Auscultation of heart: Normal rate and rhythm, normal S1 and S2, without murmurs  Lymphatic  Palpation of lymph nodes in neck: No lymphadenopathy  Musculoskeletal  Gait and station: Normal    Skin  Skin and subcutaneous tissue: Normal without rashes or lesions  Psychiatric  Orientation to person, place and time: Normal    Mood and affect: Normal          Results/Data  STRESS TEST ONLY, EXERCISE [id number] 09:30AM Tjmatt Ezequiel Order Number: [id number]   - Patient Instructions: To schedule this appointment, please contact Central Scheduling at [phone]       Test Name Result Flag Reference   STRESS TEST ONLY, EXERCISE (Report)       [address]  [phone]   Stress Electrocardiography during Exercise   Name: Cira Duong  MR #: [id number]  Account #: [de-identified]  Study date: 11/15/2017  : 1965  Age: 47 years  Gender: Male  Height: 68 in  Weight: 163 lb  BSA: 1 88 m squared   Allergies: NO KNOWN ALLERGIES   Diagnosis: R07 9 - Chest pain, unspecified   Primary Physician: Merlyn Greene DO Referring Physician: Villa CRNP  Interpreting Physician: Amado Lauren MD  Group: [facility]  Report Prepared By[de-identified] Tanya Jeans  Technician: Tanya Jeans   CLINICAL QUESTION: Detection of coronary artery disease  HISTORY: The patient is a 47year old  male  Chest pain status: recent onset chest pain  Other symptoms: palpitations  Coronary artery disease risk factors: dyslipidemia, smoking, and family history of premature coronary artery  disease  Cardiovascular history: none significant  REST ECG: Normal baseline ECG  PROCEDURE: Treadmill exercise testing was performed, using the Mago protocol  Stress electrocardiographic evaluation was performed  MAGO PROTOCOL:  HR bpm SBP mmHg DBP mmHg Symptoms  Baseline 56 110 70 none  Stage 1 76 132 82 none  Stage 2 95 148 80 none  Stage 3 103 160 90 none  Stage 4 130 160 90 none  Stage 5 144 -- -- fatigue  Recovery 1 76 120 80 none  pre 02 sat 98% peak 02 sat 98% No medications or fluids given  STRESS SUMMARY: Duration of exercise was 12 min and 22 sec  The patient exercised to protocol stage 5  Maximal work rate was 14 7 METs  Maximal heart rate during stress was 148 bpm ( 89 % of maximal predicted heart rate)  There was normal  resting blood pressure with an appropriate response to stress  The rate-pressure product for the peak heart rate and blood pressure was 57541  There was no chest pain during stress  The stress test was terminated due to achievement of  target heart rate and fatigue  The stress ECG was negative for ischemia  There were no stress arrhythmias or conduction abnormalities  SUMMARY:  - Stress results: Duration of exercise was 12 min and 22 sec  Target heart rate was achieved  There was no chest pain during stress  - ECG conclusions: The stress ECG was negative for ischemia  - Summary: over 12 min;neg for ischemia;no chest pain   IMPRESSIONS: Normal study     Prepared and signed by   Amado Lauren , MD  Signed 11/15/2017 11:42:47     HOLTER MONITOR - 48 HOUR [id number] 09:30AM Arlene Escoto Order Number: [id number]   - Patient Instructions: To schedule this appointment, please contact Central Scheduling at [phone]  Test Name Result Flag Reference   HOLTER MONITOR - 50 HOUR (Report)       PATIENT NAME: German Rolon    AGE: 47 y o  Sex: male  MRN: [id number]    PROCEDURE: Holter monitor - 48 hour     INDICATIONS: Palpitations   HOLTER FINDINGS:   The patient was monitored for 48 continuous hours  This revealed the patient to be in sinus rhythm with an average rate of 58 BPM; a minimum rate of 41 BPM; and a maximum rate of 107 BPM     There was no ventricular ectopy o arrhythmias during the study  There were a total of 14 premature atrial contractions  There were 2 atrial couplets  No supraventricular runs or arrhythmias  There was no evidence of atrial fibrillation or atrial   flutter  There was approximately 32 hours of bradycardia  There was no evidence of heart block, and no significant pauses were seen  There was no significant tachycardic episodes  1  The patient was in Sinus rhythm throughout the duration of the study  2  The average heart rate was 58 bpm    3  Rare ectopy without any arrhythmias  4  No symptoms reported  Future Appointments    Date/Time Provider Specialty Site   01/11/2018 03:15 PM [facility]   12/05/2017 07:30 AM Mer Farmer DO Pain Management [facility]       Signatures   Electronically signed by : Iván Mcnamara;  Dec  4 2017  2:44PM EST                       (Author)    Electronically signed by : Hudson Verdin DO; Dec  4 2017  4:04PM EST                       (Author)

## 2017-12-06 NOTE — CONSULTS
Assessment  Assessed    1  Lumbar radiculopathy (724 4) (M54 16)   2  Lumbar post-laminectomy syndrome (722 83) (M96 1)   3  Lumbar spondylosis (721 3) (M47 816)   4  Lumbar degenerative disc disease (722 52) (M51 36)   5  Myofascial pain (729 1) (M79 1)   6  Sacroiliitis (720 2) (M46 1)    Plan  Lumbar radiculopathy    · Naproxen 500 MG Oral Tablet; TAKE 1 TABLET EVERY 12 HOURS AS NEEDED   Rx By: Tawanda Landers; Dispense: 30 Days ; #:60 Tablet; Refill: 1;Lumbar radiculopathy; ADILSON = N; Verified Transmission to [facility]Y #[id number] Last Updated By: System, SureScripts; 2017 7:36:45 AM   · *1 - [facility]  Consult: Evaluate and treatplease evaluate if the patient is a candidate for Noah based therapy for lumbarradiculopathy  Status: Active  Requested for: [id number]   Ordered;Lumbar radiculopathy; Ordered By: Tawanda Landers Performed:  Due: 17ZHD0525  Care Summary provided  : Yes   · * [address]INE [address]; Status:Hold For - Scheduling; Requestedfor:[id number];    Perform:[facility] Radiology; [id number]; Last Updated By:Andreia Porras; 2017 9:44:22 AM;Ordered;radiculopathy; Ordered By:Andi [address] [place] [address];   14-year-old male with a history of previous L5-S1 fusion presenting for initial consultation regarding lumbosacral back pain with radiculopathy in the S1 distribution of the right lower extremity  The patient states that the surgery did not provide any relief of his back and leg pain and has been dealing with this pain since 1999 when the patient was thrown against tree trunk while at work  The patient has had epidural steroid injections in the remote past, however none recently  The last imaging of his lumbar spine with some 2014 which was a CT scan revealing stable fusion L5-S1 and mild retrolisthesis of L2 on L3, L3 on L4, and L4 on L5  The patient has not done any formal physical therapy in quite some time   The patient has been managed by his primary care physician on high-dose opioid therapy including fentanyl patch 150 micrograms/hour Q 72 hours and despite being on this high dose opioid regimen the patient still does not feel his pain is adequately controlled  He was recently started on duloxetine 30 mg daily and was advised after being on this medication for 2 weeks he could increase to 60 mg daily by his PCP  The patient has only been on this medication for a little over a week and has not yet increased to 60 mg daily  He has not yet felt much relief in his neuropathic symptoms since starting this medication yet  He has never tried gabapentin or Lyrica  He is not currently taking any NSAIDs or muscle relaxants  I did have a very jc conversation with the patient regarding the amount of opioid he is taking , and he is currently taking any quit a lid to 300 mg of morphine daily  And despite being on this high dose opioid therapy his pain is still not well controlled  The patient is likely hyperalgesia from being on such a high dose opioid regimen for so long as he has been on this for years  He was previously managed on high-dose oxycodone therapy prior to being on fentanyl  I discussed with him that it would be in his best interest to wean down on the medication as much as possible to at least 90 mg of morphine equivalent daily as recommended by the [facility] [place] for chronic non cancer pain  The patient is essentially on more than triple this amount at this time  Furthermore, the patient was just recently started on a neuropathic medication such as duloxetine and I advised him that he can take a few weeks before the patient notices any relief from this medication and sometimes titration up to 90 year 120 mg daily as needed to achieve optimal therapeutic effect    Addition of another neuropathic agents such as gabapentin or Lyrica may also be helpful, however I am reluctant to starting another neuropathic agent until we see how he responds to the duloxetine in case this is effective or if he experiences side effects we know what the relief for side effect is coming from  An opioid rotation may be useful such as Nucynta, however the patient is on such a high dose of fentanyl, I feel it would be most reasonable to wean the patient down to fentanyl 100 micrograms/hour Q 72 hours before considering this opioid rotation  Once the patient is down to fentanyl 100 micrograms/hour Q 72 hours a regimen such as Nucynta  mg q 12 hours with Nucynta 50 mg IR q 12 hours p r n  for breakthrough pain may be reasonable  Wilner Bird has a better neuropathic pain relief profile considering its norepinephrine effect  Obviously, the goal would be to continue to wean the Nucynta down as tolerated  I did discuss with the patient that once we have received the results of the MRI of his lumbar spine we could consider epidural steroid injections or spinal cord stimulation as well as non opioid medications to try and achieve adequate pain relief which will hopefully reduce his opioid requirements and possibly eliminate them  The patient did verbalize understanding  I did discuss spinal cord stimulation using diagrams and models and I do feel that this may be the best long-term option at controlling the patient's pain, however we will await the MRI of his lumbar spine before discussing any further interventional therapy  1  I will order an MRI of the patient's lumbar spine 2  I will prescribe Noah based physical therapy for his radicular symptoms 3  Patient may continue with duloxetine 30 mg daily and titrate up to 60 mg daily as ordered by his PCP 4  I will prescribed naproxen 500 mg q 12 hours p r n  5  my recommendations for the patient's opioid management are as outlined above  Hopefully we can maximize non opioid therapies including interventional techniques and non opioid medications in order to help his PCP wean him down or hopefully off of opioids altogether   6  I will follow up the patient in 2 months   Discussion/Summary  The patient has the current Goals: Reduced pain and improved function  The patent has the current Barriers: Lumbar post laminectomy syndrome and high-dose opioid therapy  Patient is able to Self-Care  Possible side effects of new medications were reviewed with the patient/guardian today  The treatment plan was reviewed with the patient/guardian  The patient/guardian understands and agrees with the treatment plan   The patient was counseled regarding diagnostic results,-- instructions for management,-- risk factor reductions,-- prognosis,-- patient and family education,-- impressions,-- risks and benefits of treatment options-- and-- importance of compliance with treatment  total time of encounter was 30 minutes  Self Referrals: No      Chief Complaint  Chief Complaints    1  Back Pain  Low back and right leg pain      History of Present Illness  58-year-old male presenting for initial consultation regarding a chronic long-standing history of lumbosacral back pain that radiates in the posterior aspect of his right lower extremity down to the foot with associated numbness, paresthesias, and subjective weakness  The patient does have a history of L5-S1 fusion in 1999 after work related injury when he was thrown against a tree trunk  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  He denies any left lower extremity symptoms, bladder bowel incontinence, or saddle anesthesia  last imaging of his lumbar spine was a CT scan in 2014 revealing stable L5-S1 fusion and bilateral L5 laminectomies  There is mild retrolisthesis of L2 on L3, L3 on L4, and L4 on L5  There is multilevel degenerative disc disease and spondylosis noted as well  There is no significant central or foraminal stenosis noted at that time  patient has not done any formal physical therapy in quite some time   He was recently started on duloxetine 30 mg and was advised after 2 weeks to increase to 60 mg  the patient is still currently on the 30 mg dose as he just started this a little over a week ago  He is also managed on high-dose opioid therapy including fentanyl patch 150 micrograms/hour Q 72 hours  The patient has been on this medication for a long time and is interested in weaning off of the medication as he does not find this is useful as it once was  The patient was previously taken OxyContin and oxycodone before he was switched to fentanyl  The patient does occasionally get constipated from the opioid medication  He has never tried gabapentin or Lyrica in the past  He has had epidural steroid injections in the past, however these only gave him about a day or 2 of relief  He has not had any injections for many years  patient rates his pain a 10/10 on the pain is constant  The pain does not follow any particular pattern throughout the day  The pain is described as burning, shooting, numbness, sharp, throbbing  The pain is decreased with walking  The pain is increased with lying down and bowel movements  He has not gotten any relief from surgery or physical therapy  He has got moderate relief from exercise and heat and ice application  I have personally reviewed and/or updated the patient's past medical history, past surgical history, family history, social history, allergies, and vital signs today  than as stated above, the patient denies any interval changes in medications, medical condition, mental condition, symptoms, or allergies since the last office visit  Referring physician is  Dr Zeny Chaney  Primary Care physician is  Mag Valencia presents with complaints of constant episodes of right lower back pain, described as sharp, burning and throbbing, radiating to the right buttock, right thigh, right lower leg and right foot  Review of Systems   Constitutional: no fever,-- no recent weight gain-- and-- no recent weight loss  Eyes: double vision-- and-- blurry vision  Cardiovascular: chest pain-- and-- palpitations, but-- no lower extremity edema  Respiratory: shortness of breath, but-- no wheezing  Musculoskeletal: joint stiffness-- and-- decreased range of motion, but-- no difficulty walking,-- no muscle weakness,-- no joint swelling,-- no limb swelling-- and-- no pain in extremity  Neurological: no dizziness,-- no difficulty swallowing,-- no memory loss,-- no loss of consciousness-- and-- no seizures  Gastrointestinal: constipation, but-- no nausea,-- no vomiting-- and-- no diarrhea  Genitourinary: no difficulty initiating urine stream,-- no genital pain-- and-- no frequent urination  Integumentary: no complaints of skin rash  Psychiatric: depression-- and-- due to pain  Endocrine: no excessive thirst,-- no adrenal disease,-- no hypothyroidism-- and-- no hyperthyroidism  Hematologic/Lymphatic: no tendency for easy bruising-- and-- no tendency for easy bleeding  ROS reviewed  Active Problems  Problems    1  Abdominal aortic atherosclerosis (440 0) (I70 0)   2  Chest pain (786 50) (R07 9)   3  Chronic constipation (564 00) (K59 09)   4  Chronic LUQ pain (789 02,338 29) (R10 12,G89 29)   5  Depression (311) (F32 9)   6  Gynecomastia (611 1) (N62)   7  Hypercholesterolemia (272 0) (E78 00)   8  Insomnia (780 52) (G47 00)   9  Lipoma (214 9) (D17 9)   10  Low back pain (724 2) (M54 5)   11  Low back pain radiating to both legs (724 2) (M54 5)   12  Palpitations (785 1) (R00 2)   13  Rhus dermatitis (692 6) (L23 7)   14  Subacromial bursitis of right shoulder joint (726 19) (M75 51)    Past Medical History  Problems    1  History of Acute maxillary sinusitis, recurrence not specified (461 0) (J01 00)   2  History of Acute maxillary sinusitis, recurrence not specified (461 0) (J01 00)   3  History of Cervical muscle strain (847 0) (S16 1XXA)   4  Chest pain (786 50) (R07 9)   5  History of Colon cancer screening (V76 51) (Z12 11)   6   History of Depression with anxiety (300 4) (F41 8)   7  History of Dysuria (788 1) (R30 0)   8  History of Elbow pain, left (719 42) (M25 522)   9  History of Fracture Of The Vertebral Column (805 8)   10  History of Glaucoma screening (V80 1) (Z13 5)   11  History of acute sinusitis (V12 69) (Z87 09)   12  History of depression (V11 8) (Z86 59)   13  History of influenza vaccination (V49 89) (Z92 29)   14  History of mastitis (V13 89) (Z87 898)   15  History of sore throat (V12 60) (Z87 09)   16  Hypercholesterolemia (272 0) (E78 00)   17  History of Lateral epicondylitis of left elbow (726 32) (M77 12)   18  History of Painful lumpy left breast (611 71,611 72) (N64 4,N63 20)   19  History of Painful lumpy left breast (611 71,611 72) (N64 4,N63 20)   20  History of Prostate cancer screening (V76 44) (Z12 5)    Surgical History  Problems    1  History of Back Surgery   2  History of Lower Back Surgery    Family History  Mother    1  Family history of Diabetes Mellitus (V18 0)   2  Family history of Mother  At Age ___  Father    1  Family history of Father  At Age ___   3  Family history of Stroke Syndrome (V17 1)  Unknown    5  Family history of hypertension (V17 49) (Z82 49)    Social History  Problems    · Denied: History of Alcohol Use (History)   · Denied: History of Drug Use   · Former smoker (V15 82) (W67 292)    Current Meds   1  Atorvastatin Calcium 10 MG Oral Tablet; Take 1 tablet daily; Therapy: 34KTZ0150 to (Evaluate:23Njk1692)  Requested for: 76Bzg5739; Last Rx:60Pbi7053 Ordered   2  DULoxetine HCl - 30 MG Oral Capsule Delayed Release Particles; Take 2 tab po qd; Therapy: 54WBL9321 to (Evaluate:72Wno0994)  Requested for: 35NMB8072; Last Rx:84Pvv9899 Ordered   3  FentaNYL 100 MCG/HR Transdermal Patch 72 Hour; APPLY 1 PATCH EVERY 3 DAYS; Therapy: 69Yug6456 to (Evaluate:2017)  Requested for: 17EEZ3568; Last Rx:56Tsi4734 Ordered   4   FentaNYL 50 MCG/HR Transdermal Patch 72 Hour; APPLY 1 PATCH EVERY 3 DAYS; Therapy: 20Apr2012 to (Selena Charles)  Requested for: 28SHV8272; Last Rx:00Afv2277 Ordered    Allergies  Medication    1  No Known Drug Allergies  Non-Medication    2  No Known Environmental Allergies   3  No Known Food Allergies    Vitals  Vital Signs    Recorded: 20XOW5666 07:07AM   Temperature 97 6 F   Heart Rate 72   Systolic 625   Diastolic 85   Height 5 ft 8 03 in   Weight 164 lb 8 oz   BMI Calculated 24 99   BSA Calculated 1 88   Pain Scale 8       Physical Exam   Constitutional  General appearance: Well developed, well nourished, alert, in no distress, non-toxic and no overt pain behavior  Eyes  Sclera: anicteric  HEENT  Hearing grossly intact  Neck  Neck: Supple, symmetric, trachea midline, no masses  Pulmonary  Respiratory effort: Even and unlabored  Abdomen  Abdomen: Soft, non-tender, non-distended  Skin  Skin and subcutaneous tissue: Abnormal  -- Well-healed vertical midline incision  Psychiatric  Mood and affect: Mood and affect appropriate  Neurologic  the muscle tone was normal  Musculoskeletal  Gait and station: Normal    Lumbar/Sacral Spine examination demonstrates Lumbosacral Spine: Flattened lumbar lordosis  Tenderness: right paraspinal,-- left paraspinal,-- right sciatic notch,-- left sciatic notch,-- the right sacroiliac joint-- and-- the left sacroiliac joint  Palpatory Findings include bilateral muscle spasms  Lumbosacral Spine Sensory: intact to light touch and pinprick in the lower extremities  ROM Lumbosacral Spine: Full except as noted: Flexion was restricted-- and-- was painful  Extension was restricted-- and-- was painful  ROM Hips: Full  Foot and ankle strength was normal bilaterally  Knee strength was normal bilaterally  Hip strength was normal bilaterally  Evaluation of Muscle Stretch Reflexes on the right side demonstrates muscle stretch reflexes equal and symmetric right lower limbs-- and-- negative right ankle clonus    Evaluation of Muscle Stretch Reflexes on the left side demonstrates negative left ankle clonus, but-- muscle stretch reflexes equal and symmetric right lower limbs  Special Tests: positive Straight Leg Raise on right,-- equivocal Straight Leg Raise on left,-- positive Juan's Maneuver on right,-- positive Juan's Maneuver on left,-- positive Gaenslen's on the right-- and-- positive Gaenslen's Test on the left  Results/Data  Procedure Flowsheet [id number] 07:21AM      Test Name Result Flag Reference   Oswestry Score 49         Results    I personally reviewed the films/images in the office today        Future Appointments    Date/Time Provider Specialty Site   01/11/2018 03:15 PM Edwar Jalloh [address]   01/30/2018 01:30 PM Jennifer Finley DO Pain Management [facility] SPINE       Signatures   Electronically signed by : Luh Davis DO; Dec  5 2017  5:55PM EST                       (Author)

## 2017-12-13 ENCOUNTER — APPOINTMENT (OUTPATIENT)
Dept: PHYSICAL THERAPY | Facility: CLINIC | Age: 52
End: 2017-12-13
Payer: MEDICARE

## 2017-12-13 PROCEDURE — G8979 MOBILITY GOAL STATUS: HCPCS

## 2017-12-13 PROCEDURE — 97162 PT EVAL MOD COMPLEX 30 MIN: CPT

## 2017-12-13 PROCEDURE — G8978 MOBILITY CURRENT STATUS: HCPCS

## 2017-12-15 ENCOUNTER — GENERIC CONVERSION - ENCOUNTER (OUTPATIENT)
Dept: PAIN MEDICINE | Facility: CLINIC | Age: 52
End: 2017-12-15

## 2017-12-19 ENCOUNTER — HOSPITAL ENCOUNTER (OUTPATIENT)
Dept: RADIOLOGY | Facility: HOSPITAL | Age: 52
Discharge: HOME | End: 2017-12-19
Attending: ANESTHESIOLOGY
Payer: MEDICARE

## 2017-12-19 DIAGNOSIS — M54.16 RADICULOPATHY OF LUMBAR REGION: ICD-10-CM

## 2017-12-19 PROCEDURE — 72148 MRI LUMBAR SPINE W/O DYE: CPT

## 2017-12-20 ENCOUNTER — APPOINTMENT (OUTPATIENT)
Dept: PHYSICAL THERAPY | Facility: CLINIC | Age: 52
End: 2017-12-20
Payer: MEDICARE

## 2017-12-20 ENCOUNTER — GENERIC CONVERSION - ENCOUNTER (OUTPATIENT)
Dept: OTHER | Facility: OTHER | Age: 52
End: 2017-12-20

## 2017-12-20 PROCEDURE — 97110 THERAPEUTIC EXERCISES: CPT

## 2017-12-27 ENCOUNTER — APPOINTMENT (OUTPATIENT)
Dept: PHYSICAL THERAPY | Facility: CLINIC | Age: 52
End: 2017-12-27
Payer: MEDICARE

## 2017-12-27 PROCEDURE — 97110 THERAPEUTIC EXERCISES: CPT

## 2017-12-27 PROCEDURE — 97112 NEUROMUSCULAR REEDUCATION: CPT

## 2018-01-10 NOTE — PROGRESS NOTES
Assessment    1  Former smoker (V15 82) (P05 866)   2  Encounter for preventive health examination (V70 0) (Z00 00)   3  Hypercholesterolemia (272 0) (E78 0)   4  Prostate cancer screening (V76 44) (Z12 5)   5  Chronic LUQ pain (789 02,338 29) (R10 12,G89 29)   6  Low back pain (724 2) (M54 5)    Plan  Chronic LUQ pain, Health Maintenance, Hypercholesterolemia, Low back pain, Prostate  cancer screening    · (1) COMPREHENSIVE METABOLIC PANEL; Status:Active; Requested WBM:27XTQ1279;    · (1) LIPID PANEL, FASTING; Status:Active; Requested PLO:44FKM9402;    · Follow-up visit in 6 months Evaluation and Treatment  Follow-up  Status: Hold For -  Scheduling  Requested for: 78Krv6243  Health Maintenance    · *1 - [facility] Physician Referral  Consult - 44 yo male in need of a Dentist;  issues with insurance coverage; poor dentition  Thanks  Guera Luna DO  Status:  Active  Requested for: 94LWO6372  Care Summary provided  : Yes    Discussion/Summary    Mahin appears well  He is to continue the Atorvastatin, regular exercise, and a healthy diet  Repeat FBW ordered  He is to schedule his colonoscopy  Pt referred to the [facility]  Impression: Subsequent Annual Wellness Visit, with preventive exam as well as age and risk appropriate counseling completed  Patient Discussion: plan discussed with the patient, follow-up visit needed in 6 months, or sooner PRN  Chief Complaint  Patient presents to office for a Medicare wellness exam       History of Present Illness  HPI: Here today for his annual Wellness Exam    Welcome to [employer] and Wellness Visits: The patient is being seen for the subsequent annual wellness visit  Drug and Alcohol Use: The patient is a former cigarette smoker  The patient reports never drinking alcohol  Diet and Physical Activity: Current diet includes well balanced meals  He exercises daily  Exercise: walking     Mood Disorder and Cognitive Impairment Screening: He denies feeling down, depressed, or hopeless over the past two weeks  He denies feeling little interest or pleasure in doing things over the past two weeks  Functional Ability/Level of Safety: The patient is currently able to do activities of daily living without limitations  Activities of daily living details: does not need help using the phone, no transportation help needed, does not need help shopping, no meal preparation help needed, does not need help doing housework, does not need help doing laundry, does not need help managing medications and does not need help managing money  Advance Directives: Advance directives: no living will  end of life decisions were reviewed with the patient  Co-Managers and Medical Equipment/Suppliers: See Patient Care Team   Additional Information: Lc Hay is feeling well  His back pain is stable - has seen Pain Management  No CP/SOB  No abd pain or rectal bleeding  Urine flow is a little slower, but no nocturia, no ED  No anxiety or depression  Just had EGD which was normal with [facility]; they will be scheduling colonoscopy soon  Immunizations are UTD  Labs from 6 months ago reviewed: PSA 0 5, Gluc 88, Cr and LFTs nml, LDL approx 200 - Atorvastatin was ordered, and he is taking  No myalgias  Reviewed Updated Ruchi Bales:   Last Medicare Wellness Visit Information was reviewed, patient interviewed, no change since last [facility]  Preventive Quality Program 65 and Older: Falls Risk: The patient fell 0 times in the past 12 months  Review of Systems    Constitutional: as noted in HPI  Cardiovascular: as noted in HPI  Respiratory: as noted in HPI  Gastrointestinal: as noted in HPI  Genitourinary: as noted in HPI  Psychiatric: as noted in HPI  Active Problems    1  Abdominal aortic atherosclerosis (440 0) (I70 0)   2  Acute maxillary sinusitis, recurrence not specified (461 0) (J01 00)   3  Cervical muscle strain (847 0) (S16 1XXA)   4   Chronic constipation (564 00) (K59 09)   5  Chronic LUQ pain (789 02,338 29) (R10 12,G89 29)   6  Colon cancer screening (V76 51) (Z12 11)   7  Elbow pain, left (719 42) (M25 522)   8  Glaucoma screening (V80 1) (Z13 5)   9  Hypercholesterolemia (272 0) (E78 0)   10  Insomnia (780 52) (G47 00)   11  Low back pain (724 2) (M54 5)   12  Lower Back Pain Radiating   13  Need for prophylactic vaccination and inoculation against influenza (V04 81) (Z23)   14  Prostate cancer screening (V76 44) (Z12 5)   15  Rhus dermatitis (692 6) (L23 7)   16  Sore throat (462) (J02 9)    Past Medical History    · History of Dysuria (788 1) (R30 0)   · History of Fracture Of The Vertebral Column (805 8)   · History of acute sinusitis (V12 69) (Z87 09)   · History of depression (V11 8) (Z86 59)   · History of Lateral epicondylitis of left elbow (726 32) (M77 12)    The active problems and past medical history were reviewed and updated today  Surgical History    · History of Back Surgery   · History of Lower Back Surgery    The surgical history was reviewed and updated today  Family History  Mother    · Family history of Diabetes Mellitus (V18 0)   · Family history of Mother  At Age ___  Father    · Family history of Father  At Age ___   · Family history of Stroke Syndrome (V17 1)    The family history was reviewed and updated today  Social History    · Denied: History of Alcohol Use (History)   · Denied: History of Drug Use   · Former smoker (C50 42) (P84 207)  The social history was reviewed and updated today  Current Meds   1  Atorvastatin Calcium 10 MG Oral Tablet; Take 1 tablet daily; Therapy: 78SST6228 to (Wynema Part)  Requested for: 44WUL1230; Last   Rx:2016 Ordered   2  FentaNYL 100 MCG/HR Transdermal Patch 72 Hour; APPLY 1 PATCH EVERY 3 DAYS; Therapy: 18Xqc8422 to (Evaluate:86Har6266)  Requested for: 69YQL5366; Last   Rx:61Kvr4232 Ordered   3   FentaNYL 50 MCG/HR Transdermal Patch 72 Hour; APPLY 1 PATCH EVERY 3 DAYS; Therapy: 20Apr2012 to (Florentin Young)  Requested for: 45SQP4988; Last   Rx:62Wwb7282 Ordered    The medication list was reviewed and updated today  Allergies    1  No Known Drug Allergies    2  No Known Environmental Allergies   3  No Known Food Allergies    Immunizations   1 2    Influenza  20-Nov-2012 29-Oct-2015    Tdap  20-Nov-2012      Vitals  Signs    Systolic: 681  Diastolic: 72  Heart Rate: 76  Respiration: 18  Height: 5 ft 7 68 in  Weight: 156 lb 12 8 oz  BMI Calculated: 24 07  BSA Calculated: 1 84    Physical Exam    Constitutional   General appearance: No acute distress, well appearing and well nourished  NAD; VSS; pleasant  Head and Face   Head and face: Normal     Eyes   Conjunctiva and lids: No erythema, swelling or discharge  Ears, Nose, Mouth, and Throat   External inspection of ears and nose: Normal     Otoscopic examination: Tympanic membranes translucent with normal light reflex  Canals patent without erythema  Hearing: Normal     Lips, teeth, and gums: Normal, good dentition  Oropharynx: Normal with no erythema, edema, exudate or lesions  Neck   Neck: Supple, symmetric, trachea midline, no masses  Pulmonary   Respiratory effort: No increased work of breathing or signs of respiratory distress  Auscultation of lungs: Clear to auscultation  Cardiovascular   Auscultation of heart: Normal rate and rhythm, normal S1 and S2, no murmurs  Peripheral vascular exam: Normal     Examination of extremities for edema and/or varicosities: Normal     Abdomen   Abdomen: Non-tender, no masses  Liver and spleen: No hepatomegaly or splenomegaly  Anus, perineum, and rectum: Normal sphincter tone, no masses, no prolapse  No gross blood on examining finger  Genitourinary   Digital rectal exam of prostate: Abnormal   The prostate was enlarged, but had no palpable nodules, was nontender and was not fluctuant  +1 sized; smooth, no nodules     Lymphatic Palpation of lymph nodes in neck: No lymphadenopathy  Musculoskeletal   Gait and station: Normal     Psychiatric   Judgment and insight: Normal     Orientation to person, place and time: Normal     Recent and remote memory: Intact      Mood and affect: Normal        Signatures   Electronically signed by : Yazan Lee DO; Sep  7 2016 11:15AM EST                       (Author)

## 2018-01-11 ENCOUNTER — ALLSCRIPTS OFFICE VISIT (OUTPATIENT)
Dept: OTHER | Facility: OTHER | Age: 53
End: 2018-01-11

## 2018-01-11 NOTE — PSYCH
History of Present Illness  Psychotherapy Provided [facility]: Individual Psychotherapy 30 minutes minutes provided today  Goals addressed in session:   Tj Coleman has become more depressed over last several months  Having increased depressed mood, crying spells, more social isolation  Having issues with decreased energy and motivation  Having issues with sleep  Tearful at times during session  Affecting his relationship with partner  He is verbal, cooperative and well oriented  HPI - Psych: Mahin details aforementioned symptoms/struggles  Has felt more anxious as well  Would like to find some p/t work/activity but feels mood issues hold him back  Has not felt like this previously  Denies any SI  Ha good support system   Note   Note:   Provided supportive therapy  Reviewed coping strategies for depression  Will consult with PCP about treatment options for him and contact patient  Assessment    1   Depression (311) (F32 9)    Signatures   Electronically signed by : Devante Dobbs LCSW; Nov 28 2017  7:06PM EST                       (Author)

## 2018-01-11 NOTE — RESULT NOTES
Verified Results  HOLTER MONITOR - 50 HOUR [id number] 09:30AM Chelsie Pap Order Number: [id number]    - Patient Instructions: To schedule this appointment, please contact Central Scheduling at [phone]  Test Name Result Flag Reference   HOLTER MONITOR - 50 HOUR (Report)     PATIENT NAME: Gerri Rowe      AGE: 47 y o  Sex: male   MRN: [id number]       PROCEDURE: Holter monitor - 48 hour        INDICATIONS: Palpitations     HOLTER FINDINGS:     The patient was monitored for 48 continuous hours  This revealed the patient to be in sinus rhythm with an average rate of 58 BPM; a minimum rate of 41 BPM; and a maximum rate of 107 BPM       There was no ventricular ectopy o arrhythmias during the study  There were a total of 14 premature atrial contractions  There were 2 atrial couplets  No supraventricular runs or arrhythmias  There was no evidence of atrial fibrillation or atrial    flutter  There was approximately 32 hours of bradycardia  There was no evidence of heart block, and no significant pauses were seen  There was no significant tachycardic episodes  1  The patient was in Sinus rhythm throughout the duration of the study  2  The average heart rate was 58 bpm     3  Rare ectopy without any arrhythmias   4  No symptoms reported         Discussion/Summary   HOLTER MONITOR IS OK

## 2018-01-12 NOTE — RESULT NOTES
Verified Results  (1) COMPREHENSIVE METABOLIC PANEL [id number] 98:81CI Pavan Robles Order Number: [id number]     Test Name Result Flag Reference   GLUCOSE,RANDM 83 mg/dL     If the patient is fasting, the ADA then defines impaired fasting glucose as > 100 mg/dL and diabetes as > or equal to 123 mg/dL  SODIUM 141 mmol/L  136-145   POTASSIUM 3 8 mmol/L  3 5-5 3   CHLORIDE 106 mmol/L  100-108   CARBON DIOXIDE 27 mmol/L  21-32   ANION GAP (CALC) 8 mmol/L  4-13   BLOOD UREA NITROGEN 20 mg/dL  5-25   CREATININE 0 91 mg/dL  0 60-1 30   Standardized to IDMS reference method   CALCIUM 8 8 mg/dL  8 3-10 1   BILI, TOTAL 0 49 mg/dL  0 20-1 00   ALK PHOSPHATAS 54 U/L     ALT (SGPT) 29 U/L  12-78   AST(SGOT) 14 U/L  5-45   ALBUMIN 3 7 g/dL  3 5-5 0   TOTAL PROTEIN 7 3 g/dL  6 4-8 2   eGFR Non-African American      >60 0 ml/min/1 73sq m   - Patient Instructions: This is a fasting blood test  Water, black tea or black coffee only after 9:00pm the night before test Drink 2 glasses of water the morning of test   National Kidney Disease Education Program recommendations are as follows:  GFR calculation is accurate only with a steady state creatinine  Chronic Kidney disease less than 60 ml/min/1 73 sq  meters  Kidney failure less than 15 ml/min/1 73 sq  meters  (1) LIPID PANEL FASTING W DIRECT LDL REFLEX 40ZOP6351 07:47AM Pavan Robles Order Number: [id number]     Test Name Result Flag Reference   CHOLESTEROL 162 mg/dL     LDL CHOLESTEROL CALCULATED 104 mg/dL H 0-100   - Patient Instructions: This is a fasting blood test  Water, black tea or black coffee only after 9:00pm the night before test   Drink 2 glasses of water the morning of test     - Patient Instructions:  This is a fasting blood test  Water, black tea or black coffee only after 9:00pm the night before test Drink 2 glasses of water the morning of test   Triglyceride:         Normal              <150 mg/dl Borderline High    150-199 mg/dl       High               200-499 mg/dl       Very High          >499 mg/dl  Cholesterol:         Desirable        <200 mg/dl      Borderline High  200-239 mg/dl      High             >239 mg/dl  HDL Cholesterol:        High    >59 mg/dL      Low     <41 mg/dL  LDL Cholesterol:        Optimal          <100 mg/dl        Near Optimal     100-129 mg/dl        Above Optimal          Borderline High   130-159 mg/dl          High              160-189 mg/dl          Very High        >189 mg/dl  LDL CALCULATED:    This screening LDL is a calculated result  It does not have the accuracy of the Direct Measured LDL in the monitoring of patients with hyperlipidemia and/or statin therapy  Direct Measure LDL (DYS611) must be ordered separately in these patients  TRIGLYCERIDES 78 mg/dL  <=150   Specimen collection should occur prior to N-Acetylcysteine or Metamizole administration due to the potential for falsely depressed results  HDL,DIRECT 42 mg/dL  40-60   Specimen collection should occur prior to Metamizole administration due to the potential for falsely depressed results  (1) TSH WITH FT4 REFLEX [id number] 07:47AM Gennius Order Number: [id number]     Test Name Result Flag Reference   TSH 2 050 uIU/mL  0 358-3 740   - Patient Instructions: This is a fasting blood test  Water, black tea or black coffee only after 9:00pm the night before test Drink 2 glasses of water the morning of test   Patients undergoing fluorescein dye angiography may retain small amounts of fluorescein in the body for 48-72 hours post procedure  Samples containing fluorescein can produce falsely depressed TSH values  If the patient had this procedure,a specimen should be resubmitted post fluorescein clearance       (1) CBC/PLT/DIFF [id number] 07:47AM G[facility] Order Number: [id number]     Test Name Result Flag Reference   WBC COUNT 9 66 Thousand/uL  4 31-10 16   RBC COUNT 5 17 Million/uL  3 88-5 62   HEMOGLOBIN 15 7 g/dL  12 0-17 0   HEMATOCRIT 45 4 %  36 5-49 3   MCV 88 fL  82-98   MCH 30 4 pg  26 8-34 3   MCHC 34 6 g/dL  31 4-37 4   RDW 12 7 %  11 6-15 1   MPV 10 6 fL  8 9-12 7   PLATELET COUNT 212 Thousands/uL  149-390   nRBC AUTOMATED 0 /100 WBCs     NEUTROPHILS RELATIVE PERCENT 42 % L 43-75   LYMPHOCYTES RELATIVE PERCENT 46 % H 14-44   MONOCYTES RELATIVE PERCENT 5 %  4-12   EOSINOPHILS RELATIVE PERCENT 7 % H 0-6   BASOPHILS RELATIVE PERCENT 0 %  0-1   NEUTROPHILS ABSOLUTE COUNT 4 04 Thousands/?L  1 85-7 62   LYMPHOCYTES ABSOLUTE COUNT 4 32 Thousands/?L  0 60-4 47   MONOCYTES ABSOLUTE COUNT 0 52 Thousand/?L  0 17-1 22   EOSINOPHILS ABSOLUTE COUNT 0 70 Thousand/?L H 0 00-0 61   BASOPHILS ABSOLUTE COUNT 0 01 Thousands/?L  0 00-0 10   - Patient Instructions: This bloodwork is non-fasting  Please drink two glasses of water morning of bloodwork  - Patient Instructions: This bloodwork is non-fasting  Please drink two glasses of water morning of bloodwork         Discussion/Summary   CHOLESTEROL IS MUCH BETTER    DR Skye Brower

## 2018-01-13 VITALS
HEART RATE: 64 BPM | BODY MASS INDEX: 25.33 KG/M2 | DIASTOLIC BLOOD PRESSURE: 80 MMHG | RESPIRATION RATE: 16 BRPM | SYSTOLIC BLOOD PRESSURE: 114 MMHG | HEIGHT: 68 IN | WEIGHT: 167.13 LBS

## 2018-01-13 VITALS
HEIGHT: 68 IN | DIASTOLIC BLOOD PRESSURE: 82 MMHG | SYSTOLIC BLOOD PRESSURE: 126 MMHG | WEIGHT: 168.13 LBS | RESPIRATION RATE: 16 BRPM | HEART RATE: 60 BPM | BODY MASS INDEX: 25.48 KG/M2

## 2018-01-13 NOTE — PROGRESS NOTES
Assessment   1  Low back pain radiating to both legs (724 2) (M54 5)   2  Depression (311) (F32 9)    Plan   Depression, Low back pain radiating to both legs    · DULoxetine HCl - 60 MG Oral Capsule Delayed Release Particles; TAKE 1    CAPSULE EVERY DAY   · Follow-up visit in 3 months Evaluation and Treatment  Follow-up  Status: Complete     Done: 31WLQ8519    Discussion/Summary      Joi Liu is doing well right now  The Duloxetine appears to be helping him at this time - no changes to the med  He is to f/u in 3 months, or sooner PRN  The patient was counseled regarding instructions for management,-- impressions,-- importance of compliance with treatment  The treatment plan was reviewed with the patient/guardian  The patient/guardian understands and agrees with the treatment plan      Chief Complaint   PT is being seen today for a 6 week f/u to discuss the new medication he was prescribed  levels overall are better - still seeing Pain Management as well  has been good  No HI/SI  Tolerating the medication well - No GI upset, no headaches  History of Present Illness   As above  Review of Systems        Constitutional: as noted in HPI  Gastrointestinal: as noted in HPI  Musculoskeletal: as noted in HPI  Neurological: as noted in HPI  Psychiatric: as noted in HPI  Active Problems   1  Abdominal aortic atherosclerosis (440 0) (I70 0)   2  Chest pain (786 50) (R07 9)   3  Chronic constipation (564 00) (K59 09)   4  Chronic LUQ pain (789 02,338 29) (R10 12,G89 29)   5  Depression (311) (F32 9)   6  Gynecomastia (611 1) (N62)   7  Hypercholesterolemia (272 0) (E78 00)   8  Insomnia (780 52) (G47 00)   9  Lipoma (214 9) (D17 9)   10  Low back pain (724 2) (M54 5)   11  Low back pain radiating to both legs (724 2) (M54 5)   12  Lumbar degenerative disc disease (722 52) (M51 36)   13  Lumbar post-laminectomy syndrome (722 83) (M96 1)   14  Lumbar radiculopathy (724 4) (M54 16)   15   Lumbar spondylosis (721 3) (M47 816)   16  Myofascial pain (729 1) (M79 1)   17  Palpitations (785 1) (R00 2)   18  Rhus dermatitis (692 6) (L23 7)   19  Sacroiliitis (720 2) (M46 1)   20  Subacromial bursitis of right shoulder joint (726 19) (M75 51)    Past Medical History   1  History of Acute maxillary sinusitis, recurrence not specified (461 0) (J01 00)   2  History of Acute maxillary sinusitis, recurrence not specified (461 0) (J01 00)   3  History of Cervical muscle strain (847 0) (S16 1XXA)   4  Chest pain (786 50) (R07 9)   5  History of Colon cancer screening (V76 51) (Z12 11)   6  History of Depression with anxiety (300 4) (F41 8)   7  History of Dysuria (788 1) (R30 0)   8  History of Elbow pain, left (719 42) (M25 522)   9  History of Fracture Of The Vertebral Column (805 8)   10  History of Glaucoma screening (V80 1) (Z13 5)   11  History of acute sinusitis (V12 69) (Z87 09)   12  History of depression (V11 8) (Z86 59)   13  History of influenza vaccination (V49 89) (Z92 29)   14  History of mastitis (V13 89) (Z87 898)   15  History of sore throat (V12 60) (Z87 09)   16  Hypercholesterolemia (272 0) (E78 00)   17  History of Lateral epicondylitis of left elbow (726 32) (M77 12)   18  History of Painful lumpy left breast (611 71,611 72) (N64 4,N63 20)   19  History of Painful lumpy left breast (611 71,611 72) (N64 4,N63 20)   20  History of Prostate cancer screening (V76 44) (Z12 5)     The active problems and past medical history were reviewed and updated today  Surgical History   1  History of Back Surgery   2  History of Lower Back Surgery    Family History   Mother    1  Family history of Diabetes Mellitus (V18 0)   2  Family history of Mother  At Age ___  Father    1  Family history of Father  At Age ___   3  Family history of Stroke Syndrome (V17 1)  Unknown    5   Family history of hypertension (V17 49) (Z82 49)    Social History    · Denied: History of Alcohol Use (History)   · Denied: History of Drug Use   · Former smoker (L25 05) (U21 367)    Current Meds    1  Atorvastatin Calcium 10 MG Oral Tablet; Take 1 tablet daily; Therapy: 87VZK9152 to (Evaluate:18Oct2018)  Requested for: 23Oct2017; Last     Rx:23Oct2017 Ordered   2  FentaNYL 100 MCG/HR Transdermal Patch 72 Hour; APPLY 1 PATCH EVERY 3 DAYS; Therapy: 18Djo3464 to (Evaluate:18Jan2018)  Requested for: 71EIH2915; Last     Rx:03Jan2018 Ordered   3  FentaNYL 50 MCG/HR Transdermal Patch 72 Hour; APPLY 1 PATCH EVERY 3 DAYS; Therapy: 19Qsh4343 to (Evaluate:19Jan2018)  Requested for: 30XIV0855; Last     Rx:03Jan2018 Ordered   4  Naproxen 500 MG Oral Tablet; TAKE 1 TABLET EVERY 12 HOURS AS NEEDED; Therapy: 09CSR8897 to (Evaluate:31Sir1556)  Requested for: 68EVH5224; Last     Rx:79Ztp0088 Ordered     The medication list was reviewed and updated today  Allergies   1  No Known Drug Allergies  2  No Known Environmental Allergies   3  No Known Food Allergies    Vitals   Vital Signs    Recorded: 61XQZ3255 03:23PM   Heart Rate 60   Respiration 12   Systolic 641   Diastolic 70   Height 5 ft 8 03 in   Weight 169 lb    BMI Calculated 25 67   BSA Calculated 1 9     Physical Exam        Constitutional      General appearance: No acute distress, well appearing and well nourished  -- NAD; VSS; pleasant        Musculoskeletal      Gait and station: Normal        Psychiatric      Orientation to person, place and time: Normal        Mood and affect: Normal           Future Appointments      Date/Time Provider Specialty Site   01/30/2018 01:30 PM Dianne Khan DO Pain Management [address]     Signatures    Electronically signed by : Gloria Myers DO; Jan 12 2018  7:52AM EST                       (Author)

## 2018-01-14 VITALS
SYSTOLIC BLOOD PRESSURE: 118 MMHG | DIASTOLIC BLOOD PRESSURE: 68 MMHG | HEART RATE: 64 BPM | WEIGHT: 169.25 LBS | HEIGHT: 68 IN | BODY MASS INDEX: 25.65 KG/M2 | RESPIRATION RATE: 16 BRPM

## 2018-01-14 VITALS
DIASTOLIC BLOOD PRESSURE: 76 MMHG | HEART RATE: 76 BPM | BODY MASS INDEX: 24.37 KG/M2 | TEMPERATURE: 98.3 F | RESPIRATION RATE: 12 BRPM | SYSTOLIC BLOOD PRESSURE: 124 MMHG | WEIGHT: 160.25 LBS

## 2018-01-14 VITALS
WEIGHT: 164 LBS | HEART RATE: 60 BPM | DIASTOLIC BLOOD PRESSURE: 66 MMHG | BODY MASS INDEX: 24.86 KG/M2 | HEIGHT: 68 IN | SYSTOLIC BLOOD PRESSURE: 114 MMHG | RESPIRATION RATE: 16 BRPM

## 2018-01-14 NOTE — RESULT NOTES
Verified Results  MAMMO DIAGNOSTIC BILATERAL W 3D & CAD 30Jun2017 01:29PM [address], [address]     Test Name Result Flag Reference   MAMMO DIAGNOSTIC BILATERAL W 3D & CAD (Report)     Patient History:   Patient is an every day smoker  Patient's BMI is 24 9  Reason for exam: clinical finding  Mammo Diagnostic Bilateral W DBT and CAD: June 30, 2017 - Check    In #: [de-identified]   2D/3D Procedure   3D views: Bilateral MLO view(s) were taken  2D views: Bilateral CC view(s) were taken  Technologist: Iram Parkinson RT(R)(M)   No prior studies available for comparison  The breast tissue is almost entirely fat  These images were    obtained using digital technique and with the assistance of    Computer Aided Detection  The breast tissue is predominantly    male pattern  There are no dominant masses, foci of architectural   distortion or suspicious clusters of calcification to suggest    malignancy  The visualized skin and nipples appear normal  A    small moderate amount of left-sided retroareolar tissue is    present consistent with mild asymmetric gynecomastia  Medications that could potentially have gynecomastia as a side    effect are at least partially listed at the following website:    [email or web address]  [email or web address] htm     Targeted ultrasound demonstrates no evidence of hypoechoic mass    or architectural distortion to suggest malignancy  Ultrasound    findings are consistent with gynecomastia  US Breast Left Limited: June 30, 2017 - Check In #: [de-identified]   Standard views  Technologist: RT Rasheed (R), RDMS   Fat lobules and uniformly echogenic bands of supporting    structures (Mathew's ligaments) comprise the bulk of breast    tissue        ACR BI-RADSï¾® Assessments: BiRad:2 - Benign (Overall)   Diag Mammo: BiRad:2 - benign finding in the left breast    Left breast Left Brst US: BiRad:2 - benign finding in the left    breast    Diag Mammo: BiRad:2 - benign finding in the right breast      Recommendation:   Clinical management of the left breast      The patient is scheduled in a reminder system  Transcription Location: [facility]: [id number]     Risk Value(s):   Myriad Table: 1 5%   Signed by:   Melissa Jensen MD   6/30/17     *US BREAST LEFT LIMITED (DIAGNOSTIC) [id number] 01:15PM Annamaria Mccabe Order Number: [id number]   Performing Comments: 46 yo male with 3 months of worsening issues in the left breast, posterior to the areolar complex  No fevers  It is tender  On exam, the patient may have a very soft, sizeable cystic region, behind the areolar complex  No family    hx of breast cancer  Thanks  Noa Case DO   - Patient Instructions: To schedule this appointment, please contact Central Scheduling at [phone]  Test Name Result Flag Reference   US BREAST LEFT LIMITED (Report)     Patient History:   Patient is an every day smoker  Patient's BMI is 24 9  Reason for exam: clinical finding  Mammo Diagnostic Bilateral W DBT and CAD: June 30, 2017 - Check    In #: [de-identified]   2D/3D Procedure   3D views: Bilateral MLO view(s) were taken  2D views: Bilateral CC view(s) were taken  Technologist: RT Danielle(R)(M)   No prior studies available for comparison  The breast tissue is almost entirely fat  These images were    obtained using digital technique and with the assistance of    Computer Aided Detection  The breast tissue is predominantly    male pattern  There are no dominant masses, foci of architectural   distortion or suspicious clusters of calcification to suggest    malignancy  The visualized skin and nipples appear normal  A    small moderate amount of left-sided retroareolar tissue is    present consistent with mild asymmetric gynecomastia       Medications that could potentially have gynecomastia as a side    effect are at least partially listed at the following website: [email or web address]  [email or web address]/tlcdz143 htm     Targeted ultrasound demonstrates no evidence of hypoechoic mass    or architectural distortion to suggest malignancy  Ultrasound    findings are consistent with gynecomastia  US Breast Left Limited: June 30, 2017 - Check In #: [de-identified]   Standard views  Technologist: Roxana Latham RT (R), TANG   Fat lobules and uniformly echogenic bands of supporting    structures (Mathew's ligaments) comprise the bulk of breast    tissue  ACR BI-RADSï¾® Assessments: BiRad:2 - Benign (Overall)   Diag Mammo: BiRad:2 - benign finding in the left breast    Left breast Left Brst US: BiRad:2 - benign finding in the left    breast    Diag Mammo: BiRad:2 - benign finding in the right breast      Recommendation:   Clinical management of the left breast      The patient is scheduled in a reminder system       Transcription Location:  [facility]: [id number]     Risk Value(s):   Myriad Table: 1 5%   Signed by:   Evi Bardales MD   6/30/17

## 2018-01-16 NOTE — RESULT NOTES
Verified Results  (1) CBC/PLT/DIFF [id number] 11:40AM Shubham Padilla     Test Name Result Flag Reference   WHITE BLOOD CELL COUNT 8 5 Thousand/uL  3 8-10 8   RED BLOOD CELL COUNT 5 26 Million/uL  4 20-5 80   HEMOGLOBIN 15 5 g/dL  13 2-17 1   HEMATOCRIT 47 9 %  38 5-50 0   MCV 91 2 fL  80 0-100 0   MCH 29 5 pg  27 0-33 0   MCHC 32 4 g/dL  32 0-36 0   RDW 13 5 %  11 0-15 0   PLATELET COUNT 607 Thousand/uL  140-400   MPV 9 5 fL  7 5-11 5   ABSOLUTE NEUTROPHILS 4429 cells/uL  2419-0542   ABSOLUTE LYMPHOCYTES 2941 cells/uL  850-3900   ABSOLUTE MONOCYTES 340 cells/uL  200-950   ABSOLUTE EOSINOPHILS 774 cells/uL H    ABSOLUTE BASOPHILS 17 cells/uL  0-200   NEUTROPHILS 52 1 %     LYMPHOCYTES 34 6 %     MONOCYTES 4 0 %     EOSINOPHILS 9 1 %     BASOPHILS 0 2 %       (1) COMPREHENSIVE METABOLIC PANEL [id number] 30:98SB Shubham Padilla     Test Name Result Flag Reference   GLUCOSE 88 mg/dL  65-99   Fasting reference interval   UREA NITROGEN (BUN) 16 mg/dL  7-25   CREATININE 0 92 mg/dL  0 70-1 33   For patients >52years of age, the reference limit  for Creatinine is approximately 13% higher for people  identified as -American  eGFR NON-AFR   AMERICAN 96 mL/min/1 73m2  > OR = 60   eGFR AFRICAN AMERICAN 111 mL/min/1 73m2  > OR = 60   BUN/CREATININE RATIO   2-86   NOT APPLICABLE (calc)   SODIUM 141 mmol/L  135-146   POTASSIUM 4 7 mmol/L  3 5-5 3   CHLORIDE 106 mmol/L     CARBON DIOXIDE 26 mmol/L  19-30   CALCIUM 9 6 mg/dL  8 6-10 3   PROTEIN, TOTAL 6 7 g/dL  6 1-8 1   ALBUMIN 4 3 g/dL  3 6-5 1   GLOBULIN 2 4 g/dL (calc)  1 9-3 7   ALBUMIN/GLOBULIN RATIO 1 8 (calc)  1 0-2 5   BILIRUBIN, TOTAL 0 6 mg/dL  0 2-1 2   ALKALINE PHOSPHATASE 53 U/L     AST 23 U/L  10-35   ALT 28 U/L  9-46     (1) LIPID PANEL, FASTING [id number] 11:40AM Randy Shubham     Test Name Result Flag Reference   CHOLESTEROL, TOTAL 269 mg/dL H 125-200   HDL CHOLESTEROL 36 mg/dL L > OR = 40   TRIGLICERIDES 163 mg/dL H <150 LDL-CHOLESTEROL 202 mg/dL (calc) H <130   LDL-C levels > or = 190 mg/dL may indicate familial   hypercholesterolemia (FH)  Clinical assessment and   measurement of blood lipid levels should be considered  for all first degree relatives of patients with an   FH diagnosis  J of Clinical Lipidology 5:S1-S8 2011  Desirable range <100 mg/dL for patients with CHD or  diabetes and <70 mg/dL for diabetic patients with  known heart disease  CHOL/HDLC RATIO 7 5 (calc) H < OR = 5 0   NON HDL CHOLESTEROL 233 mg/dL (calc) H    Target for non-HDL cholesterol is 30 mg/dL higher than   LDL cholesterol target  (1) PSA (SCREEN) (Dx V76 44 Screen for Prostate Cancer) [id number] 11:40AM Kaylee Padilla     Test Name Result Flag Reference   PSA, TOTAL 0 5 ng/mL  < OR = 4 0   This test was performed using the Siemens  chemiluminescent method  Values obtained from  different assay methods cannot be used  interchangeably  PSA levels, regardless of  value, should not be interpreted as absolute  evidence of the presence or absence of disease  (1) LIPASE [id number] 11:40AM Shubham Padilla   REPORT COMMENT:  FASTING:YES     Test Name Result Flag Reference   LIPASE 5 U/L L 7-60       Discussion/Summary   Hello Mr Lima Courts: Your labs were reviewed -> you are not anemic, your glucose (no diabetes), kidney function, liver function tests, pancreas, and prostate testing (the PSA was 0 5) were all very normal   Your cholesterol is pretty high though -> I want you to start working on a low fat diet (and we likely need to consider medication for this at your follow-up)  Complete the CT, and see GI as planned  Follow up here too as planned  Thanks!    Dr Bola Navarrete

## 2018-01-17 NOTE — RESULT NOTES
Verified Results  (1) THROAT CULTURE (CULTURE, UPPER RESPIRATORY) [id number] 06:06PM Shubham Padilla     Test Name Result Flag Reference   CLINICAL REPORT (Report)     Test:        Throat culture  Specimen Type:   Throat  Specimen Date:   3/4/2016 6:06 PM  Result Date:    3/6/2016 7:55 AM  Result Status:   Final result  Resulting Lab:    [address] [address]            Tel: [phone]                 CULTURE                                       ------------------                                   Negative for beta-hemolytic Streptococcus

## 2018-01-17 NOTE — RESULT NOTES
Verified Results  [address] (NON EXTREMITY) [id number] 04:30PM [facility]: [id number]   Performing Comments: 46 yo male with likely benign lipoma in the LLQ of the abdomen, appx 2 cm in size  Thanks  Ivette Martin DO   - Patient Instructions: To schedule this appointment, please contact Central Scheduling at [phone]  Test Name Result Flag Reference   US TRUNK SOFT TISSUE (Report)     SUPERFICIAL LUMP ULTRASOUND     INDICATION: Palpable lump       COMPARISON: None  TECHNIQUE:  Real-time ultrasound of the palpable area of concern was performed with a linear transducer with both volumetric sweeps and still imaging techniques  FINDINGS:      There is a 0 7 x 0 5 x 0 5 cm cystic lesion within the subcutaneous tissues with some internal echoes  There is no solid nodularity or internal vascularity  The lesion does appear to compress  IMPRESSION:     Mildly complex, compressible cystic structure within the subcutaneous tissues corresponding to palpable area of concern  This may represent a sebaceous cyst  Continued clinical follow-up recommended  If there is any change in clinical examination or    concern, follow-up imaging could be performed         Workstation performed: [id number]     Signed by:   Irvin Antoine MD   11/30/17

## 2018-01-22 VITALS
BODY MASS INDEX: 24.93 KG/M2 | DIASTOLIC BLOOD PRESSURE: 85 MMHG | WEIGHT: 164.5 LBS | TEMPERATURE: 97.6 F | HEART RATE: 72 BPM | SYSTOLIC BLOOD PRESSURE: 139 MMHG | HEIGHT: 68 IN

## 2018-01-22 VITALS
BODY MASS INDEX: 25.39 KG/M2 | HEART RATE: 68 BPM | WEIGHT: 167.5 LBS | HEIGHT: 68 IN | DIASTOLIC BLOOD PRESSURE: 54 MMHG | RESPIRATION RATE: 16 BRPM | SYSTOLIC BLOOD PRESSURE: 110 MMHG

## 2018-01-22 VITALS
DIASTOLIC BLOOD PRESSURE: 70 MMHG | HEART RATE: 60 BPM | HEIGHT: 68 IN | BODY MASS INDEX: 25.61 KG/M2 | RESPIRATION RATE: 12 BRPM | SYSTOLIC BLOOD PRESSURE: 110 MMHG | WEIGHT: 169 LBS

## 2018-01-22 VITALS
SYSTOLIC BLOOD PRESSURE: 124 MMHG | HEART RATE: 72 BPM | DIASTOLIC BLOOD PRESSURE: 76 MMHG | TEMPERATURE: 97.6 F | HEIGHT: 68 IN | RESPIRATION RATE: 16 BRPM | WEIGHT: 168.8 LBS | BODY MASS INDEX: 25.58 KG/M2

## 2018-01-22 VITALS
RESPIRATION RATE: 16 BRPM | DIASTOLIC BLOOD PRESSURE: 70 MMHG | HEART RATE: 56 BPM | HEIGHT: 67 IN | BODY MASS INDEX: 26.53 KG/M2 | SYSTOLIC BLOOD PRESSURE: 100 MMHG | WEIGHT: 169 LBS

## 2018-01-23 NOTE — RESULT NOTES
Verified Results  * MRI LUMBAR SPINE WO CONTRAST [id number] 04:45PM Yamil Peralta Order Number: [id number]    - Patient Instructions: To schedule this appointment, please contact Central Scheduling at [phone]  Test Name Result Flag Reference   MRI LUMBAR SPINE WO CONTRAST (Report)     MRI LUMBAR SPINE WITHOUT CONTRAST     INDICATION: Low back and right-sided pain     COMPARISON: MR 5/5/2011     TECHNIQUE: Sagittal T1, sagittal T2, sagittal inversion recovery, axial T1 and axial T2, coronal T2       IMAGE QUALITY: Diagnostic     FINDINGS:     ALIGNMENT: Normal alignment of the lumbar spine  No compression fracture  Minor degenerative retrolisthesis of L3, stable  No scoliosis  Stable posterior fusion L5-S1, interbody graft  Stable superior endplate Schmorl's node T11     MARROW SIGNAL: Normal marrow signal is identified within the visualized bony structures  No discrete marrow lesion  DISTAL CORD AND CONUS: Normal size and signal within the distal cord and conus  The conus ends at the L1 level  PARASPINAL SOFT TISSUES: Paraspinal soft tissues are unremarkable  SACRUM: Normal signal within the sacrum  No evidence of insufficiency or stress fracture  LOWER THORACIC DISC SPACES: Normal disc height and signal  No disc herniation, canal stenosis or foraminal narrowing  LUMBAR DISC SPACES:        L1-L2: Normal      L2-L3: Minor reduction disc height, circumferential bulge, minor facet arthrosis, very slight retrolisthesis of L2     L3-L4: Minor circumferential bulge, facet arthrosis     L4-L5: Facet arthrosis, asymmetric to the right, circumferential bulge with potentially significant right foraminal stenosis  Potentially significant stenosis of the right foramen, correlate for right L4 radiculitis  L5-S1: Interval posterior fusion, independent rods, dual interbody graft, laminectomy  Moderate stenosis right foramen  IMPRESSION:     Similar postoperative changes L5-S1  Moderate right foraminal stenosis  Progression of degenerative changes, asymmetric to the right at L4-L5 potentially significant foraminal stenosis, correlate for right L4 radiculitis  Workstation performed: [id number]     Signed by:    Izabela Hollis MD   12/20/17

## 2018-01-30 DIAGNOSIS — G89.4 CHRONIC PAIN DISORDER: Primary | ICD-10-CM

## 2018-01-30 RX ORDER — FENTANYL 50 UG/H
1 PATCH TRANSDERMAL
COMMUNITY
Start: 2012-04-20 | End: 2018-01-30 | Stop reason: SDUPTHER

## 2018-01-30 RX ORDER — FENTANYL 50 UG/H
1 PATCH TRANSDERMAL
Qty: 5 PATCH | Refills: 0 | Status: SHIPPED | OUTPATIENT
Start: 2018-01-30 | End: 2018-02-14

## 2018-01-30 RX ORDER — FENTANYL 100 UG/H
1 PATCH TRANSDERMAL
Qty: 5 PATCH | Refills: 0 | Status: SHIPPED | OUTPATIENT
Start: 2018-01-30 | End: 2018-02-14

## 2018-01-30 NOTE — TELEPHONE ENCOUNTER
NICOLE HE HAS BEEN SEEING YOU AND IS ON A WEANING SCHEDULE WHICH RAZIA WILL TAKE OVER ONCE HE IS OFF ALL NARCOTIC   HE SHOULD BE ONLY ON 50CG AND I THINK HE SHOULD EVEN BE DECREASING TO 25MCG THEN OFF

## 2018-02-27 DIAGNOSIS — M54.9 BACK PAIN, UNSPECIFIED BACK LOCATION, UNSPECIFIED BACK PAIN LATERALITY, UNSPECIFIED CHRONICITY: Primary | ICD-10-CM

## 2018-02-27 RX ORDER — FENTANYL 100 UG/H
1 PATCH TRANSDERMAL
Qty: 5 PATCH | Refills: 0 | Status: SHIPPED | OUTPATIENT
Start: 2018-02-27 | End: 2018-03-14 | Stop reason: SDUPTHER

## 2018-02-27 RX ORDER — FENTANYL 50 UG/H
1 PATCH TRANSDERMAL
Qty: 5 PATCH | Refills: 0 | Status: SHIPPED | OUTPATIENT
Start: 2018-02-27 | End: 2018-03-14 | Stop reason: SDUPTHER

## 2018-03-14 DIAGNOSIS — M54.9 BACK PAIN, UNSPECIFIED BACK LOCATION, UNSPECIFIED BACK PAIN LATERALITY, UNSPECIFIED CHRONICITY: ICD-10-CM

## 2018-03-14 RX ORDER — FENTANYL 100 UG/H
1 PATCH TRANSDERMAL
Qty: 5 PATCH | Refills: 0 | Status: SHIPPED | OUTPATIENT
Start: 2018-03-14 | End: 2018-03-29

## 2018-03-14 RX ORDER — FENTANYL 50 UG/H
1 PATCH TRANSDERMAL
Qty: 5 PATCH | Refills: 0 | Status: SHIPPED | OUTPATIENT
Start: 2018-03-14 | End: 2018-03-29

## 2018-03-15 DIAGNOSIS — F33.41 RECURRENT MAJOR DEPRESSIVE DISORDER, IN PARTIAL REMISSION (HCC): Primary | ICD-10-CM

## 2018-03-15 RX ORDER — DULOXETIN HYDROCHLORIDE 30 MG/1
CAPSULE, DELAYED RELEASE ORAL
Qty: 60 CAPSULE | Refills: 2 | Status: SHIPPED | OUTPATIENT
Start: 2018-03-15 | End: 2018-05-16 | Stop reason: SDUPTHER

## 2018-04-10 DIAGNOSIS — M54.50 CHRONIC BILATERAL LOW BACK PAIN WITHOUT SCIATICA: Primary | ICD-10-CM

## 2018-04-10 DIAGNOSIS — G89.29 CHRONIC BILATERAL LOW BACK PAIN WITHOUT SCIATICA: Primary | ICD-10-CM

## 2018-04-10 RX ORDER — FENTANYL 100 UG/H
1 PATCH TRANSDERMAL
Qty: 5 PATCH | Refills: 0 | Status: SHIPPED | OUTPATIENT
Start: 2018-04-10 | End: 2018-04-10 | Stop reason: SDUPTHER

## 2018-04-10 RX ORDER — FENTANYL 50 UG/H
1 PATCH TRANSDERMAL
Qty: 10 PATCH | Refills: 0 | Status: SHIPPED | OUTPATIENT
Start: 2018-04-10 | End: 2018-05-10

## 2018-04-10 RX ORDER — FENTANYL 100 UG/H
1 PATCH TRANSDERMAL
Qty: 10 PATCH | Refills: 0 | Status: SHIPPED | OUTPATIENT
Start: 2018-04-10 | End: 2018-05-10

## 2018-05-16 ENCOUNTER — OFFICE VISIT (OUTPATIENT)
Dept: FAMILY MEDICINE CLINIC | Facility: CLINIC | Age: 53
End: 2018-05-16
Payer: MEDICARE

## 2018-05-16 VITALS
WEIGHT: 171.8 LBS | HEIGHT: 68 IN | DIASTOLIC BLOOD PRESSURE: 70 MMHG | HEART RATE: 64 BPM | SYSTOLIC BLOOD PRESSURE: 130 MMHG | BODY MASS INDEX: 26.04 KG/M2 | RESPIRATION RATE: 16 BRPM

## 2018-05-16 DIAGNOSIS — Z12.5 SCREENING FOR PROSTATE CANCER: ICD-10-CM

## 2018-05-16 DIAGNOSIS — J01.01 ACUTE RECURRENT MAXILLARY SINUSITIS: ICD-10-CM

## 2018-05-16 DIAGNOSIS — Z12.11 SCREENING FOR COLON CANCER: ICD-10-CM

## 2018-05-16 DIAGNOSIS — G89.29 CHRONIC BILATERAL LOW BACK PAIN WITHOUT SCIATICA: Chronic | ICD-10-CM

## 2018-05-16 DIAGNOSIS — Z13.1 SCREENING FOR DIABETES MELLITUS: ICD-10-CM

## 2018-05-16 DIAGNOSIS — E78.49 OTHER HYPERLIPIDEMIA: Chronic | ICD-10-CM

## 2018-05-16 DIAGNOSIS — Z00.00 WELLNESS EXAMINATION: Primary | ICD-10-CM

## 2018-05-16 DIAGNOSIS — M54.50 CHRONIC BILATERAL LOW BACK PAIN WITHOUT SCIATICA: Chronic | ICD-10-CM

## 2018-05-16 PROCEDURE — 99213 OFFICE O/P EST LOW 20 MIN: CPT | Performed by: FAMILY MEDICINE

## 2018-05-16 PROCEDURE — G0439 PPPS, SUBSEQ VISIT: HCPCS | Performed by: FAMILY MEDICINE

## 2018-05-16 RX ORDER — FENTANYL 100 UG/H
1 PATCH TRANSDERMAL
Qty: 10 PATCH | Refills: 0 | Status: SHIPPED | OUTPATIENT
Start: 2018-05-16 | End: 2018-06-15

## 2018-05-16 RX ORDER — NAPROXEN 500 MG/1
1 TABLET ORAL EVERY 12 HOURS PRN
COMMUNITY
Start: 2017-12-05 | End: 2019-07-29

## 2018-05-16 RX ORDER — AMOXICILLIN AND CLAVULANATE POTASSIUM 875; 125 MG/1; MG/1
1 TABLET, FILM COATED ORAL 2 TIMES DAILY WITH MEALS
Qty: 20 TABLET | Refills: 0 | Status: SHIPPED | OUTPATIENT
Start: 2018-05-16 | End: 2018-05-26

## 2018-05-16 RX ORDER — DULOXETIN HYDROCHLORIDE 60 MG/1
1 CAPSULE, DELAYED RELEASE ORAL DAILY
COMMUNITY
Start: 2018-01-11 | End: 2019-02-21 | Stop reason: SDUPTHER

## 2018-05-16 RX ORDER — FENTANYL 50 UG/H
1 PATCH TRANSDERMAL
Qty: 10 PATCH | Refills: 0 | Status: SHIPPED | OUTPATIENT
Start: 2018-05-16 | End: 2018-06-15

## 2018-05-16 NOTE — PROGRESS NOTES
Assessment/Plan:    No problem-specific Assessment & Plan notes found for this encounter  Diagnoses and all orders for this visit:    Wellness examination  Comments:  Prabhakar Finney appears well at this time  FBW ordered  He was referred for colon cancer screening with GI loccally  Screening for diabetes mellitus  -     CBC and differential; Future  -     Comprehensive metabolic panel; Future  -     Lipid panel; Future    Screening for prostate cancer  Comments:  PSA included with FBW  Normal MELISSA / prostatic exam today  Orders:  -     PSA, Total Screen    Screening for colon cancer  Comments:  Pt referred locally to GI for colonoscopy  Orders:  -     Cancel: Ambulatory referral to Gastroenterology; Future  -     Ambulatory referral to Gastroenterology; Future    Chronic bilateral low back pain without sciatica  Comments:  [facility] - Fenatanyl patches refilled today - 150mcg/hr total   [place]  Orders:  -     fentaNYL (DURAGESIC) 100 mcg/hr TD 72 hr patch; Place 1 patch on the skin every third day for 30 days Max Daily Amount: 1 patch  -     fentaNYL (DURAGESIC) 50 mcg/hr; Place 1 patch on the skin every third day for 30 days Max Daily Amount: 1 patch    Other hyperlipidemia  Comments:  Pt is continuing Atorvastatin  Acute recurrent maxillary sinusitis  Comments:  Treating at this time with Augmentin x 10 days, OTC Cough and Cold Preps PRN, rest, and good PO hydration  Orders:  -     amoxicillin-clavulanate (AUGMENTIN) 875-125 mg per tablet; Take 1 tablet by mouth 2 (two) times a day with meals for 10 days    Other orders  -     naproxen (NAPROSYN) 500 mg tablet; Take 1 tablet by mouth every 12 (twelve) hours as needed  -     DULoxetine (CYMBALTA) 60 mg delayed release capsule; Take 1 capsule by mouth daily          Subjective:      Patient ID: Kelly Alvarenga is a 47 y o  male      Prabhakar Finney is here for his Annual Wellness Exam   He did try to schedule with Pain Management - had difficulties reported with scheduling  He is in need of FBW and colon cancer screening  His mood is well-controlled with Cymbalta - he still has chronic low back pain; Cymbalta may have helped this some  [place] was [place] today - refill of his Fentanyl patches is appropriate at this time  Has been feeling sick with his sinuses for a few days now  The following portions of the patient's history were reviewed and updated as appropriate: allergies, current medications, past family history, past social history, past surgical history and problem list     Past Medical History:   Diagnosis Date    Anxiety and depression     Chronic back pain     Hypercholesterolemia     last assessed 7/19/17    Hyperlipidemia     Mastitis     Sinusitis        Current Outpatient Prescriptions:     DULoxetine (CYMBALTA) 60 mg delayed release capsule, Take 1 capsule by mouth daily, Disp: , Rfl:     naproxen (NAPROSYN) 500 mg tablet, Take 1 tablet by mouth every 12 (twelve) hours as needed, Disp: , Rfl:     amoxicillin-clavulanate (AUGMENTIN) 875-125 mg per tablet, Take 1 tablet by mouth 2 (two) times a day with meals for 10 days, Disp: 20 tablet, Rfl: 0    atorvastatin (LIPITOR) 10 mg tablet, Take 10 mg by mouth daily, Disp: , Rfl:     fentaNYL (DURAGESIC) 100 mcg/hr TD 72 hr patch, Place 1 patch on the skin every third day for 30 days Max Daily Amount: 1 patch, Disp: 10 patch, Rfl: 0    fentaNYL (DURAGESIC) 50 mcg/hr, Place 1 patch on the skin every third day for 30 days Max Daily Amount: 1 patch, Disp: 10 patch, Rfl: 0    No Known Allergies      Review of Systems   Constitutional: Negative for activity change and fever  HENT: Positive for congestion, rhinorrhea and sinus pressure  Respiratory: Negative for shortness of breath  Cardiovascular: Negative for chest pain  Gastrointestinal: Negative for abdominal pain and blood in stool  Genitourinary: Negative for decreased urine volume and difficulty urinating          No ED    Musculoskeletal: Positive for back pain  Pt with chronic low back pain; hx of prior surgery  Psychiatric/Behavioral: Negative for dysphoric mood  The patient is not nervous/anxious  Objective:      /70 (BP Location: Right arm, Patient Position: Sitting, Cuff Size: Standard)   Pulse 64   Resp 16   Ht 5' 7 72" (1 72 m)   Wt 77 9 kg (171 lb 12 8 oz)   BMI 26 34 kg/m²          Physical Exam   Constitutional: He is oriented to person, place, and time  He appears well-developed and well-nourished  No distress  HENT:   Head: Normocephalic and atraumatic  Right Ear: Hearing, tympanic membrane, external ear and ear canal normal    Left Ear: Hearing, tympanic membrane, external ear and ear canal normal    Nose: Mucosal edema present  Right sinus exhibits maxillary sinus tenderness  Left sinus exhibits maxillary sinus tenderness  Mouth/Throat: Oropharynx is clear and moist  No oropharyngeal exudate  Eyes: Conjunctivae are normal    Neck: Normal range of motion  Neck supple  No thyromegaly present  Cardiovascular: Normal rate, regular rhythm and normal heart sounds  Exam reveals no gallop and no friction rub  No murmur heard  Pulmonary/Chest: Effort normal and breath sounds normal  No respiratory distress  He has no wheezes  He has no rales  Abdominal: Soft  Bowel sounds are normal  He exhibits no distension and no mass  There is no tenderness  There is no rebound and no guarding  Genitourinary: Rectum normal and prostate normal    Musculoskeletal:   Pt with well-healed vertical incision scar over the lower lumbar spine region  Lymphadenopathy:     He has no cervical adenopathy  Neurological: He is alert and oriented to person, place, and time  Skin: He is not diaphoretic  Psychiatric: He has a normal mood and affect  His behavior is normal  Judgment and thought content normal    Nursing note and vitals reviewed

## 2018-05-16 NOTE — PATIENT INSTRUCTIONS
Thank you for enrolling in [facility]  Please follow the instructions below to securely access your online medical record  [facility] allows you to send messages to your doctor, view your test results, renew your prescriptions, schedule appointments, and more  [address] uses Single Sign on (SSO) Technology for you to log in and access our [facility], including [employer]  No more remembering multiple user names and passwords! We are going to guide you through, step by step, to help you set up your [employer] account which will provide access to your [facility] account  How Do I Sign Up? 1  In your Internet browser, go to [email or web address]       2  Click on the   [facility] patient account and then click Dont have an                 Account? Create one now      3  Enter your demographic information and chose a user name (email address) and password  Think of one that is secure and easy to remember  Enter a Referral code if you have one (this is not your [facility] code ) Accept the Terms and Conditions and the Privacy Policy  4  Select your security questions that you will use to reset your password should you forget it  Click Submit  5  Enter your [employer] Activation Code exactly as it appears below  You will not need to use this code after you have completed the sign-up process  If you do not sign up before the expiration date, you must request a new code  [employer] Activation Code: [email or web address]  Expires: 5/19/2018  4:45 PM    6  Confirm your email address  An email confirmation was sent to you  Please open that email and click Confirm your Email   You should then be redirected to our [employer] Single sign on page, where you will log on with the user name and password you created! Proceed to the [employer] Icon to view your personal health information          Additional Information  If you have questions, you can e-mail patient  [email or web address] or call [phone] to talk to our customer support staff  Remember, MyChart is NOT to be used for urgent needs  For medical emergencies, dial 911

## 2018-05-19 DIAGNOSIS — E78.2 MIXED HYPERLIPIDEMIA: Primary | ICD-10-CM

## 2018-05-19 RX ORDER — ATORVASTATIN CALCIUM 10 MG/1
TABLET, FILM COATED ORAL
Qty: 30 TABLET | Refills: 5 | Status: SHIPPED | OUTPATIENT
Start: 2018-05-19 | End: 2018-08-14 | Stop reason: DRUGHIGH

## 2018-06-22 DIAGNOSIS — R52 PAIN: Primary | ICD-10-CM

## 2018-06-22 RX ORDER — FENTANYL 50 UG/H
1 PATCH TRANSDERMAL
Qty: 10 PATCH | Refills: 0 | Status: SHIPPED | OUTPATIENT
Start: 2018-06-22 | End: 2018-07-26 | Stop reason: SDUPTHER

## 2018-06-22 RX ORDER — FENTANYL 100 UG/H
1 PATCH TRANSDERMAL
Qty: 10 PATCH | Refills: 0 | Status: SHIPPED | OUTPATIENT
Start: 2018-06-22 | End: 2018-07-26 | Stop reason: SDUPTHER

## 2018-06-25 DIAGNOSIS — F33.41 RECURRENT MAJOR DEPRESSIVE DISORDER, IN PARTIAL REMISSION (HCC): ICD-10-CM

## 2018-06-25 RX ORDER — DULOXETIN HYDROCHLORIDE 30 MG/1
CAPSULE, DELAYED RELEASE ORAL
Qty: 60 CAPSULE | Refills: 2 | OUTPATIENT
Start: 2018-06-25

## 2018-07-12 ENCOUNTER — APPOINTMENT (OUTPATIENT)
Dept: LAB | Facility: HOSPITAL | Age: 53
End: 2018-07-12
Payer: MEDICARE

## 2018-07-12 ENCOUNTER — TRANSCRIBE ORDERS (OUTPATIENT)
Dept: ADMINISTRATIVE | Facility: HOSPITAL | Age: 53
End: 2018-07-12

## 2018-07-12 DIAGNOSIS — Z13.1 SCREENING FOR DIABETES MELLITUS: ICD-10-CM

## 2018-07-12 LAB
ALBUMIN SERPL BCP-MCNC: 4.2 G/DL (ref 3–5.2)
ALP SERPL-CCNC: 73 U/L (ref 43–122)
ALT SERPL W P-5'-P-CCNC: 31 U/L (ref 9–52)
ANION GAP SERPL CALCULATED.3IONS-SCNC: 11 MMOL/L (ref 5–14)
AST SERPL W P-5'-P-CCNC: 22 U/L (ref 17–59)
BASOPHILS # BLD AUTO: 0 THOUSANDS/ΜL (ref 0–0.1)
BASOPHILS NFR BLD AUTO: 1 % (ref 0–1)
BILIRUB SERPL-MCNC: 0.9 MG/DL
BUN SERPL-MCNC: 13 MG/DL (ref 5–25)
CALCIUM SERPL-MCNC: 9.6 MG/DL (ref 8.4–10.2)
CHLORIDE SERPL-SCNC: 103 MMOL/L (ref 97–108)
CHOLEST SERPL-MCNC: 221 MG/DL
CO2 SERPL-SCNC: 26 MMOL/L (ref 22–30)
CREAT SERPL-MCNC: 0.84 MG/DL (ref 0.7–1.5)
EOSINOPHIL # BLD AUTO: 0.6 THOUSAND/ΜL (ref 0–0.4)
EOSINOPHIL NFR BLD AUTO: 6 % (ref 0–6)
ERYTHROCYTE [DISTWIDTH] IN BLOOD BY AUTOMATED COUNT: 13.4 %
GFR SERPL CREATININE-BSD FRML MDRD: 100 ML/MIN/1.73SQ M
GLUCOSE P FAST SERPL-MCNC: 96 MG/DL (ref 70–99)
HCT VFR BLD AUTO: 49.4 % (ref 41–53)
HDLC SERPL-MCNC: 38 MG/DL (ref 40–59)
HGB BLD-MCNC: 16.7 G/DL (ref 13.5–17.5)
LDLC SERPL CALC-MCNC: 157 MG/DL
LYMPHOCYTES # BLD AUTO: 3.1 THOUSANDS/ΜL (ref 0.5–4)
LYMPHOCYTES NFR BLD AUTO: 33 % (ref 20–50)
MCH RBC QN AUTO: 30.5 PG (ref 26–34)
MCHC RBC AUTO-ENTMCNC: 33.9 G/DL (ref 31–36)
MCV RBC AUTO: 90 FL (ref 80–100)
MONOCYTES # BLD AUTO: 0.5 THOUSAND/ΜL (ref 0.2–0.9)
MONOCYTES NFR BLD AUTO: 5 % (ref 1–10)
NEUTROPHILS # BLD AUTO: 5.2 THOUSANDS/ΜL (ref 1.8–7.8)
NEUTS SEG NFR BLD AUTO: 56 % (ref 45–65)
NONHDLC SERPL-MCNC: 183 MG/DL
PLATELET # BLD AUTO: 203 THOUSANDS/UL (ref 150–450)
PMV BLD AUTO: 9.1 FL (ref 8.9–12.7)
POTASSIUM SERPL-SCNC: 4.2 MMOL/L (ref 3.6–5)
PROT SERPL-MCNC: 7.3 G/DL (ref 5.9–8.4)
RBC # BLD AUTO: 5.49 MILLION/UL (ref 4.5–5.9)
SODIUM SERPL-SCNC: 140 MMOL/L (ref 137–147)
TRIGL SERPL-MCNC: 129 MG/DL
WBC # BLD AUTO: 9.4 THOUSAND/UL (ref 4.5–11)

## 2018-07-12 PROCEDURE — 85025 COMPLETE CBC W/AUTO DIFF WBC: CPT

## 2018-07-12 PROCEDURE — 36415 COLL VENOUS BLD VENIPUNCTURE: CPT

## 2018-07-12 PROCEDURE — 80053 COMPREHEN METABOLIC PANEL: CPT

## 2018-07-12 PROCEDURE — 80061 LIPID PANEL: CPT

## 2018-07-19 ENCOUNTER — OFFICE VISIT (OUTPATIENT)
Dept: GASTROENTEROLOGY | Facility: AMBULARY SURGERY CENTER | Age: 53
End: 2018-07-19
Payer: MEDICARE

## 2018-07-19 VITALS
HEART RATE: 57 BPM | TEMPERATURE: 97.8 F | SYSTOLIC BLOOD PRESSURE: 130 MMHG | WEIGHT: 161.8 LBS | DIASTOLIC BLOOD PRESSURE: 68 MMHG | HEIGHT: 68 IN | BODY MASS INDEX: 24.52 KG/M2

## 2018-07-19 DIAGNOSIS — Z12.11 SCREENING FOR COLON CANCER: ICD-10-CM

## 2018-07-19 PROCEDURE — 99203 OFFICE O/P NEW LOW 30 MIN: CPT | Performed by: INTERNAL MEDICINE

## 2018-07-19 NOTE — PROGRESS NOTES
Consultation - 126 [facility]  Larissa Blackwell 1965 male         Chief Complaint:  Screening for colon cancer    HPI:  78-year-old male with history of chronic back pain was referred for colonoscopy  Patient never had colonoscopy in the past    Patient has regular bowel movements and denies any blood or mucus in the stool  Appetite is good and denies any recent weight loss  Denies any abdominal pain, nausea, or vomiting  Has no heartburn or acid reflux  Denies any difficulty swallowing  REVIEW OF SYSTEMS: Review of Systems   Constitutional: Negative for activity change, appetite change, chills, diaphoresis, fatigue, fever and unexpected weight change  HENT: Negative for ear discharge, ear pain, facial swelling, hearing loss, nosebleeds, sore throat, tinnitus and voice change  Eyes: Negative for pain, discharge, redness, itching and visual disturbance  Respiratory: Negative for apnea, cough, chest tightness, shortness of breath and wheezing  Cardiovascular: Negative for chest pain and palpitations  Gastrointestinal:        As noted in HPI   Endocrine: Negative for cold intolerance, heat intolerance and polyuria  Genitourinary: Negative for difficulty urinating, dysuria, flank pain, hematuria and urgency  Musculoskeletal: Positive for back pain  Negative for arthralgias, gait problem, joint swelling and myalgias  Skin: Negative for rash and wound  Neurological: Negative for dizziness, tremors, seizures, speech difficulty, light-headedness, numbness and headaches  Hematological: Negative for adenopathy  Does not bruise/bleed easily  Psychiatric/Behavioral: Negative for agitation, behavioral problems and confusion  The patient is not nervous/anxious           Past Medical History:   Diagnosis Date    Anxiety and depression     Chronic back pain     Hypercholesterolemia     last assessed 7/19/17    Hyperlipidemia     Mastitis     Sinusitis       Past Surgical History:   Procedure Laterality Date    BACK SURGERY      back sx for fx + disc rupture    BACK SURGERY      lower     Social History     Social History    Marital status: /Civil Union     Spouse name: N/A    Number of children: N/A    Years of education: N/A     Occupational History    Not on file  Social History Main Topics    Smoking status: Current Every Day Smoker     Packs/day: 0 25     Types: Cigarettes    Smokeless tobacco: Never Used      Comment: declined; former smoker as per [employer]    Alcohol use No    Drug use: No    Sexual activity: Not Currently     Other Topics Concern    Not on file     Social History Narrative    No narrative on file     Family History   Problem Relation Age of Onset    Diabetes Mother         mellitus    Stroke Father         syndrome    Hypertension Family      Patient has no known allergies  Current Outpatient Prescriptions   Medication Sig Dispense Refill    atorvastatin (LIPITOR) 10 mg tablet TAKE 1 TABLET DAILY 30 tablet 5    DULoxetine (CYMBALTA) 60 mg delayed release capsule Take 1 capsule by mouth daily      fentaNYL (DURAGESIC) 100 mcg/hr TD 72 hr patch Place 1 patch on the skin every third day Earliest Fill Date: 6/22/18 Max Daily Amount: 1 patch 10 patch 0    fentaNYL (DURAGESIC) 50 mcg/hr Place 1 patch on the skin every third day Earliest Fill Date: 6/22/18 Max Daily Amount: 1 patch 10 patch 0    naproxen (NAPROSYN) 500 mg tablet Take 1 tablet by mouth every 12 (twelve) hours as needed      Na Sulfate-K Sulfate-Mg Sulf (SUPREP BOWEL PREP KIT) 17 5-3 13-1 6 GM/180ML SOLN Take 2 Bottles by mouth see administration instructions Please follow the instructions from the office 2 Bottle 0     No current facility-administered medications for this visit  Blood pressure 130/68, pulse 57, temperature 97 8 °F (36 6 °C), temperature source Tympanic, height 5' 8" (1 727 m), weight 73 4 kg (161 lb 12 8 oz)      PHYSICAL EXAM: Physical Exam   Constitutional: He is oriented to person, place, and time  He appears well-developed  HENT:   Head: Normocephalic and atraumatic  Mouth/Throat: Oropharynx is clear and moist    Eyes: Conjunctivae are normal  Pupils are equal, round, and reactive to light  Right eye exhibits no discharge  Left eye exhibits no discharge  No scleral icterus  Neck: Neck supple  No JVD present  No tracheal deviation present  No thyromegaly present  Cardiovascular: Normal rate, regular rhythm, normal heart sounds and intact distal pulses  Exam reveals no gallop and no friction rub  No murmur heard  Pulmonary/Chest: Effort normal and breath sounds normal  No respiratory distress  He has no wheezes  He has no rales  He exhibits no tenderness  Abdominal: Soft  Bowel sounds are normal  He exhibits no distension and no mass  There is no tenderness  There is no rebound and no guarding  No hernia  Musculoskeletal: He exhibits no edema  Lymphadenopathy:     He has no cervical adenopathy  Neurological: He is alert and oriented to person, place, and time  Skin: Skin is warm and dry  No rash noted  No erythema  Psychiatric: He has a normal mood and affect  His behavior is normal  Thought content normal         Lab Results   Component Value Date    WBC 9 40 07/12/2018    HGB 16 7 07/12/2018    HCT 49 4 07/12/2018    MCV 90 07/12/2018     07/12/2018     Lab Results   Component Value Date    GLUCOSE 79 10/29/2017    CALCIUM 9 6 07/12/2018     07/12/2018    K 4 2 07/12/2018    CO2 26 07/12/2018     07/12/2018    BUN 13 07/12/2018    CREATININE 0 84 07/12/2018     Lab Results   Component Value Date    ALT 31 07/12/2018    AST 22 07/12/2018    ALKPHOS 73 07/12/2018    BILITOT 0 90 07/12/2018     No results found for: INR, PROTIME    Mri Lumbar Spine Wo Contrast    Result Date: 12/20/2017  Impression: Similar postoperative changes L5-S1  Moderate right foraminal stenosis    Progression of degenerative changes, asymmetric to the right at L4-L5 potentially significant foraminal stenosis, correlate for right L4 radiculitis  Workstation performed: [id number]       ASSESSMENT & PLAN:    Screening for colon cancer  Screening for colon cancer - patient is at average risk for colon cancer screening  Rule out colorectal lesions including polyps or malignancy         -Schedule for colonoscopy  -High-fiber diet     -Patient was given instructions about the colonoscopy prep     -Patient was explained about  the risks and benefits of the procedure  Risks including but not limited to bleeding, infection, perforation were explained in detail  Also explained about less than 100% sensitivity with the exam and other alternatives

## 2018-07-19 NOTE — LETTER
July 19, 2018     [facility], [address] [address]    Patient: Fidel Kelly   YOB: 1965   Date of Visit: 7/19/2018       Dear Dr Dara Riggins:    Thank you for referring Myah Holguin to me for evaluation  Below are my notes for this consultation  If you have questions, please do not hesitate to call me  I look forward to following your patient along with you  Sincerely,        Uday Stanley MD        CC: No Recipients  Uday Stanley MD  7/19/2018  3:47 PM  Sign at close encounter  Consultation - [address] [facility]  Fidel Kelly 1965 male         Chief Complaint:  Screening for colon cancer    HPI:  69-year-old male with history of chronic back pain was referred for colonoscopy  Patient never had colonoscopy in the past    Patient has regular bowel movements and denies any blood or mucus in the stool  Appetite is good and denies any recent weight loss  Denies any abdominal pain, nausea, or vomiting  Has no heartburn or acid reflux  Denies any difficulty swallowing  REVIEW OF SYSTEMS: Review of Systems   Constitutional: Negative for activity change, appetite change, chills, diaphoresis, fatigue, fever and unexpected weight change  HENT: Negative for ear discharge, ear pain, facial swelling, hearing loss, nosebleeds, sore throat, tinnitus and voice change  Eyes: Negative for pain, discharge, redness, itching and visual disturbance  Respiratory: Negative for apnea, cough, chest tightness, shortness of breath and wheezing  Cardiovascular: Negative for chest pain and palpitations  Gastrointestinal:        As noted in HPI   Endocrine: Negative for cold intolerance, heat intolerance and polyuria  Genitourinary: Negative for difficulty urinating, dysuria, flank pain, hematuria and urgency  Musculoskeletal: Positive for back pain  Negative for arthralgias, gait problem, joint swelling and myalgias  Skin: Negative for rash and wound  Neurological: Negative for dizziness, tremors, seizures, speech difficulty, light-headedness, numbness and headaches  Hematological: Negative for adenopathy  Does not bruise/bleed easily  Psychiatric/Behavioral: Negative for agitation, behavioral problems and confusion  The patient is not nervous/anxious  Past Medical History:   Diagnosis Date    Anxiety and depression     Chronic back pain     Hypercholesterolemia     last assessed 7/19/17    Hyperlipidemia     Mastitis     Sinusitis       Past Surgical History:   Procedure Laterality Date    BACK SURGERY      back sx for fx + disc rupture    BACK SURGERY      lower     Social History     Social History    Marital status: /Civil Union     Spouse name: N/A    Number of children: N/A    Years of education: N/A     Occupational History    Not on file  Social History Main Topics    Smoking status: Current Every Day Smoker     Packs/day: 0 25     Types: Cigarettes    Smokeless tobacco: Never Used      Comment: declined; former smoker as per [employer]    Alcohol use No    Drug use: No    Sexual activity: Not Currently     Other Topics Concern    Not on file     Social History Narrative    No narrative on file     Family History   Problem Relation Age of Onset    Diabetes Mother         mellitus    Stroke Father         syndrome    Hypertension Family      Patient has no known allergies    Current Outpatient Prescriptions   Medication Sig Dispense Refill    atorvastatin (LIPITOR) 10 mg tablet TAKE 1 TABLET DAILY 30 tablet 5    DULoxetine (CYMBALTA) 60 mg delayed release capsule Take 1 capsule by mouth daily      fentaNYL (DURAGESIC) 100 mcg/hr TD 72 hr patch Place 1 patch on the skin every third day Earliest Fill Date: 6/22/18 Max Daily Amount: 1 patch 10 patch 0    fentaNYL (DURAGESIC) 50 mcg/hr Place 1 patch on the skin every third day Earliest Fill Date: 6/22/18 Max Daily Amount: 1 patch 10 patch 0    naproxen (NAPROSYN) 500 mg tablet Take 1 tablet by mouth every 12 (twelve) hours as needed      Na Sulfate-K Sulfate-Mg Sulf (SUPREP BOWEL PREP KIT) 17 5-3 13-1 6 GM/180ML SOLN Take 2 Bottles by mouth see administration instructions Please follow the instructions from the office 2 Bottle 0     No current facility-administered medications for this visit  Blood pressure 130/68, pulse 57, temperature 97 8 °F (36 6 °C), temperature source Tympanic, height 5' 8" (1 727 m), weight 73 4 kg (161 lb 12 8 oz)  PHYSICAL EXAM: Physical Exam   Constitutional: He is oriented to person, place, and time  He appears well-developed  HENT:   Head: Normocephalic and atraumatic  Mouth/Throat: Oropharynx is clear and moist    Eyes: Conjunctivae are normal  Pupils are equal, round, and reactive to light  Right eye exhibits no discharge  Left eye exhibits no discharge  No scleral icterus  Neck: Neck supple  No JVD present  No tracheal deviation present  No thyromegaly present  Cardiovascular: Normal rate, regular rhythm, normal heart sounds and intact distal pulses  Exam reveals no gallop and no friction rub  No murmur heard  Pulmonary/Chest: Effort normal and breath sounds normal  No respiratory distress  He has no wheezes  He has no rales  He exhibits no tenderness  Abdominal: Soft  Bowel sounds are normal  He exhibits no distension and no mass  There is no tenderness  There is no rebound and no guarding  No hernia  Musculoskeletal: He exhibits no edema  Lymphadenopathy:     He has no cervical adenopathy  Neurological: He is alert and oriented to person, place, and time  Skin: Skin is warm and dry  No rash noted  No erythema  Psychiatric: He has a normal mood and affect   His behavior is normal  Thought content normal         Lab Results   Component Value Date    WBC 9 40 07/12/2018    HGB 16 7 07/12/2018    HCT 49 4 07/12/2018    MCV 90 07/12/2018     07/12/2018     Lab Results   Component Value Date    GLUCOSE 79 10/29/2017    CALCIUM 9 6 07/12/2018     07/12/2018    K 4 2 07/12/2018    CO2 26 07/12/2018     07/12/2018    BUN 13 07/12/2018    CREATININE 0 84 07/12/2018     Lab Results   Component Value Date    ALT 31 07/12/2018    AST 22 07/12/2018    ALKPHOS 73 07/12/2018    BILITOT 0 90 07/12/2018     No results found for: INR, PROTIME    Mri Lumbar Spine Wo Contrast    Result Date: 12/20/2017  Impression: Similar postoperative changes L5-S1  Moderate right foraminal stenosis  Progression of degenerative changes, asymmetric to the right at L4-L5 potentially significant foraminal stenosis, correlate for right L4 radiculitis  Workstation performed: [id number]       ASSESSMENT & PLAN:    Screening for colon cancer  Screening for colon cancer - patient is at average risk for colon cancer screening  Rule out colorectal lesions including polyps or malignancy         -Schedule for colonoscopy  -High-fiber diet     -Patient was given instructions about the colonoscopy prep     -Patient was explained about  the risks and benefits of the procedure  Risks including but not limited to bleeding, infection, perforation were explained in detail  Also explained about less than 100% sensitivity with the exam and other alternatives

## 2018-07-26 DIAGNOSIS — R52 PAIN: ICD-10-CM

## 2018-07-26 RX ORDER — FENTANYL 50 UG/H
1 PATCH TRANSDERMAL
Qty: 10 PATCH | Refills: 0 | Status: SHIPPED | OUTPATIENT
Start: 2018-07-26 | End: 2018-09-11 | Stop reason: SDUPTHER

## 2018-07-26 RX ORDER — FENTANYL 100 UG/H
1 PATCH TRANSDERMAL
Qty: 10 PATCH | Refills: 0 | Status: SHIPPED | OUTPATIENT
Start: 2018-07-26 | End: 2018-09-11 | Stop reason: SDUPTHER

## 2018-08-06 ENCOUNTER — PREP FOR PROCEDURE (OUTPATIENT)
Dept: GASTROENTEROLOGY | Facility: AMBULARY SURGERY CENTER | Age: 53
End: 2018-08-06

## 2018-08-06 DIAGNOSIS — Z12.11 ENCOUNTER FOR SCREENING COLONOSCOPY: Primary | ICD-10-CM

## 2018-08-08 ENCOUNTER — ANESTHESIA EVENT (OUTPATIENT)
Dept: PERIOP | Facility: AMBULARY SURGERY CENTER | Age: 53
End: 2018-08-08
Payer: MEDICARE

## 2018-08-13 ENCOUNTER — ANESTHESIA (OUTPATIENT)
Dept: PERIOP | Facility: AMBULARY SURGERY CENTER | Age: 53
End: 2018-08-13
Payer: MEDICARE

## 2018-08-13 ENCOUNTER — HOSPITAL ENCOUNTER (OUTPATIENT)
Facility: AMBULARY SURGERY CENTER | Age: 53
Setting detail: OUTPATIENT SURGERY
Discharge: HOME | End: 2018-08-13
Attending: INTERNAL MEDICINE | Admitting: INTERNAL MEDICINE
Payer: MEDICARE

## 2018-08-13 VITALS
OXYGEN SATURATION: 98 % | DIASTOLIC BLOOD PRESSURE: 76 MMHG | BODY MASS INDEX: 23.04 KG/M2 | WEIGHT: 152 LBS | HEIGHT: 68 IN | RESPIRATION RATE: 16 BRPM | SYSTOLIC BLOOD PRESSURE: 110 MMHG | HEART RATE: 59 BPM | TEMPERATURE: 97.8 F

## 2018-08-13 DIAGNOSIS — Z12.11 ENCOUNTER FOR SCREENING COLONOSCOPY: ICD-10-CM

## 2018-08-13 PROCEDURE — 88305 TISSUE EXAM BY PATHOLOGIST: CPT | Performed by: PATHOLOGY

## 2018-08-13 PROCEDURE — 45380 COLONOSCOPY AND BIOPSY: CPT | Performed by: INTERNAL MEDICINE

## 2018-08-13 RX ORDER — SODIUM CHLORIDE 9 MG/ML
INJECTION, SOLUTION INTRAVENOUS CONTINUOUS PRN
Status: DISCONTINUED | OUTPATIENT
Start: 2018-08-13 | End: 2018-08-13 | Stop reason: SURG

## 2018-08-13 RX ORDER — PROPOFOL 10 MG/ML
INJECTION, EMULSION INTRAVENOUS CONTINUOUS PRN
Status: DISCONTINUED | OUTPATIENT
Start: 2018-08-13 | End: 2018-08-13 | Stop reason: SURG

## 2018-08-13 RX ORDER — SODIUM CHLORIDE, SODIUM LACTATE, POTASSIUM CHLORIDE, CALCIUM CHLORIDE 600; 310; 30; 20 MG/100ML; MG/100ML; MG/100ML; MG/100ML
125 INJECTION, SOLUTION INTRAVENOUS CONTINUOUS
Status: DISCONTINUED | OUTPATIENT
Start: 2018-08-13 | End: 2018-08-13 | Stop reason: HOSPADM

## 2018-08-13 RX ORDER — LIDOCAINE HYDROCHLORIDE 10 MG/ML
INJECTION, SOLUTION INFILTRATION; PERINEURAL AS NEEDED
Status: DISCONTINUED | OUTPATIENT
Start: 2018-08-13 | End: 2018-08-13 | Stop reason: SURG

## 2018-08-13 RX ORDER — PROPOFOL 10 MG/ML
INJECTION, EMULSION INTRAVENOUS AS NEEDED
Status: DISCONTINUED | OUTPATIENT
Start: 2018-08-13 | End: 2018-08-13 | Stop reason: SURG

## 2018-08-13 RX ADMIN — PROPOFOL 100 MG: 10 INJECTION, EMULSION INTRAVENOUS at 10:32

## 2018-08-13 RX ADMIN — SODIUM CHLORIDE: 0.9 INJECTION, SOLUTION INTRAVENOUS at 10:27

## 2018-08-13 RX ADMIN — LIDOCAINE HYDROCHLORIDE 50 MG: 10 INJECTION, SOLUTION INFILTRATION; PERINEURAL at 10:32

## 2018-08-13 RX ADMIN — PROPOFOL 150 MCG/KG/MIN: 10 INJECTION, EMULSION INTRAVENOUS at 10:32

## 2018-08-13 NOTE — ANESTHESIA POSTPROCEDURE EVALUATION
Post-Op Assessment Note      CV Status:  Stable    Mental Status:  Alert and awake    Hydration Status:  Euvolemic    PONV Controlled:  Controlled    Airway Patency:  Patent    Post Op Vitals Reviewed: Yes          Staff: CRNA           BP   113/67   Temp      Pulse  64   Resp   18   SpO2   96

## 2018-08-13 NOTE — DISCHARGE INSTRUCTIONS
Colonoscopy   WHAT YOU NEED TO KNOW:   A colonoscopy is a procedure to examine the inside of your colon (intestine) with a scope  Polyps or tissue growths may have been removed during your colonoscopy  It is normal to feel bloated and to have some abdominal discomfort  You should be passing gas  If you have hemorrhoids or you had polyps removed, you may have a small amount of bleeding  DISCHARGE INSTRUCTIONS:   Seek care immediately if:   · You have a large amount of bright red blood in your bowel movements  · Your abdomen is hard and firm and you have severe pain  · You have sudden trouble breathing  Contact your healthcare provider if:   · You develop a rash or hives  · You have a fever within 24 hours of your procedure  · You have not had a bowel movement for 3 days after your procedure  · You have questions or concerns about your condition or care  Activity:   · Do not lift, strain, or run  for 3 days after your procedure  · Rest after your procedure  You have been given medicine to relax you  Do not  drive or make important decisions until the day after your procedure  Return to your normal activity as directed  · Relieve gas and discomfort from bloating  by lying on your right side with a heating pad on your abdomen  You may need to take short walks to help the gas move out  Eat small meals until bloating is relieved  If you had polyps removed: For 7 days after your procedure:  · Do not  take aspirin  · Do not  go on long car rides  Help prevent constipation:   · Eat a variety of healthy foods  Healthy foods include fruit, vegetables, whole-grain breads, low-fat dairy products, beans, lean meat, and fish  Ask if you need to be on a special diet  Your healthcare provider may recommend that you eat high-fiber foods such as cooked beans  Fiber helps you have regular bowel movements  · Drink liquids as directed    Adults should drink between 9 and 13 eight-ounce cups of liquid every day  Ask what amount is best for you  For most people, good liquids to drink are water, juice, and milk  · Exercise as directed  Talk to your healthcare provider about the best exercise plan for you  Exercise can help prevent constipation, decrease your blood pressure and improve your health  Follow up with your healthcare provider as directed:  Write down your questions so you remember to ask them during your visits  © 2017 [employer] Information is for End User's use only and may not be sold, redistributed or otherwise used for commercial purposes  All illustrations and images included in CareNotes® are the copyrighted property of [employer]  or [employer]  The above information is an  only  It is not intended as medical advice for individual conditions or treatments  Talk to your doctor, nurse or pharmacist before following any medical regimen to see if it is safe and effective for you

## 2018-08-13 NOTE — OP NOTE
COLONOSCOPY    PROCEDURE: Colonoscopy/ Polypectomny (Cold Biopsy)    INDICATIONS: Screening for Colon Cancer    POST-OP DIAGNOSIS: See the impression below    SEDATION: Monitored anesthesia care, check anesthesia records    PHYSICAL EXAM:    /74   Pulse 62   Temp 98 °F (36 7 °C) (Temporal)   Resp 18   Ht 5' 8" (1 727 m)   Wt 68 9 kg (152 lb)   SpO2 97%   BMI 23 11 kg/m²   Body mass index is 23 11 kg/m²  General: NAD  Heart: S1 & S2 normal, RRR  Lungs: CTA, No rales or rhonchi  Abdomen: Soft, nontender, nondistended, good bowel sounds    CONSENT:  Informed consent was obtained for the procedure, including sedation after explaining the risks and benefits of the procedure  Risks including but not limited to bleeding, perforation, infection, aspiration were discussed in detail  Also explained about less than 100%$ sensitivity with the exam and other alternatives  PREPARATION:   EKG tracing, pulse oximetry, blood pressure were monitored throughout the procedure  Patient was identified by myself both verbally and by visual inspection of ID band  DESCRIPTION:   Patient was placed in the left lateral decubitus position and was sedated with the above medication  Digital rectal examination was performed  The colonoscope was introduced in to the anal canal and advanced up to cecum, which was identified by the appendiceal orifice and IC valve  A careful inspection was made as the colonoscope was withdrawn, including a retroflexed view of the rectum; findings and interventions are described below  Appropriate photodocumentation was obtained  The quality of the colonic preparation was adequate  FINDINGS:    1  Cecum and ileocecal valve-normal mucosa    2  Ascending colon-diminutive polyp was removed with cold biopsy forceps    3    Internal hemorrhoids         IMPRESSIONS:      As above    RECOMMENDATIONS:    Check pathology    COMPLICATIONS:  None; patient tolerated the procedure well     DISPOSITION: PACU           CONDITION: Stable

## 2018-08-13 NOTE — ANESTHESIA PREPROCEDURE EVALUATION
Review of Systems/Medical History          Cardiovascular  Hyperlipidemia,    Pulmonary  Smoker ,        GI/Hepatic            Endo/Other     GYN       Hematology   Musculoskeletal       Neurology   Psychology           Physical Exam    Airway    Mallampati score: II         Dental   No notable dental hx     Cardiovascular      Pulmonary      Other Findings        Anesthesia Plan  ASA Score- 2     Anesthesia Type- IV sedation with anesthesia with ASA Monitors  Additional Monitors:   Airway Plan:     Comment: I, Dr Isela Lopez, the attending physician, have personally seen and evaluated the patient prior to anesthetic care  I have reviewed the pre-anesthetic record, and other medical records if appropriate to the anesthetic care  If a CRNA is involved in the case, I have reviewed the CRNA assessment, if present, and agree  The patient is in a suitable condition to proceed with my formulated anesthetic plan        Plan Factors-    Induction- intravenous  Postoperative Plan-     Informed Consent- Anesthetic plan and risks discussed with patient  I personally reviewed this patient with the CRNA  Discussed and agreed on the Anesthesia Plan with the CRNA  Aravind Vasquez

## 2018-08-13 NOTE — H&P (VIEW-ONLY)
Consultation - [address] [facility]  Tuliojoyce Susie 1965 male         Chief Complaint:  Screening for colon cancer    HPI:  43-year-old male with history of chronic back pain was referred for colonoscopy  Patient never had colonoscopy in the past    Patient has regular bowel movements and denies any blood or mucus in the stool  Appetite is good and denies any recent weight loss  Denies any abdominal pain, nausea, or vomiting  Has no heartburn or acid reflux  Denies any difficulty swallowing  REVIEW OF SYSTEMS: Review of Systems   Constitutional: Negative for activity change, appetite change, chills, diaphoresis, fatigue, fever and unexpected weight change  HENT: Negative for ear discharge, ear pain, facial swelling, hearing loss, nosebleeds, sore throat, tinnitus and voice change  Eyes: Negative for pain, discharge, redness, itching and visual disturbance  Respiratory: Negative for apnea, cough, chest tightness, shortness of breath and wheezing  Cardiovascular: Negative for chest pain and palpitations  Gastrointestinal:        As noted in HPI   Endocrine: Negative for cold intolerance, heat intolerance and polyuria  Genitourinary: Negative for difficulty urinating, dysuria, flank pain, hematuria and urgency  Musculoskeletal: Positive for back pain  Negative for arthralgias, gait problem, joint swelling and myalgias  Skin: Negative for rash and wound  Neurological: Negative for dizziness, tremors, seizures, speech difficulty, light-headedness, numbness and headaches  Hematological: Negative for adenopathy  Does not bruise/bleed easily  Psychiatric/Behavioral: Negative for agitation, behavioral problems and confusion  The patient is not nervous/anxious           Past Medical History:   Diagnosis Date    Anxiety and depression     Chronic back pain     Hypercholesterolemia     last assessed 7/19/17    Hyperlipidemia     Mastitis     Sinusitis       Past Surgical History:   Procedure Laterality Date    BACK SURGERY      back sx for fx + disc rupture    BACK SURGERY      lower     Social History     Social History    Marital status: /Civil Union     Spouse name: N/A    Number of children: N/A    Years of education: N/A     Occupational History    Not on file  Social History Main Topics    Smoking status: Current Every Day Smoker     Packs/day: 0 25     Types: Cigarettes    Smokeless tobacco: Never Used      Comment: declined; former smoker as per [employer]    Alcohol use No    Drug use: No    Sexual activity: Not Currently     Other Topics Concern    Not on file     Social History Narrative    No narrative on file     Family History   Problem Relation Age of Onset    Diabetes Mother         mellitus    Stroke Father         syndrome    Hypertension Family      Patient has no known allergies  Current Outpatient Prescriptions   Medication Sig Dispense Refill    atorvastatin (LIPITOR) 10 mg tablet TAKE 1 TABLET DAILY 30 tablet 5    DULoxetine (CYMBALTA) 60 mg delayed release capsule Take 1 capsule by mouth daily      fentaNYL (DURAGESIC) 100 mcg/hr TD 72 hr patch Place 1 patch on the skin every third day Earliest Fill Date: 6/22/18 Max Daily Amount: 1 patch 10 patch 0    fentaNYL (DURAGESIC) 50 mcg/hr Place 1 patch on the skin every third day Earliest Fill Date: 6/22/18 Max Daily Amount: 1 patch 10 patch 0    naproxen (NAPROSYN) 500 mg tablet Take 1 tablet by mouth every 12 (twelve) hours as needed      Na Sulfate-K Sulfate-Mg Sulf (SUPREP BOWEL PREP KIT) 17 5-3 13-1 6 GM/180ML SOLN Take 2 Bottles by mouth see administration instructions Please follow the instructions from the office 2 Bottle 0     No current facility-administered medications for this visit  Blood pressure 130/68, pulse 57, temperature 97 8 °F (36 6 °C), temperature source Tympanic, height 5' 8" (1 727 m), weight 73 4 kg (161 lb 12 8 oz)      PHYSICAL EXAM: Physical Exam   Constitutional: He is oriented to person, place, and time  He appears well-developed  HENT:   Head: Normocephalic and atraumatic  Mouth/Throat: Oropharynx is clear and moist    Eyes: Conjunctivae are normal  Pupils are equal, round, and reactive to light  Right eye exhibits no discharge  Left eye exhibits no discharge  No scleral icterus  Neck: Neck supple  No JVD present  No tracheal deviation present  No thyromegaly present  Cardiovascular: Normal rate, regular rhythm, normal heart sounds and intact distal pulses  Exam reveals no gallop and no friction rub  No murmur heard  Pulmonary/Chest: Effort normal and breath sounds normal  No respiratory distress  He has no wheezes  He has no rales  He exhibits no tenderness  Abdominal: Soft  Bowel sounds are normal  He exhibits no distension and no mass  There is no tenderness  There is no rebound and no guarding  No hernia  Musculoskeletal: He exhibits no edema  Lymphadenopathy:     He has no cervical adenopathy  Neurological: He is alert and oriented to person, place, and time  Skin: Skin is warm and dry  No rash noted  No erythema  Psychiatric: He has a normal mood and affect  His behavior is normal  Thought content normal         Lab Results   Component Value Date    WBC 9 40 07/12/2018    HGB 16 7 07/12/2018    HCT 49 4 07/12/2018    MCV 90 07/12/2018     07/12/2018     Lab Results   Component Value Date    GLUCOSE 79 10/29/2017    CALCIUM 9 6 07/12/2018     07/12/2018    K 4 2 07/12/2018    CO2 26 07/12/2018     07/12/2018    BUN 13 07/12/2018    CREATININE 0 84 07/12/2018     Lab Results   Component Value Date    ALT 31 07/12/2018    AST 22 07/12/2018    ALKPHOS 73 07/12/2018    BILITOT 0 90 07/12/2018     No results found for: INR, PROTIME    Mri Lumbar Spine Wo Contrast    Result Date: 12/20/2017  Impression: Similar postoperative changes L5-S1  Moderate right foraminal stenosis    Progression of degenerative changes, asymmetric to the right at L4-L5 potentially significant foraminal stenosis, correlate for right L4 radiculitis  Workstation performed: [id number]       ASSESSMENT & PLAN:    Screening for colon cancer  Screening for colon cancer - patient is at average risk for colon cancer screening  Rule out colorectal lesions including polyps or malignancy         -Schedule for colonoscopy  -High-fiber diet     -Patient was given instructions about the colonoscopy prep     -Patient was explained about  the risks and benefits of the procedure  Risks including but not limited to bleeding, infection, perforation were explained in detail  Also explained about less than 100% sensitivity with the exam and other alternatives

## 2018-08-14 ENCOUNTER — OFFICE VISIT (OUTPATIENT)
Dept: FAMILY MEDICINE CLINIC | Facility: CLINIC | Age: 53
End: 2018-08-14
Payer: MEDICARE

## 2018-08-14 VITALS
HEART RATE: 65 BPM | DIASTOLIC BLOOD PRESSURE: 78 MMHG | RESPIRATION RATE: 16 BRPM | SYSTOLIC BLOOD PRESSURE: 110 MMHG | BODY MASS INDEX: 24.18 KG/M2 | WEIGHT: 159 LBS

## 2018-08-14 DIAGNOSIS — M54.50 CHRONIC BILATERAL LOW BACK PAIN WITHOUT SCIATICA: Primary | Chronic | ICD-10-CM

## 2018-08-14 DIAGNOSIS — E78.49 OTHER HYPERLIPIDEMIA: Chronic | ICD-10-CM

## 2018-08-14 DIAGNOSIS — G89.29 CHRONIC BILATERAL LOW BACK PAIN WITHOUT SCIATICA: Primary | Chronic | ICD-10-CM

## 2018-08-14 PROCEDURE — 99213 OFFICE O/P EST LOW 20 MIN: CPT | Performed by: FAMILY MEDICINE

## 2018-08-14 RX ORDER — ATORVASTATIN CALCIUM 20 MG/1
20 TABLET, FILM COATED ORAL DAILY
Qty: 30 TABLET | Refills: 5 | Status: SHIPPED | OUTPATIENT
Start: 2018-08-14 | End: 2019-02-21 | Stop reason: SDUPTHER

## 2018-08-14 NOTE — PROGRESS NOTES
Assessment/Plan:    No problem-specific Assessment & Plan notes found for this encounter  Diagnoses and all orders for this visit:    Chronic bilateral low back pain without sciatica  Comments:  Chidi is stable on exam   Pt recently had Fentanyl Patches refilled ([place] PDMP already checked, and appropriate)  Pt will f/u with Pain Management too  Other hyperlipidemia  Comments: We will increase his Atorvastatin to 20mg QHS  He is to continue a healthy diet, and to be as active as he can  Repeat FBW ordered  Orders:  -     atorvastatin (LIPITOR) 20 mg tablet; Take 1 tablet (20 mg total) by mouth daily  -     Comprehensive metabolic panel; Future  -     Lipid Panel with Direct LDL reflex; Future          Subjective:      Patient ID: Asya Dahl is a 47 y o  male  Chana Quiroga is doing well  Had colonoscopy just yesterday; did well; one polyp found - pathology pending  Has seen Pain Management - Possible epidural pending  Mood has been good  Is taking Atorvastatin - LDL though just at 157; Gluc 96; LFTs normal         The following portions of the patient's history were reviewed and updated as appropriate: allergies, current medications, past family history, past social history, past surgical history and problem list     Past Medical History:   Diagnosis Date    Anxiety and depression     Chronic back pain     Hypercholesterolemia     last assessed 7/19/17    Hyperlipidemia     Mastitis     Sinusitis      Past Surgical History:   Procedure Laterality Date    BACK SURGERY      back sx for fx + disc rupture    BACK SURGERY      lower    [place] COLONOSCOPY FLX DX W/COLLJ SPEC WHEN PFRMD N/A 8/13/2018    Procedure: COLONOSCOPY;  Surgeon: Lizabeth Macias MD;  [facility];   Service: Gastroenterology       Current Outpatient Prescriptions:     atorvastatin (LIPITOR) 20 mg tablet, Take 1 tablet (20 mg total) by mouth daily, Disp: 30 tablet, Rfl: 5    DULoxetine (CYMBALTA) 60 mg delayed release capsule, Take 1 capsule by mouth daily, Disp: , Rfl:     fentaNYL (DURAGESIC) 100 mcg/hr TD 72 hr patch, Place 1 patch on the skin every third day Earliest Fill Date: 7/26/18 Max Daily Amount: 1 patch, Disp: 10 patch, Rfl: 0    fentaNYL (DURAGESIC) 50 mcg/hr, Place 1 patch on the skin every third day Earliest Fill Date: 7/26/18 Max Daily Amount: 1 patch, Disp: 10 patch, Rfl: 0    naproxen (NAPROSYN) 500 mg tablet, Take 1 tablet by mouth every 12 (twelve) hours as needed, Disp: , Rfl:     No Known Allergies    Review of Systems   Constitutional: Negative for activity change  Respiratory: Negative for shortness of breath  Cardiovascular: Negative for chest pain  Musculoskeletal: Positive for back pain  Negative for myalgias  Objective:      /78 (BP Location: Left arm, Patient Position: Sitting, Cuff Size: Standard)   Pulse 65   Resp 16   Wt 72 1 kg (159 lb)   BMI 24 18 kg/m²          Physical Exam   Constitutional: He is oriented to person, place, and time  He appears well-developed and well-nourished  No distress  HENT:   Head: Normocephalic and atraumatic  Eyes: Conjunctivae are normal    Neck: Normal range of motion  Neck supple  No thyromegaly present  Cardiovascular: Normal rate, regular rhythm and normal heart sounds  Exam reveals no gallop and no friction rub  No murmur heard  Pulmonary/Chest: Effort normal and breath sounds normal  No respiratory distress  He has no wheezes  He has no rales  Lymphadenopathy:     He has no cervical adenopathy  Neurological: He is alert and oriented to person, place, and time  Skin: He is not diaphoretic  Psychiatric: He has a normal mood and affect  His behavior is normal  Judgment and thought content normal    Nursing note and vitals reviewed

## 2018-08-16 ENCOUNTER — TELEPHONE (OUTPATIENT)
Dept: GASTROENTEROLOGY | Facility: CLINIC | Age: 53
End: 2018-08-16

## 2018-08-16 NOTE — TELEPHONE ENCOUNTER
----- Message from Salena Miller MD sent at 8/15/2018  5:47 PM EDT -----    Colon polyps removed came back as precancerous tubular adenoma  Next colonoscopy in 3 years  Patient to call our office for any GI symptoms

## 2018-09-11 DIAGNOSIS — R52 PAIN: Primary | ICD-10-CM

## 2018-09-11 RX ORDER — FENTANYL 50 UG/H
1 PATCH TRANSDERMAL
Qty: 10 PATCH | Refills: 0 | Status: SHIPPED | OUTPATIENT
Start: 2018-09-11 | End: 2018-10-11

## 2018-09-11 RX ORDER — FENTANYL 100 UG/H
1 PATCH TRANSDERMAL
Qty: 10 PATCH | Refills: 0 | OUTPATIENT
Start: 2018-09-11 | End: 2018-10-11

## 2018-09-11 RX ORDER — FENTANYL 100 UG/H
1 PATCH TRANSDERMAL
Qty: 10 PATCH | Refills: 0 | Status: SHIPPED | OUTPATIENT
Start: 2018-09-11 | End: 2018-10-11

## 2018-09-18 ENCOUNTER — OFFICE VISIT (OUTPATIENT)
Dept: PAIN MEDICINE | Facility: CLINIC | Age: 53
End: 2018-09-18
Payer: MEDICARE

## 2018-09-18 VITALS
HEIGHT: 68 IN | BODY MASS INDEX: 24.1 KG/M2 | WEIGHT: 159 LBS | SYSTOLIC BLOOD PRESSURE: 127 MMHG | TEMPERATURE: 98.2 F | DIASTOLIC BLOOD PRESSURE: 73 MMHG

## 2018-09-18 DIAGNOSIS — M48.062 SPINAL STENOSIS OF LUMBAR REGION WITH NEUROGENIC CLAUDICATION: ICD-10-CM

## 2018-09-18 DIAGNOSIS — M54.16 LUMBAR RADICULOPATHY: Primary | ICD-10-CM

## 2018-09-18 DIAGNOSIS — M54.41 CHRONIC BILATERAL LOW BACK PAIN WITH BILATERAL SCIATICA: ICD-10-CM

## 2018-09-18 DIAGNOSIS — G89.29 CHRONIC BILATERAL LOW BACK PAIN WITH BILATERAL SCIATICA: ICD-10-CM

## 2018-09-18 DIAGNOSIS — M54.42 CHRONIC BILATERAL LOW BACK PAIN WITH BILATERAL SCIATICA: ICD-10-CM

## 2018-09-18 DIAGNOSIS — M51.26 LUMBAR DISC HERNIATION: ICD-10-CM

## 2018-09-18 PROCEDURE — 99214 OFFICE O/P EST MOD 30 MIN: CPT | Performed by: NURSE PRACTITIONER

## 2018-09-18 RX ORDER — GABAPENTIN 300 MG/1
300 CAPSULE ORAL 3 TIMES DAILY
Qty: 90 CAPSULE | Refills: 0 | Status: SHIPPED | OUTPATIENT
Start: 2018-09-18 | End: 2018-10-17 | Stop reason: SDUPTHER

## 2018-09-18 NOTE — PROGRESS NOTES
Assessment:  1  Lumbar radiculopathy    2  Chronic bilateral low back pain with bilateral sciatica    3  Lumbar disc herniation    4  Spinal stenosis of lumbar region with neurogenic claudication        Plan:  1  I will schedule the patient for a right L5 and S1 TFESI for the inflammatory component of the patient's pain  He has had previous epidural injections in the past without much relief, however this was some time ago  Complete risks and benefits including bleeding, infection, tissue reaction, nerve injury and allergic reaction were discussed  The approach was demonstrated using models and literature was provided  Verbal and written consent was obtained  2   Will initiate the patient on gabapentin 300 milligrams q h s , titrating up to 300 milligrams t i d   A titration schedule was provided to the patient today  Patient was educated on medications most common side effects which include dizziness, drowsiness, and swelling of the hands and feet  Patient was instructed to call the office with any adverse medication side effects  The patient is also aware that if they would like to come off of the medication, they need to let us know as suddenly stopping the medication puts them at risk to have a seizure  3   Patient may continue on Duloxetine 60 milligrams daily by his PCP for his neuropathic pain, and may benefit from an increase to 90 milligrams daily  4   The patient can continue on naproxen 500 milligrams 1 tablet b i d  p r n   Educated risks of mixing NSAIDS (also known as non-steroidal anti-inflammatory pills) including advil, ibuprofen, motrin, aleve, naproxen, aspirin, and ibuprofen containing products with each other or with steroids (such as prednisone, medrol)  Explained risks of mixing these medications including stomach ulcer, severe internal bleeding, and kidney failure  Instructed not to take NSAIDS if have history of stomach ulcers, kidney issues, or hypertension   Instructed patient to use only one brand as prescribed  For naproxen, a maximum of 500 mg per dose every 12 hours and no more than two doses or 1,000mg per day  Patient expressed understanding and agreed to plan  5   I did have a very jc conversation with the patient regarding the amount of opioid medication he is taking, as he is currently taking approximately 300 milligrams of oral morphine daily which is over 3 times more than the recommended amount of 90 oral morphine milliequivalents a day or less by the [address] [place] [address] for non cancer pain  I did explain to the patient that he is likely hyperalgesic from being on such high dose opioid regiment for such a long period of time, as he states it has been about 9 years  He was also previously managed on high-dose oxycodone therapy prior to the Fentanyl  He will discuss weaning down on the fentanyl patch with his PCP  As recommended in our last office note, it is recommended that the patient be weaned down to fentanyl 100 micrograms/hour Q 72 hours before considering an opioid rotation  Once the patient is down to this dose of fentanyl, a regimen such as Nucynta  milligrams every 12 hours with Nucynta 50 milligrams IR every 12 hours p r n  for breakthrough pain may be reasonable  The goal would then be to wean the Nucynta down as tolerated as well  6   If the patient fails conservative therapy such as medications and injections, we will consider evaluation from Neurosurgery for further surgical intervention for spinal cord stimulator  7   Patient will continue with his home exercise program  8  The patient will follow-up in 4 weeks for medication prescription refill and reevaluation  The patient was advised to contact the office should their symptoms worsen in the interim  The patient was agreeable and verbalized an understanding      [place] Prescription Drug Monitoring Program report was reviewed and was appropriate     I attest that I have spent at least 25 minutes face to face with the patient and that at least 50% of the time was spent educating and/or discussing the patient's symptoms and treatment plan options  History of Present Illness: The patient is a 47 y o  male with a history of L5-S1 fusion in 1999 after work-related injury where he was thrown against a tree trunk last seen on December 5, 2017 who presents for a follow up office visit in regards to chronic pain secondary to lumbar post laminectomy syndrome, lumbar degenerative disc disease, lumbar stenosis and chronic pain syndrome  At today's office visit, the patient's main pain complaint is his some lumbosacral back pain that radiates in the posterior aspect of his right lower extremity down to the foot in an S1 dermatomal distribution with associated numbness, paresthesias, and subjective weakness  He denies any left lower extremity symptoms, bowel or bladder incontinence, or saddle anesthesia  MRI of the lumbar spine reveals postoperative changes from L5-S1 with moderate right foraminal stenosis at L5-S1, as well as progression of degenerative changes with significant right foraminal stenosis at L4-5  The patient has had epidural injections in the past years ago in [place] which did provide relief however the relief was not long lasting  He is currently being managed by his primary care physician on high-dose opioid therapy including fentanyl patch 150 micrograms/hour Q 72 hours, which he states is not adequately controlling his pain and barely provides relief  He is also on Duloxetine 60 milligrams daily for his neuropathic pain complaints, and naproxen 500 milligrams b i d  P r n  He denies any side effects to the current medication regimen  He has never tried gabapentin or Lyrica  He has also never tried muscle relaxants    He has tried and failed OxyContin and oxycodone in the past     At today's office visit, he rates his pain an 8/10 on the numeric pain rating scale  He states his pain is constant in nature and most bothersome in the morning  He characterizes the pain as burning, sharp and pins and needles  I have personally reviewed and/or updated the patient's past medical history, past surgical history, family history, social history, current medications, allergies, and vital signs today  Review of Systems:    Review of Systems   Musculoskeletal: Positive for back pain and gait problem  Past Medical History:   Diagnosis Date    Anxiety and depression     Chronic back pain     Hypercholesterolemia     last assessed 7/19/17    Hyperlipidemia     Mastitis     Sinusitis        Past Surgical History:   Procedure Laterality Date    BACK SURGERY      back sx for fx + disc rupture    BACK SURGERY      lower    [place] COLONOSCOPY FLX DX W/COLLJ SPEC WHEN PFRMD N/A 8/13/2018    Procedure: COLONOSCOPY;  Surgeon: Stefanie Dejesus MD;  [facility]; Service: Gastroenterology       Family History   Problem Relation Age of Onset    Diabetes Mother         mellitus    Stroke Father         syndrome    Hypertension Family        Social History     Occupational History    Not on file       Social History Main Topics    Smoking status: Current Every Day Smoker     Packs/day: 0 25     Types: Cigarettes    Smokeless tobacco: Never Used      Comment: declined; former smoker as per [employer]    Alcohol use No    Drug use: No    Sexual activity: Not on file         Current Outpatient Prescriptions:     atorvastatin (LIPITOR) 20 mg tablet, Take 1 tablet (20 mg total) by mouth daily, Disp: 30 tablet, Rfl: 5    DULoxetine (CYMBALTA) 60 mg delayed release capsule, Take 1 capsule by mouth daily, Disp: , Rfl:     fentaNYL (DURAGESIC) 100 mcg/hr TD 72 hr patch, Place 1 patch on the skin every third day for 30 days Max Daily Amount: 1 patch, Disp: 10 patch, Rfl: 0    fentaNYL (DURAGESIC) 50 mcg/hr, Place 1 patch on the skin every third day for 30 days Max Daily Amount: 1 patch, Disp: 10 patch, Rfl: 0    naproxen (NAPROSYN) 500 mg tablet, Take 1 tablet by mouth every 12 (twelve) hours as needed, Disp: , Rfl:     gabapentin (NEURONTIN) 300 mg capsule, Take 1 capsule (300 mg total) by mouth 3 (three) times a day, Disp: 90 capsule, Rfl: 0    No Known Allergies    Physical Exam:    /73   Temp 98 2 °F (36 8 °C)   Ht 5' 8" (1 727 m)   Wt 72 1 kg (159 lb)   BMI 24 18 kg/m²     Constitutional:normal, well developed, well nourished, alert, in no distress and non-toxic and no overt pain behavior  Eyes:anicteric  HEENT:grossly intact  Neck:supple, symmetric, trachea midline and no masses   Pulmonary:even and unlabored  Cardiovascular:No edema or pitting edema present  Skin:Normal without rashes or lesions and well hydrated  Psychiatric:Mood and affect appropriate  Neurologic:Cranial Nerves II-XII grossly intact  Musculoskeletal:Slightly antalgic, but steady without the use of assistive devices       Lumbar Spine Exam    Appearance:  Normal lordosis  Palpation/Tenderness:  left lumbar paraspinal tenderness  right lumbar paraspinal tenderness   Bilateral SI joints nontender to palpation  Sensory:   Decreased sensation to light touch over the posterior aspect of the right lower extremity from the back of his thigh to his foot  Range of Motion:   Decreased and painful range of motion in all planes of the lumbar spine  Motor Strength:   Strength 5/5 in all muscle groups of the bilateral lower extremities  Special Tests:   Positive seated straight leg raise on the right and negative on the left      Imaging  FL spine and pain procedure    (Results Pending)     MRI LUMBAR SPINE WITHOUT CONTRAST 12/19/17     INDICATION:  Low back and right-sided pain     COMPARISON:  MR 5/5/2011     TECHNIQUE:  Sagittal T1, sagittal T2, sagittal inversion recovery, axial T1 and axial T2, coronal T2        IMAGE QUALITY:  Diagnostic     FINDINGS:     ALIGNMENT:  Normal alignment of the lumbar spine   No compression fracture  Minor degenerative retrolisthesis of L3, stable  No scoliosis  Stable posterior fusion L5-S1, interbody graft  Stable superior endplate Schmorl's node T11     MARROW SIGNAL:  Normal marrow signal is identified within the visualized bony structures  No discrete marrow lesion      DISTAL CORD AND CONUS:  Normal size and signal within the distal cord and conus  The conus ends at the L1 level      PARASPINAL SOFT TISSUES:  Paraspinal soft tissues are unremarkable      SACRUM:  Normal signal within the sacrum  No evidence of insufficiency or stress fracture      LOWER THORACIC DISC SPACES:  Normal disc height and signal   No disc herniation, canal stenosis or foraminal narrowing      LUMBAR DISC SPACES:          L1-L2:  Normal      L2-L3:  Minor reduction disc height, circumferential bulge, minor facet arthrosis, very slight retrolisthesis of L2     L3-L4:  Minor circumferential bulge, facet arthrosis     L4-L5:  Facet arthrosis, asymmetric to the right, circumferential bulge with potentially significant right foraminal stenosis  Potentially significant stenosis of the right foramen, correlate for right L4 radiculitis      L5-S1:  Interval posterior fusion, independent rods, dual interbody graft, laminectomy  Moderate stenosis right foramen      IMPRESSION:     Similar postoperative changes L5-S1  Moderate right foraminal stenosis  Progression of degenerative changes, asymmetric to the right at L4-L5 potentially significant foraminal stenosis, correlate for right L4 radiculitis        Orders Placed This Encounter   Procedures    FL spine and pain procedure

## 2018-09-25 ENCOUNTER — TELEPHONE (OUTPATIENT)
Dept: RADIOLOGY | Facility: CLINIC | Age: 53
End: 2018-09-25

## 2018-09-25 NOTE — TELEPHONE ENCOUNTER
Called pt to remind him of appt tomorrow, 9/26 for RT L5 & S1 TFESI- pt states he is sick and has a fever, cancelled procedure  Pt will cb to rs

## 2018-10-16 DIAGNOSIS — M54.16 LUMBAR RADICULOPATHY: ICD-10-CM

## 2018-10-16 RX ORDER — GABAPENTIN 300 MG/1
CAPSULE ORAL
Qty: 90 CAPSULE | Refills: 0 | OUTPATIENT
Start: 2018-10-16

## 2018-10-16 NOTE — TELEPHONE ENCOUNTER
S/W pt  Advised pt of the same  Pt requesting refill as listed, has about 1 week left and helps his pain anali  Pt stated he forgot about his appt on 10/15 and he has an injection w/ JW tomorrow and he will schedule a SOVS at that time  Please advise

## 2018-10-16 NOTE — TELEPHONE ENCOUNTER
Patient was supposed to be scheduled for a 4 week f/u visit after his 9/18 office visit  Can we please schedule this if he does not already have one? Does the patient need refills of gabapentin?

## 2018-10-17 ENCOUNTER — HOSPITAL ENCOUNTER (OUTPATIENT)
Dept: RADIOLOGY | Facility: CLINIC | Age: 53
Discharge: HOME | End: 2018-10-17
Attending: ANESTHESIOLOGY | Admitting: ANESTHESIOLOGY
Payer: MEDICARE

## 2018-10-17 VITALS
SYSTOLIC BLOOD PRESSURE: 123 MMHG | OXYGEN SATURATION: 96 % | DIASTOLIC BLOOD PRESSURE: 84 MMHG | RESPIRATION RATE: 18 BRPM | TEMPERATURE: 98.6 F | HEART RATE: 81 BPM

## 2018-10-17 DIAGNOSIS — G89.29 OTHER CHRONIC PAIN: Primary | ICD-10-CM

## 2018-10-17 DIAGNOSIS — M54.16 LUMBAR BACK PAIN WITH RADICULOPATHY AFFECTING LOWER EXTREMITY: ICD-10-CM

## 2018-10-17 DIAGNOSIS — M54.16 LUMBAR RADICULOPATHY: ICD-10-CM

## 2018-10-17 PROCEDURE — 64484 NJX AA&/STRD TFRM EPI L/S EA: CPT | Performed by: ANESTHESIOLOGY

## 2018-10-17 PROCEDURE — 64483 NJX AA&/STRD TFRM EPI L/S 1: CPT | Performed by: ANESTHESIOLOGY

## 2018-10-17 RX ORDER — GABAPENTIN 300 MG/1
300 CAPSULE ORAL 3 TIMES DAILY
Qty: 90 CAPSULE | Refills: 0 | Status: SHIPPED | OUTPATIENT
Start: 2018-10-17 | End: 2018-11-14 | Stop reason: SDUPTHER

## 2018-10-17 RX ORDER — FENTANYL 50 UG/H
1 PATCH TRANSDERMAL
Qty: 5 PATCH | Refills: 0 | Status: SHIPPED | OUTPATIENT
Start: 2018-10-17 | End: 2018-10-19 | Stop reason: SDUPTHER

## 2018-10-17 RX ORDER — PAPAVERINE HCL 150 MG
20 CAPSULE, EXTENDED RELEASE ORAL ONCE
Status: COMPLETED | OUTPATIENT
Start: 2018-10-17 | End: 2018-10-17

## 2018-10-17 RX ORDER — FENTANYL 100 UG/H
1 PATCH TRANSDERMAL
Qty: 5 PATCH | Refills: 0 | Status: SHIPPED | OUTPATIENT
Start: 2018-10-17 | End: 2018-10-19 | Stop reason: SDUPTHER

## 2018-10-17 RX ORDER — LIDOCAINE HYDROCHLORIDE 10 MG/ML
5 INJECTION, SOLUTION EPIDURAL; INFILTRATION; INTRACAUDAL; PERINEURAL ONCE
Status: COMPLETED | OUTPATIENT
Start: 2018-10-17 | End: 2018-10-17

## 2018-10-17 RX ADMIN — LIDOCAINE HYDROCHLORIDE 2 ML: 20 INJECTION, SOLUTION EPIDURAL; INFILTRATION; INTRACAUDAL; PERINEURAL at 11:54

## 2018-10-17 RX ADMIN — DEXAMETHASONE SODIUM PHOSPHATE 15 MG: 10 INJECTION, SOLUTION INTRAMUSCULAR; INTRAVENOUS at 11:54

## 2018-10-17 RX ADMIN — LIDOCAINE HYDROCHLORIDE 4 ML: 10 INJECTION, SOLUTION EPIDURAL; INFILTRATION; INTRACAUDAL; PERINEURAL at 11:44

## 2018-10-17 RX ADMIN — IOHEXOL 2 ML: 300 INJECTION, SOLUTION INTRAVENOUS at 11:49

## 2018-10-17 NOTE — DISCHARGE INSTR - LAB
Epidural Steroid Injection   WHAT YOU NEED TO KNOW:   An epidural steroid injection (MAXI) is a procedure to inject steroid medicine into the epidural space  The epidural space is between your spinal cord and vertebrae  Steroids reduce inflammation and fluid buildup in your spine that may be causing pain  You may be given pain medicine along with the steroids  ACTIVITY  · Do not drive or operate machinery today  · No strenuous activity today - bending, lifting, etc   · You may resume normal activites starting tomorrow - start slowly and as tolerated  · You may shower today, but no tub baths or hot tubs  · You may have numbness for several hours from the local anesthetic  Please use caution and common sense, especially with weight-bearing activities  CARE OF THE INJECTION SITE  · If you have soreness or pain, apply ice to the area today (20 minutes on/20 minutes off)  · Starting tomorrow, you may use warm, moist heat or ice if needed  · You may have an increase or change in your discomfort for 36-48 hours after your treatment  · Apply ice and continue with any pain medication you have been prescribed  · Notify [facility] if you have any of the following: redness, drainage, swelling, headache, stiff neck or fever above 100°F     SPECIAL INSTRUCTIONS  · Our office will contact you in approximately 7 days for a progress report  MEDICATIONS  · Continue to take all routine medications  · Our office may have instructed you to hold some medications  If you have a problem specifically related to your procedure, please call our office at [phone]  Problems not related to your procedure should be directed to your primary care physician

## 2018-10-17 NOTE — H&P
History of Present Illness: The patient is a 47 y o  male who presents with complaints of low back and leg pain  Patient Active Problem List   Diagnosis    Chest pain in adult    Other hyperlipidemia    Chronic low back pain    Diaphoresis    Screening for colon cancer    Encounter for screening colonoscopy       Past Medical History:   Diagnosis Date    Anxiety and depression     Chronic back pain     Hypercholesterolemia     last assessed 7/19/17    Hyperlipidemia     Mastitis     Sinusitis        Past Surgical History:   Procedure Laterality Date    BACK SURGERY      back sx for fx + disc rupture    BACK SURGERY      lower    AR COLONOSCOPY FLX DX W/COLLJ SPEC WHEN PFRMD N/A 8/13/2018    Procedure: COLONOSCOPY;  Surgeon: Clara Gutierrez MD;  [facility];   Service: Gastroenterology         Current Outpatient Prescriptions:     atorvastatin (LIPITOR) 20 mg tablet, Take 1 tablet (20 mg total) by mouth daily, Disp: 30 tablet, Rfl: 5    DULoxetine (CYMBALTA) 60 mg delayed release capsule, Take 1 capsule by mouth daily, Disp: , Rfl:     gabapentin (NEURONTIN) 300 mg capsule, Take 1 capsule (300 mg total) by mouth 3 (three) times a day, Disp: 90 capsule, Rfl: 0    naproxen (NAPROSYN) 500 mg tablet, Take 1 tablet by mouth every 12 (twelve) hours as needed, Disp: , Rfl:     Current Facility-Administered Medications:     dexamethasone (PF) (DECADRON) injection 20 mg, 20 mg, Intra-articular, Once, Kieran Escamilla, DO    iohexol (OMNIPAQUE) 300 mg/mL injection 50 mL, 50 mL, Injection, Once, Kieran Escamilla, DO    lidocaine (PF) (XYLOCAINE-MPF) 1 % injection 5 mL, 5 mL, Intra-articular, Once, Gilberto Escamilla, DO    lidocaine (PF) (XYLOCAINE-MPF) 2 % injection 2 mL, 2 mL, Intra-articular, Once, Luis Alberto Escamilla, DO    No Known Allergies    Physical Exam:   Vitals:    10/17/18 1125   BP: 103/63   Pulse: (!) 51   Resp: 20   Temp: 98 6 °F (37 °C)   SpO2: 98%     General: Awake, Alert, Oriented x 3, Mood and affect appropriate  Respiratory: Respirations even and unlabored  Cardiovascular: Peripheral pulses intact; no edema  Musculoskeletal Exam:   Right lumbar paraspinals tender to palpation  ASA Score: 2    Patient/Chart Verification  ID Band Applied: No  Consents Confirmed: Procedural, To be obtained in the Pre-Procedure area  H&P( within 30 days) Verified: To be obtained in the Pre-Procedure area  Interval H&P(within 24 hr) Complete (required for Outpatients and Surgery Admit only): To be obtained in the Pre-Procedure area  Allergies Reviewed: Yes  Anticoag/NSAID held?: NA  Currently on antibiotics?: No    Assessment:   1   Lumbar radiculopathy        Plan: Right L5 & S1 TFESI

## 2018-10-19 ENCOUNTER — OFFICE VISIT (OUTPATIENT)
Dept: FAMILY MEDICINE CLINIC | Facility: CLINIC | Age: 53
End: 2018-10-19
Payer: MEDICARE

## 2018-10-19 VITALS
OXYGEN SATURATION: 97 % | BODY MASS INDEX: 24.4 KG/M2 | TEMPERATURE: 97.6 F | HEART RATE: 64 BPM | HEIGHT: 68 IN | DIASTOLIC BLOOD PRESSURE: 70 MMHG | RESPIRATION RATE: 16 BRPM | SYSTOLIC BLOOD PRESSURE: 118 MMHG | WEIGHT: 161 LBS

## 2018-10-19 DIAGNOSIS — M54.16 LUMBAR BACK PAIN WITH RADICULOPATHY AFFECTING LOWER EXTREMITY: ICD-10-CM

## 2018-10-19 DIAGNOSIS — G89.29 OTHER CHRONIC PAIN: ICD-10-CM

## 2018-10-19 DIAGNOSIS — G89.29 CHRONIC BILATERAL LOW BACK PAIN WITHOUT SCIATICA: Primary | Chronic | ICD-10-CM

## 2018-10-19 DIAGNOSIS — M54.50 CHRONIC BILATERAL LOW BACK PAIN WITHOUT SCIATICA: Primary | Chronic | ICD-10-CM

## 2018-10-19 PROCEDURE — 99213 OFFICE O/P EST LOW 20 MIN: CPT | Performed by: FAMILY MEDICINE

## 2018-10-19 RX ORDER — FENTANYL 50 UG/H
1 PATCH TRANSDERMAL
Qty: 10 PATCH | Refills: 0 | Status: SHIPPED | OUTPATIENT
Start: 2018-10-19 | End: 2018-12-13 | Stop reason: SDUPTHER

## 2018-10-19 RX ORDER — FENTANYL 100 UG/H
1 PATCH TRANSDERMAL
Qty: 10 PATCH | Refills: 0 | Status: SHIPPED | OUTPATIENT
Start: 2018-10-19 | End: 2018-12-13 | Stop reason: SDUPTHER

## 2018-10-19 NOTE — PROGRESS NOTES
Assessment/Plan:    No problem-specific Assessment & Plan notes found for this encounter  Diagnoses and all orders for this visit:    Chronic bilateral low back pain without sciatica  Comments:  Pt is doing well right now  He is to continue regular f/u with Pain Management too  Fentanyl patch scripts re-done  [place]MP [place]  Other chronic pain  -     fentaNYL (DURAGESIC) 100 mcg/hr TD 72 hr patch; Place 1 patch on the skin every third day Max Daily Amount: 1 patch  -     fentaNYL (DURAGESIC) 50 mcg/hr; Place 1 patch on the skin every third day Max Daily Amount: 1 patch    Lumbar back pain with radiculopathy affecting lower extremity  -     fentaNYL (DURAGESIC) 100 mcg/hr TD 72 hr patch; Place 1 patch on the skin every third day Max Daily Amount: 1 patch  -     fentaNYL (DURAGESIC) 50 mcg/hr; Place 1 patch on the skin every third day Max Daily Amount: 1 patch          Subjective:      Patient ID: Enedina Farmer is a 47 y o  male  He is doing pretty well right now - had lumbar epidural 2 days ago with Dr Irineo Colunga of Pain Management; has not yet helped, but knows that he has to give it time  On Gabapentin now TID, and is helping some  Sees Pain Management again in November  [place] PDMP checked and appropriate - a problem with his refill yesterday (only for 5 patches)            The following portions of the patient's history were reviewed and updated as appropriate: allergies, current medications, past family history, past social history, past surgical history and problem list     Past Medical History:   Diagnosis Date    Anxiety and depression     Chronic back pain     Hypercholesterolemia     last assessed 7/19/17    Hyperlipidemia     Mastitis     Sinusitis      Past Surgical History:   Procedure Laterality Date    BACK SURGERY      back sx for fx + disc rupture    BACK SURGERY      lower    LA COLONOSCOPY FLX DX W/COLLJ SPEC WHEN PFRMD N/A 8/13/2018    Procedure: COLONOSCOPY; Surgeon: Leilani Lowery MD;  Location[facility]; Service: Gastroenterology       Current Outpatient Prescriptions:     atorvastatin (LIPITOR) 20 mg tablet, Take 1 tablet (20 mg total) by mouth daily, Disp: 30 tablet, Rfl: 5    DULoxetine (CYMBALTA) 60 mg delayed release capsule, Take 1 capsule by mouth daily, Disp: , Rfl:     fentaNYL (DURAGESIC) 100 mcg/hr TD 72 hr patch, Place 1 patch on the skin every third day Max Daily Amount: 1 patch, Disp: 10 patch, Rfl: 0    fentaNYL (DURAGESIC) 50 mcg/hr, Place 1 patch on the skin every third day Max Daily Amount: 1 patch, Disp: 10 patch, Rfl: 0    gabapentin (NEURONTIN) 300 mg capsule, Take 1 capsule (300 mg total) by mouth 3 (three) times a day, Disp: 90 capsule, Rfl: 0    naproxen (NAPROSYN) 500 mg tablet, Take 1 tablet by mouth every 12 (twelve) hours as needed, Disp: , Rfl:     No Known Allergies    Review of Systems   Constitutional: Negative for activity change  Respiratory: Negative for shortness of breath  Cardiovascular: Negative for chest pain  Musculoskeletal: Positive for back pain  Psychiatric/Behavioral: Negative for dysphoric mood  The patient is not nervous/anxious  Objective:      /70 (BP Location: Right arm, Patient Position: Sitting, Cuff Size: Standard)   Pulse 64   Temp 97 6 °F (36 4 °C) (Tympanic)   Resp 16   Ht 5' 8" (1 727 m)   Wt 73 kg (161 lb)   SpO2 97%   BMI 24 48 kg/m²          Physical Exam   Constitutional: He is oriented to person, place, and time  He appears well-developed and well-nourished  No distress  HENT:   Head: Normocephalic and atraumatic  Eyes: Conjunctivae are normal    Neck: Normal range of motion  Neck supple  No thyromegaly present  Cardiovascular: Normal rate, regular rhythm and normal heart sounds  Exam reveals no gallop and no friction rub  No murmur heard  Pulmonary/Chest: Effort normal and breath sounds normal  No respiratory distress  He has no wheezes   He has no rales    Musculoskeletal:   Spine benign on exam; still some residual soreness in the lower back secondary to his recent injection  Lymphadenopathy:     He has no cervical adenopathy  Neurological: He is alert and oriented to person, place, and time  Skin: He is not diaphoretic  Psychiatric: He has a normal mood and affect  His behavior is normal  Judgment and thought content normal    Nursing note and vitals reviewed

## 2018-10-26 ENCOUNTER — TELEPHONE (OUTPATIENT)
Dept: RADIOLOGY | Facility: CLINIC | Age: 53
End: 2018-10-26

## 2018-11-06 NOTE — TELEPHONE ENCOUNTER
Pt left message in voicemail at 12:39 this afternoon, stating that he received the CNR letter in the mail  Pt asking for a call back  He left a call back number of [phone]

## 2018-11-07 NOTE — TELEPHONE ENCOUNTER
I left another message for patient   Asking him to give us a cb with update on his % relief and pain level

## 2018-11-07 NOTE — TELEPHONE ENCOUNTER
Pt returned call and states 0% relief  His current pain level is a 9  Pt is aware of his f/u visit on 11/27 and will call back if pain becomes unbearable until then  Pt can be reached at [phone]

## 2018-11-14 DIAGNOSIS — M54.16 LUMBAR RADICULOPATHY: ICD-10-CM

## 2018-11-14 RX ORDER — GABAPENTIN 300 MG/1
300 CAPSULE ORAL 3 TIMES DAILY
Qty: 90 CAPSULE | Refills: 0 | Status: SHIPPED | OUTPATIENT
Start: 2018-11-14 | End: 2018-11-30 | Stop reason: SDUPTHER

## 2018-11-14 RX ORDER — GABAPENTIN 300 MG/1
CAPSULE ORAL
Qty: 90 CAPSULE | Refills: 0 | OUTPATIENT
Start: 2018-11-14

## 2018-11-14 NOTE — TELEPHONE ENCOUNTER
S/w pt, confirmed with him that he does need a refill, he is taking 300 mg TID, pt is tolerating dose with no s/e  Pharmacy confirmed

## 2018-11-22 DIAGNOSIS — E78.2 MIXED HYPERLIPIDEMIA: ICD-10-CM

## 2018-11-22 RX ORDER — ATORVASTATIN CALCIUM 10 MG/1
TABLET, FILM COATED ORAL
Qty: 30 TABLET | Refills: 5 | Status: SHIPPED | OUTPATIENT
Start: 2018-11-22 | End: 2018-11-30

## 2018-11-30 ENCOUNTER — CLINICAL SUPPORT (OUTPATIENT)
Dept: PAIN MEDICINE | Facility: CLINIC | Age: 53
End: 2018-11-30
Payer: MEDICARE

## 2018-11-30 VITALS
HEART RATE: 70 BPM | BODY MASS INDEX: 23.79 KG/M2 | HEIGHT: 68 IN | WEIGHT: 157 LBS | TEMPERATURE: 98.2 F | SYSTOLIC BLOOD PRESSURE: 136 MMHG | DIASTOLIC BLOOD PRESSURE: 85 MMHG

## 2018-11-30 DIAGNOSIS — M54.16 LUMBAR RADICULOPATHY: Primary | ICD-10-CM

## 2018-11-30 DIAGNOSIS — M96.1 LUMBAR POST-LAMINECTOMY SYNDROME: ICD-10-CM

## 2018-11-30 DIAGNOSIS — G89.4 CHRONIC PAIN SYNDROME: ICD-10-CM

## 2018-11-30 DIAGNOSIS — M47.816 LUMBAR SPONDYLOSIS: ICD-10-CM

## 2018-11-30 DIAGNOSIS — M48.061 LUMBAR FORAMINAL STENOSIS: ICD-10-CM

## 2018-11-30 PROCEDURE — 99214 OFFICE O/P EST MOD 30 MIN: CPT | Performed by: ANESTHESIOLOGY

## 2018-11-30 RX ORDER — GABAPENTIN 400 MG/1
400 CAPSULE ORAL 3 TIMES DAILY
Qty: 90 CAPSULE | Refills: 2 | Status: SHIPPED | OUTPATIENT
Start: 2018-11-30 | End: 2019-02-26 | Stop reason: SDUPTHER

## 2018-12-13 DIAGNOSIS — G89.29 OTHER CHRONIC PAIN: ICD-10-CM

## 2018-12-13 DIAGNOSIS — M54.16 LUMBAR BACK PAIN WITH RADICULOPATHY AFFECTING LOWER EXTREMITY: ICD-10-CM

## 2018-12-14 RX ORDER — FENTANYL 50 UG/H
1 PATCH TRANSDERMAL
Qty: 10 PATCH | Refills: 0 | Status: SHIPPED | OUTPATIENT
Start: 2018-12-14 | End: 2019-01-17 | Stop reason: SDUPTHER

## 2018-12-14 RX ORDER — FENTANYL 100 UG/H
1 PATCH TRANSDERMAL
Qty: 10 PATCH | Refills: 0 | Status: SHIPPED | OUTPATIENT
Start: 2018-12-14 | End: 2019-01-17 | Stop reason: SDUPTHER

## 2019-01-17 DIAGNOSIS — G89.29 OTHER CHRONIC PAIN: ICD-10-CM

## 2019-01-17 DIAGNOSIS — M54.16 LUMBAR BACK PAIN WITH RADICULOPATHY AFFECTING LOWER EXTREMITY: ICD-10-CM

## 2019-01-17 RX ORDER — FENTANYL 50 UG/H
1 PATCH TRANSDERMAL
Qty: 10 PATCH | Refills: 0 | Status: SHIPPED | OUTPATIENT
Start: 2019-01-17 | End: 2019-02-21 | Stop reason: SDUPTHER

## 2019-01-17 RX ORDER — FENTANYL 100 UG/H
1 PATCH TRANSDERMAL
Qty: 10 PATCH | Refills: 0 | Status: SHIPPED | OUTPATIENT
Start: 2019-01-17 | End: 2019-02-21 | Stop reason: SDUPTHER

## 2019-01-28 ENCOUNTER — HOSPITAL ENCOUNTER (OUTPATIENT)
Dept: RADIOLOGY | Facility: HOSPITAL | Age: 54
Discharge: HOME | End: 2019-01-28
Attending: ANESTHESIOLOGY
Payer: MEDICARE

## 2019-01-28 DIAGNOSIS — M54.16 LUMBAR RADICULOPATHY: ICD-10-CM

## 2019-01-28 PROCEDURE — 72148 MRI LUMBAR SPINE W/O DYE: CPT

## 2019-02-21 ENCOUNTER — OFFICE VISIT (OUTPATIENT)
Dept: FAMILY MEDICINE CLINIC | Facility: CLINIC | Age: 54
End: 2019-02-21
Payer: MEDICARE

## 2019-02-21 VITALS
HEART RATE: 60 BPM | RESPIRATION RATE: 12 BRPM | OXYGEN SATURATION: 97 % | SYSTOLIC BLOOD PRESSURE: 112 MMHG | BODY MASS INDEX: 26.58 KG/M2 | HEIGHT: 68 IN | WEIGHT: 175.4 LBS | DIASTOLIC BLOOD PRESSURE: 70 MMHG

## 2019-02-21 DIAGNOSIS — E78.49 OTHER HYPERLIPIDEMIA: Chronic | ICD-10-CM

## 2019-02-21 DIAGNOSIS — G89.4 CHRONIC PAIN SYNDROME: Chronic | ICD-10-CM

## 2019-02-21 DIAGNOSIS — F33.41 RECURRENT MAJOR DEPRESSIVE DISORDER, IN PARTIAL REMISSION (HCC): Chronic | ICD-10-CM

## 2019-02-21 DIAGNOSIS — G89.29 OTHER CHRONIC PAIN: ICD-10-CM

## 2019-02-21 DIAGNOSIS — G89.29 CHRONIC BILATERAL LOW BACK PAIN WITHOUT SCIATICA: ICD-10-CM

## 2019-02-21 DIAGNOSIS — M79.642 LEFT HAND PAIN: Primary | Chronic | ICD-10-CM

## 2019-02-21 DIAGNOSIS — M54.16 LUMBAR RADICULOPATHY: ICD-10-CM

## 2019-02-21 DIAGNOSIS — M54.50 CHRONIC BILATERAL LOW BACK PAIN WITHOUT SCIATICA: ICD-10-CM

## 2019-02-21 PROCEDURE — 99213 OFFICE O/P EST LOW 20 MIN: CPT | Performed by: FAMILY MEDICINE

## 2019-02-21 RX ORDER — ATORVASTATIN CALCIUM 20 MG/1
20 TABLET, FILM COATED ORAL DAILY
Qty: 30 TABLET | Refills: 5 | Status: SHIPPED | OUTPATIENT
Start: 2019-02-21 | End: 2019-08-24 | Stop reason: SDUPTHER

## 2019-02-21 RX ORDER — DULOXETIN HYDROCHLORIDE 60 MG/1
60 CAPSULE, DELAYED RELEASE ORAL DAILY
Qty: 30 CAPSULE | Refills: 5 | Status: SHIPPED | OUTPATIENT
Start: 2019-02-21 | End: 2019-07-16 | Stop reason: SDUPTHER

## 2019-02-21 RX ORDER — FENTANYL 50 UG/H
1 PATCH TRANSDERMAL
Qty: 10 PATCH | Refills: 0 | Status: SHIPPED | OUTPATIENT
Start: 2019-02-21 | End: 2019-03-25 | Stop reason: SDUPTHER

## 2019-02-21 RX ORDER — FENTANYL 100 UG/H
1 PATCH TRANSDERMAL
Qty: 10 PATCH | Refills: 0 | Status: SHIPPED | OUTPATIENT
Start: 2019-02-21 | End: 2019-03-25 | Stop reason: SDUPTHER

## 2019-02-21 NOTE — PROGRESS NOTES
Assessment/Plan:    No problem-specific Assessment & Plan notes found for this encounter  Diagnoses and all orders for this visit:    Left hand pain  Comments:  Suspect OA here  Xrays ordered  Pt to try warm Epsom Salts soaks, working with will or Playdoh at home, etc   Orders:  -     XR hand 3+ vw left; Future    Recurrent major depressive disorder, in partial remission ([facility] [address] )  Comments:  Stable on Duloxetine  Is helping some with chronic pain as well  Orders:  -     DULoxetine (CYMBALTA) 60 mg delayed release capsule; Take 1 capsule (60 mg total) by mouth daily    Chronic pain syndrome  Comments:  Stable with Fentanyl patches - refilled today; [place]MP [place]  Sees Neurosurgery soon  Orders:  -     DULoxetine (CYMBALTA) 60 mg delayed release capsule; Take 1 capsule (60 mg total) by mouth daily    Chronic bilateral low back pain without sciatica    Other chronic pain  -     fentaNYL (DURAGESIC) 100 mcg/hr TD 72 hr patch; Place 1 patch on the skin every third dayMax Daily Amount: 1 patch  -     fentaNYL (DURAGESIC) 50 mcg/hr; Place 1 patch on the skin every third dayMax Daily Amount: 1 patch    Lumbar radiculopathy    Other hyperlipidemia  Comments:  Stable on Atorvastatin  Lab slips reprinted for the pt  Orders:  -     atorvastatin (LIPITOR) 20 mg tablet; Take 1 tablet (20 mg total) by mouth daily          Subjective:      Patient ID: Jackie Christine is a 46 y o  male  2+ months, pain at the ring finger and swelling, of the left hand - Did not jam it, bang, etc     Sees Neurosurgery for his back tomorrow  Possible repeat back surgery v Neurostimulator  [place]MP was [place]  Duloxetine is still helping          The following portions of the patient's history were reviewed and updated as appropriate: allergies, current medications, past family history, past social history, past surgical history and problem list     Past Medical History:   Diagnosis Date    Anxiety and depression     Chronic back pain     Hypercholesterolemia     last assessed 7/19/17    Hyperlipidemia     Mastitis     Sinusitis      Past Surgical History:   Procedure Laterality Date    BACK SURGERY      back sx for fx + disc rupture    BACK SURGERY      lower    CT COLONOSCOPY FLX DX W/COLLJ SPEC WHEN PFRMD N/A 8/13/2018    Procedure: COLONOSCOPY;  Surgeon: William Steward MD;  [facility]; Service: Gastroenterology         Current Outpatient Medications:     atorvastatin (LIPITOR) 20 mg tablet, Take 1 tablet (20 mg total) by mouth daily, Disp: 30 tablet, Rfl: 5    DULoxetine (CYMBALTA) 60 mg delayed release capsule, Take 1 capsule (60 mg total) by mouth daily, Disp: 30 capsule, Rfl: 5    fentaNYL (DURAGESIC) 100 mcg/hr TD 72 hr patch, Place 1 patch on the skin every third dayMax Daily Amount: 1 patch, Disp: 10 patch, Rfl: 0    fentaNYL (DURAGESIC) 50 mcg/hr, Place 1 patch on the skin every third dayMax Daily Amount: 1 patch, Disp: 10 patch, Rfl: 0    gabapentin (NEURONTIN) 400 mg capsule, Take 1 capsule (400 mg total) by mouth 3 (three) times a day, Disp: 90 capsule, Rfl: 2    naproxen (NAPROSYN) 500 mg tablet, Take 1 tablet by mouth every 12 (twelve) hours as needed, Disp: , Rfl:     No Known Allergies    Social History     Tobacco Use    Smoking status: Current Every Day Smoker     Packs/day: 0 25     Types: Cigarettes    Smokeless tobacco: Never Used    Tobacco comment: declined; former smoker as per [employer]   Substance Use Topics    Alcohol use: No    Drug use: No       Review of Systems   Constitutional: Negative for activity change  Respiratory: Negative for shortness of breath  Cardiovascular: Negative for chest pain  Musculoskeletal: Positive for arthralgias and back pain  Negative for myalgias  Psychiatric/Behavioral: Negative for dysphoric mood and suicidal ideas           Objective:      /70 (BP Location: Left arm, Patient Position: Sitting, Cuff Size: Standard)   Pulse 60 Resp 12   Ht 5' 8" (1 727 m)   Wt 79 6 kg (175 lb 6 4 oz)   SpO2 97%   BMI 26 67 kg/m²          Physical Exam   Constitutional: He is oriented to person, place, and time  He appears well-developed and well-nourished  No distress  HENT:   Head: Normocephalic and atraumatic  Neck: Normal range of motion  Neck supple  No thyromegaly present  Cardiovascular: Normal rate, regular rhythm and normal heart sounds  Exam reveals no gallop and no friction rub  No murmur heard  Pulmonary/Chest: Effort normal and breath sounds normal  No stridor  No respiratory distress  He has no wheezes  He has no rales  Musculoskeletal:        Hands:  Lymphadenopathy:     He has no cervical adenopathy  Neurological: He is alert and oriented to person, place, and time  Skin: He is not diaphoretic  Psychiatric: He has a normal mood and affect  His behavior is normal  Judgment and thought content normal    Nursing note and vitals reviewed

## 2019-02-24 DIAGNOSIS — M54.16 LUMBAR RADICULOPATHY: ICD-10-CM

## 2019-02-24 RX ORDER — GABAPENTIN 400 MG/1
400 CAPSULE ORAL 3 TIMES DAILY
Qty: 90 CAPSULE | Refills: 2 | OUTPATIENT
Start: 2019-02-24

## 2019-02-25 ENCOUNTER — CONSULT (OUTPATIENT)
Dept: NEUROSURGERY | Facility: CLINIC | Age: 54
End: 2019-02-25
Payer: MEDICARE

## 2019-02-25 VITALS
BODY MASS INDEX: 26.52 KG/M2 | HEIGHT: 68 IN | RESPIRATION RATE: 16 BRPM | DIASTOLIC BLOOD PRESSURE: 73 MMHG | WEIGHT: 175 LBS | SYSTOLIC BLOOD PRESSURE: 136 MMHG | TEMPERATURE: 97.9 F | HEART RATE: 65 BPM

## 2019-02-25 DIAGNOSIS — M96.1 LUMBAR POST-LAMINECTOMY SYNDROME: ICD-10-CM

## 2019-02-25 DIAGNOSIS — G89.4 CHRONIC PAIN SYNDROME: Primary | ICD-10-CM

## 2019-02-25 DIAGNOSIS — M54.16 LUMBAR RADICULOPATHY: ICD-10-CM

## 2019-02-25 DIAGNOSIS — M48.061 LUMBAR FORAMINAL STENOSIS: ICD-10-CM

## 2019-02-25 PROCEDURE — 99203 OFFICE O/P NEW LOW 30 MIN: CPT | Performed by: NEUROLOGICAL SURGERY

## 2019-02-25 NOTE — PROGRESS NOTES
Assessment/Plan:    No problem-specific Assessment & Plan notes found for this encounter  Patient with low back pain and right lower extremity pain  MRI shows good L5-S1 fusion, he likely has persistent epidural scar etc which would not be amenable to surgery  Recommend thoracic SCS trial     Diagnoses and all orders for this visit:    Chronic pain syndrome    Lumbar radiculopathy  -     Ambulatory referral to Neurosurgery    Lumbar post-laminectomy syndrome  -     Ambulatory referral to Neurosurgery    Lumbar foraminal stenosis  -     Ambulatory referral to Neurosurgery          Subjective:      Patient ID: Nestor Garcia is a 46 y o  male  Patient with a prior PLIF at L5-S1 and post laminectomy syndrome with persistent right lower extremity pain he reports that he did not obtain any long term relief from his surgery  His pain is persistent and reportedly had noted changes on EMG  He is a patient with Dr Escamilla sent for a stimulation versus surgery consultation  The following portions of the patient's history were reviewed and updated as appropriate:   He  has a past medical history of Anxiety and depression, Chronic back pain, Hypercholesterolemia, Hyperlipidemia, Mastitis, and Sinusitis  He   Patient Active Problem List    Diagnosis Date Noted    [facility]    Lumbar post-laminectomy syndrome 11/30/2018    Lumbar foraminal stenosis 11/30/2018    Lumbar spondylosis 11/30/2018    Chronic pain syndrome 11/30/2018    Lumbar radiculopathy     Encounter for screening colonoscopy 08/06/2018    Screening for colon cancer 07/19/2018    Chest pain in adult 10/28/2017    Other hyperlipidemia 10/28/2017    Chronic low back pain 10/28/2017    Diaphoresis 10/28/2017     He  has a past surgical history that includes Back surgery; Back surgery; and pr colonoscopy flx dx w/collj spec when pfrmd (N/A, 8/13/2018)    His family history includes Diabetes in his mother; Hypertension in his family; Stroke in his father  He  reports that he has been smoking cigarettes  He has been smoking about 0 25 packs per day  He has never used smokeless tobacco  He reports that he does not drink alcohol or use drugs  Current Outpatient Medications on File Prior to Visit   Medication Sig    atorvastatin (LIPITOR) 20 mg tablet Take 1 tablet (20 mg total) by mouth daily    DULoxetine (CYMBALTA) 60 mg delayed release capsule Take 1 capsule (60 mg total) by mouth daily    fentaNYL (DURAGESIC) 100 mcg/hr TD 72 hr patch Place 1 patch on the skin every third dayMax Daily Amount: 1 patch    fentaNYL (DURAGESIC) 50 mcg/hr Place 1 patch on the skin every third dayMax Daily Amount: 1 patch    gabapentin (NEURONTIN) 400 mg capsule Take 1 capsule (400 mg total) by mouth 3 (three) times a day    naproxen (NAPROSYN) 500 mg tablet Take 1 tablet by mouth every 12 (twelve) hours as needed     No current facility-administered medications on file prior to visit       Review of Systems   Constitutional: Positive for fatigue  HENT: Negative  Eyes: Negative  Respiratory: Negative  Cardiovascular: Negative  Gastrointestinal: Negative  Endocrine: Negative  Genitourinary: Negative  Musculoskeletal: Positive for back pain (center to right Lower back pain radiating down back of right leg into toes)  Skin: Negative  Allergic/Immunologic: Negative  Neurological: Positive for weakness (right leg) and numbness (back of right leg )  Hematological: Negative  Psychiatric/Behavioral: Negative  Objective:      /73 (BP Location: Left arm)   Pulse 65   Temp 97 9 °F (36 6 °C) (Tympanic)   Resp 16   Ht 5' 8" (1 727 m)   Wt 79 4 kg (175 lb)   BMI 26 61 kg/m²          Physical Exam   Constitutional: He is oriented to person, place, and time  He appears well-developed  HENT:   Head: Normocephalic     Eyes: Pupils are equal, round, and reactive to light  EOM are normal    Cardiovascular: Normal rate  Pulmonary/Chest: Effort normal    Neurological: He is alert and oriented to person, place, and time  He has normal strength  No cranial nerve deficit or sensory deficit

## 2019-02-25 NOTE — TELEPHONE ENCOUNTER
S/w the patient and he stated he does need a refill on the gabapentin 400mg TISD  Looks like last prescribed on 11/30/18 c/ 2 refills  Please order   Thanks

## 2019-02-25 NOTE — TELEPHONE ENCOUNTER
Patient  [phone]  Dr Shandra Trimble    Patient is returning a call back that was placed out to him please give him a call back thank you

## 2019-02-26 RX ORDER — GABAPENTIN 400 MG/1
400 CAPSULE ORAL 3 TIMES DAILY
Qty: 90 CAPSULE | Refills: 1 | Status: SHIPPED | OUTPATIENT
Start: 2019-02-26

## 2019-03-01 ENCOUNTER — OFFICE VISIT (OUTPATIENT)
Dept: PAIN MEDICINE | Facility: MEDICAL CENTER | Age: 54
End: 2019-03-01
Payer: MEDICARE

## 2019-03-01 VITALS
SYSTOLIC BLOOD PRESSURE: 124 MMHG | DIASTOLIC BLOOD PRESSURE: 80 MMHG | HEIGHT: 68 IN | BODY MASS INDEX: 26.52 KG/M2 | WEIGHT: 175 LBS

## 2019-03-01 DIAGNOSIS — G89.4 CHRONIC PAIN SYNDROME: ICD-10-CM

## 2019-03-01 DIAGNOSIS — M54.50 CHRONIC BILATERAL LOW BACK PAIN WITHOUT SCIATICA: ICD-10-CM

## 2019-03-01 DIAGNOSIS — M48.061 LUMBAR FORAMINAL STENOSIS: ICD-10-CM

## 2019-03-01 DIAGNOSIS — M47.816 LUMBAR SPONDYLOSIS: ICD-10-CM

## 2019-03-01 DIAGNOSIS — G89.29 CHRONIC BILATERAL LOW BACK PAIN WITHOUT SCIATICA: ICD-10-CM

## 2019-03-01 DIAGNOSIS — M96.1 LUMBAR POST-LAMINECTOMY SYNDROME: ICD-10-CM

## 2019-03-01 DIAGNOSIS — M54.16 LUMBAR RADICULOPATHY: Primary | ICD-10-CM

## 2019-03-01 PROCEDURE — 99214 OFFICE O/P EST MOD 30 MIN: CPT | Performed by: NURSE PRACTITIONER

## 2019-03-01 NOTE — PROGRESS NOTES
Assessment:  1  Lumbar radiculopathy    2  Lumbar spondylosis    3  Lumbar foraminal stenosis    4  Chronic bilateral low back pain without sciatica    5  Lumbar post-laminectomy syndrome    6  Chronic pain syndrome        Plan:  1  The patient is interested in a spinal cord stimulator trial as recommended by Dr Joyce De Leon for his low back and radiating right lower extremity pain  I had a thorough conversation with the patient regarding a spinal cord stimulator trial and possible permanent implantation  I explained that there is a pre-authorization process, including an education class, MRI of the thoracic spine to rule out stenosis that would prevent lead passage and psychological evaluation that must be completed before we can consider proceeding with the spinal cord stimulator trial  While the patient was in the office today, the [facility]  [employer] medical release form was completed and signed by the patient  The patient was scheduled for the education class and given prescriptions for a psychological evaluation/clearance for the procedure  I encouraged the patient to go to the class and complete the psychological evaluation as soon as possible, as these are the key elements in proceeding with the pre-authorization of the spinal cord stimulation trial procedure  I also had a thorough conversation with the patient and explained that overall the expectations of the spinal cord stimulator are not to cure the pain, but rather to at least provide moderate/stable relief, allowing the patient to be more active and functional on a daily basis  I stressed that the goal of the stimulator and our treatment is NOT to provide 100% relief of their pain, but, hopefully at least 50% relief or more   I also explained that if they have a successful trial and eventually proceed with a permanent implant, that utilizing the full potential of the stimulator can require continuous re-programming and education and I explained that the stimulator is/will be an ongoing and evolving learning process for the patient  The patient was agreeable and verbalized an understanding  2  He does not need a refill of gabapentin 400mg TID at today's office visit  3   The patient may continue duloxetine 60mg daily  4  It is recommended the patient continue to try and wean down on opioid medication to 90 oral morphine equivalents daily or less as the patient is currently on about 300 milligrams oral morphine equivalents daily  He is likely hyperalgesic and tolerant to this medication as he states he is experiencing no relief with this current medication regimen  5   The patient will continue with his home exercise program  6  The patient will follow up in 3 months or sooner if needed  [place] Prescription Drug Monitoring Program report was reviewed and was appropriate      I attest that I have spent at least 25 minutes face to face with the patient and that at least 50% of the time was spent educating and/or discussing the patient's symptoms and treatment plan options  History of Present Illness: The patient is a 46 y o  male with a history of previous L5-S1 fusion last seen on 11/30/18 who presents for a follow up office visit in regards to chronic lumbosacral back pain with radiculopathy in the S1 distribution of the right lower extremity secondary to lumbar post-laminectomy syndrome, lumbar degenerative disc disease, lumbar spondylosis and stenosis  The patient denies bowel or bladder incontinence or saddle anesthesia  The patient was evaluated by Dr Shadi Bob of neurosurgery on 2/25/19 who did recommend a thoracic spinal cord stimulator trial as it was felt that the patient's symptoms is likely due to persistent epidural scar tissue which would not be amenable to surgery  The patient is interested in pursuing a spinal cord stimulator trial at this point  He has undergone previous epidural steroid injections in the past without relief  The patient is currently managing on high-dose opioid medications including fentanyl patch 150mcg Q72 hours which is managed by his PCP, as well as duloxetine 60 milligrams daily, naproxen 500 milligrams q 12 hours p r n  And gabapentin 400 milligrams t i d  And states this medication regimen is providing 0% relief of his pain  The patient currently rates his pain an 8/10 on the numeric pain rating scale  He states his pain is constant in nature most bothersome in the morning  He characterizes the pain as burning, sharp and pins and needles  I have personally reviewed and/or updated the patient's past medical history, past surgical history, family history, social history, current medications, allergies, and vital signs today  Review of Systems:    Review of Systems   Respiratory: Negative for shortness of breath  Cardiovascular: Negative for chest pain  Gastrointestinal: Positive for constipation  Negative for diarrhea, nausea and vomiting  Musculoskeletal: Positive for gait problem (Difficulty walking, Decreased range of motion)  Negative for arthralgias, joint swelling and myalgias  Joint stiffness   Skin: Negative for rash  Neurological: Negative for dizziness, seizures and weakness  All other systems reviewed and are negative  Past Medical History:   Diagnosis Date    Anxiety and depression     Chronic back pain     Hypercholesterolemia     last assessed 7/19/17    Hyperlipidemia     Mastitis     Sinusitis        Past Surgical History:   Procedure Laterality Date    BACK SURGERY      back sx for fx + disc rupture    BACK SURGERY      lower    [place] COLONOSCOPY FLX DX W/COLLJ SPEC WHEN PFRMD N/A 8/13/2018    Procedure: COLONOSCOPY;  Surgeon: Golden Lewis MD;  [facility];   Service: Gastroenterology       Family History   Problem Relation Age of Onset    Diabetes Mother         mellitus    Stroke Father         syndrome    Hypertension Family Social History     Occupational History    Not on file   Tobacco Use    Smoking status: Current Every Day Smoker     Packs/day: 0 25     Types: Cigarettes    Smokeless tobacco: Never Used    Tobacco comment: declined; former smoker as per [employer]   Substance and Sexual Activity    Alcohol use: No    Drug use: No    Sexual activity: Not on file         Current Outpatient Medications:     atorvastatin (LIPITOR) 20 mg tablet, Take 1 tablet (20 mg total) by mouth daily, Disp: 30 tablet, Rfl: 5    DULoxetine (CYMBALTA) 60 mg delayed release capsule, Take 1 capsule (60 mg total) by mouth daily, Disp: 30 capsule, Rfl: 5    fentaNYL (DURAGESIC) 100 mcg/hr TD 72 hr patch, Place 1 patch on the skin every third dayMax Daily Amount: 1 patch, Disp: 10 patch, Rfl: 0    fentaNYL (DURAGESIC) 50 mcg/hr, Place 1 patch on the skin every third dayMax Daily Amount: 1 patch, Disp: 10 patch, Rfl: 0    gabapentin (NEURONTIN) 400 mg capsule, Take 1 capsule (400 mg total) by mouth 3 (three) times a day, Disp: 90 capsule, Rfl: 1    naproxen (NAPROSYN) 500 mg tablet, Take 1 tablet by mouth every 12 (twelve) hours as needed, Disp: , Rfl:     No Known Allergies    Physical Exam:    /80   Ht 5' 8" (1 727 m)   Wt 79 4 kg (175 lb)   BMI 26 61 kg/m²     Constitutional:normal, well developed, well nourished, alert, in no distress and non-toxic and no overt pain behavior    Eyes:anicteric  HEENT:grossly intact  Neck:supple, symmetric, trachea midline and no masses   Pulmonary:even and unlabored  Cardiovascular:No edema or pitting edema present  Skin:Normal without rashes or lesions and well hydrated  Psychiatric:Mood and affect appropriate  Neurologic:Cranial Nerves II-XII grossly intact  Musculoskeletal:antalgic gait, but steady without the use of assistive devices      Imaging  MRI thoracic spine without contrast    (Results Pending)         Orders Placed This Encounter   Procedures    MRI thoracic spine without contrast    Ambulatory referral to Psychology

## 2019-03-04 ENCOUNTER — TELEPHONE (OUTPATIENT)
Dept: PAIN MEDICINE | Facility: CLINIC | Age: 54
End: 2019-03-04

## 2019-03-05 ENCOUNTER — HOSPITAL ENCOUNTER (OUTPATIENT)
Dept: RADIOLOGY | Facility: HOSPITAL | Age: 54
Discharge: HOME | End: 2019-03-05
Payer: MEDICARE

## 2019-03-05 ENCOUNTER — LAB (OUTPATIENT)
Dept: LAB | Facility: HOSPITAL | Age: 54
End: 2019-03-05
Payer: MEDICARE

## 2019-03-05 DIAGNOSIS — M79.642 LEFT HAND PAIN: ICD-10-CM

## 2019-03-05 DIAGNOSIS — E78.49 OTHER HYPERLIPIDEMIA: Chronic | ICD-10-CM

## 2019-03-05 LAB
ALBUMIN SERPL BCP-MCNC: 4.2 G/DL (ref 3–5.2)
ALP SERPL-CCNC: 54 U/L (ref 43–122)
ALT SERPL W P-5'-P-CCNC: 36 U/L (ref 9–52)
ANION GAP SERPL CALCULATED.3IONS-SCNC: 7 MMOL/L (ref 5–14)
AST SERPL W P-5'-P-CCNC: 34 U/L (ref 17–59)
BILIRUB SERPL-MCNC: 0.5 MG/DL
BUN SERPL-MCNC: 21 MG/DL (ref 5–25)
CALCIUM SERPL-MCNC: 9.5 MG/DL (ref 8.4–10.2)
CHLORIDE SERPL-SCNC: 101 MMOL/L (ref 97–108)
CHOLEST SERPL-MCNC: 178 MG/DL
CO2 SERPL-SCNC: 30 MMOL/L (ref 22–30)
CREAT SERPL-MCNC: 0.96 MG/DL (ref 0.7–1.5)
GFR SERPL CREATININE-BSD FRML MDRD: 89 ML/MIN/1.73SQ M
GLUCOSE P FAST SERPL-MCNC: 81 MG/DL (ref 70–99)
HDLC SERPL-MCNC: 35 MG/DL (ref 40–59)
LDLC SERPL CALC-MCNC: 124 MG/DL
POTASSIUM SERPL-SCNC: 4.4 MMOL/L (ref 3.6–5)
PROT SERPL-MCNC: 6.8 G/DL (ref 5.9–8.4)
PSA SERPL-MCNC: 0.4 NG/ML (ref 0–4)
SODIUM SERPL-SCNC: 138 MMOL/L (ref 137–147)
TRIGL SERPL-MCNC: 97 MG/DL

## 2019-03-05 PROCEDURE — 80061 LIPID PANEL: CPT

## 2019-03-05 PROCEDURE — 80053 COMPREHEN METABOLIC PANEL: CPT

## 2019-03-05 PROCEDURE — 36415 COLL VENOUS BLD VENIPUNCTURE: CPT

## 2019-03-05 PROCEDURE — 73130 X-RAY EXAM OF HAND: CPT

## 2019-03-05 PROCEDURE — G0103 PSA SCREENING: HCPCS | Performed by: FAMILY MEDICINE

## 2019-03-06 NOTE — RESULT ENCOUNTER NOTE
OK to mail  Please let the patient know that their bloodwork was normal; and PSA too at 0 4  Thanks     Dr Maliha Welch

## 2019-03-09 DIAGNOSIS — M25.50 ARTHRALGIA, UNSPECIFIED JOINT: Primary | ICD-10-CM

## 2019-03-11 ENCOUNTER — HOSPITAL ENCOUNTER (OUTPATIENT)
Dept: MRI IMAGING | Facility: HOSPITAL | Age: 54
Discharge: HOME | End: 2019-03-11
Payer: MEDICARE

## 2019-03-11 DIAGNOSIS — M47.816 LUMBAR SPONDYLOSIS: ICD-10-CM

## 2019-03-11 DIAGNOSIS — G89.29 CHRONIC BILATERAL LOW BACK PAIN WITHOUT SCIATICA: ICD-10-CM

## 2019-03-11 DIAGNOSIS — G89.4 CHRONIC PAIN SYNDROME: ICD-10-CM

## 2019-03-11 DIAGNOSIS — M54.16 LUMBAR RADICULOPATHY: ICD-10-CM

## 2019-03-11 DIAGNOSIS — M48.061 LUMBAR FORAMINAL STENOSIS: ICD-10-CM

## 2019-03-11 DIAGNOSIS — M96.1 LUMBAR POST-LAMINECTOMY SYNDROME: ICD-10-CM

## 2019-03-11 DIAGNOSIS — M54.50 CHRONIC BILATERAL LOW BACK PAIN WITHOUT SCIATICA: ICD-10-CM

## 2019-03-11 PROCEDURE — 72146 MRI CHEST SPINE W/O DYE: CPT

## 2019-03-12 ENCOUNTER — TELEPHONE (OUTPATIENT)
Dept: PAIN MEDICINE | Facility: CLINIC | Age: 54
End: 2019-03-12

## 2019-03-12 DIAGNOSIS — R93.7 ABNORMAL MRI, THORACIC SPINE: Primary | ICD-10-CM

## 2019-03-12 NOTE — TELEPHONE ENCOUNTER
Please call the patient and let him know that there was an abnormality seen of the thoracic cord on MRI  The radiologist did recommend further investigation by obtaining an MRI with and without contrast  We will call the patient with the results  The referral for the MRI can either be mailed to the patient or picked up at the office  The patient should not need blood work to evaluate kidney function since he just had a CMP 3/5/19 that was WNL

## 2019-03-25 DIAGNOSIS — G89.29 OTHER CHRONIC PAIN: ICD-10-CM

## 2019-03-25 RX ORDER — FENTANYL 50 UG/H
1 PATCH TRANSDERMAL
Qty: 10 PATCH | Refills: 0 | Status: SHIPPED | OUTPATIENT
Start: 2019-03-25 | End: 2019-04-25 | Stop reason: SDUPTHER

## 2019-03-25 RX ORDER — FENTANYL 100 UG/H
1 PATCH TRANSDERMAL
Qty: 10 PATCH | Refills: 0 | Status: SHIPPED | OUTPATIENT
Start: 2019-03-25 | End: 2019-04-25 | Stop reason: SDUPTHER

## 2019-03-25 NOTE — TELEPHONE ENCOUNTER
Refill,         fentaNYL (DURAGESIC) 100 mcg/hr TD 72 hr patch Sig: Place 1 patch on the skin every third dayMax Daily Amount: 1 patch          fentaNYL (DURAGESIC) 50 mcg/hr Sig: Place 1 patch on the skin every third dayMax Daily Amount: 1 patch        Pharmacy is [facility]

## 2019-03-28 ENCOUNTER — HOSPITAL ENCOUNTER (OUTPATIENT)
Dept: MRI IMAGING | Facility: HOSPITAL | Age: 54
Discharge: HOME | End: 2019-03-28
Payer: MEDICARE

## 2019-03-28 DIAGNOSIS — R93.7 ABNORMAL MRI, THORACIC SPINE: ICD-10-CM

## 2019-03-28 PROCEDURE — 72157 MRI CHEST SPINE W/O & W/DYE: CPT

## 2019-03-28 PROCEDURE — A9585 GADOBUTROL INJECTION: HCPCS | Performed by: NURSE PRACTITIONER

## 2019-03-28 RX ADMIN — GADOBUTROL 8 ML: 604.72 INJECTION INTRAVENOUS at 13:31

## 2019-04-25 DIAGNOSIS — G89.29 OTHER CHRONIC PAIN: ICD-10-CM

## 2019-04-25 RX ORDER — FENTANYL 50 UG/H
1 PATCH TRANSDERMAL
Qty: 10 PATCH | Refills: 0 | Status: SHIPPED | OUTPATIENT
Start: 2019-04-25 | End: 2019-05-30 | Stop reason: SDUPTHER

## 2019-04-25 RX ORDER — FENTANYL 100 UG/H
1 PATCH TRANSDERMAL
Qty: 10 PATCH | Refills: 0 | Status: SHIPPED | OUTPATIENT
Start: 2019-04-25 | End: 2019-05-30 | Stop reason: SDUPTHER

## 2019-04-29 ENCOUNTER — TELEPHONE (OUTPATIENT)
Dept: PAIN MEDICINE | Facility: CLINIC | Age: 54
End: 2019-04-29

## 2019-05-06 DIAGNOSIS — M54.16 LUMBAR RADICULOPATHY: ICD-10-CM

## 2019-05-06 DIAGNOSIS — Z79.01 CHRONIC ANTICOAGULATION: ICD-10-CM

## 2019-05-06 DIAGNOSIS — M96.1 POSTLAMINECTOMY SYNDROME, LUMBAR REGION: ICD-10-CM

## 2019-05-06 DIAGNOSIS — G89.4 CHRONIC PAIN SYNDROME: Primary | ICD-10-CM

## 2019-05-06 DIAGNOSIS — Z01.818 PREOP EXAMINATION: ICD-10-CM

## 2019-05-06 RX ORDER — CEFAZOLIN SODIUM 2 G/50ML
2000 SOLUTION INTRAVENOUS ONCE
Status: CANCELLED | OUTPATIENT
Start: 2019-05-06 | End: 2019-05-06

## 2019-05-30 DIAGNOSIS — G89.29 OTHER CHRONIC PAIN: ICD-10-CM

## 2019-05-30 RX ORDER — FENTANYL 100 UG/H
1 PATCH TRANSDERMAL
Qty: 10 PATCH | Refills: 0 | Status: SHIPPED | OUTPATIENT
Start: 2019-05-30 | End: 2019-07-16 | Stop reason: SDUPTHER

## 2019-05-30 RX ORDER — FENTANYL 50 UG/H
1 PATCH TRANSDERMAL
Qty: 10 PATCH | Refills: 0 | Status: SHIPPED | OUTPATIENT
Start: 2019-05-30 | End: 2019-07-16 | Stop reason: SDUPTHER

## 2019-06-07 ENCOUNTER — CLINICAL SUPPORT (OUTPATIENT)
Dept: PAIN MEDICINE | Facility: CLINIC | Age: 54
End: 2019-06-07
Payer: MEDICARE

## 2019-06-07 VITALS
BODY MASS INDEX: 26.98 KG/M2 | WEIGHT: 178 LBS | DIASTOLIC BLOOD PRESSURE: 77 MMHG | HEART RATE: 54 BPM | TEMPERATURE: 98.7 F | HEIGHT: 68 IN | SYSTOLIC BLOOD PRESSURE: 126 MMHG

## 2019-06-07 DIAGNOSIS — M54.16 LUMBAR RADICULOPATHY: ICD-10-CM

## 2019-06-07 DIAGNOSIS — M96.1 LUMBAR POST-LAMINECTOMY SYNDROME: Primary | ICD-10-CM

## 2019-06-07 DIAGNOSIS — G89.4 CHRONIC PAIN SYNDROME: ICD-10-CM

## 2019-06-07 PROCEDURE — 99213 OFFICE O/P EST LOW 20 MIN: CPT | Performed by: ANESTHESIOLOGY

## 2019-06-08 ENCOUNTER — APPOINTMENT (OUTPATIENT)
Dept: LAB | Facility: HOSPITAL | Age: 54
End: 2019-06-08
Payer: MEDICARE

## 2019-06-08 DIAGNOSIS — Z79.01 CHRONIC ANTICOAGULATION: ICD-10-CM

## 2019-06-08 DIAGNOSIS — M54.16 LUMBAR RADICULOPATHY: ICD-10-CM

## 2019-06-08 DIAGNOSIS — Z01.818 PREOP EXAMINATION: ICD-10-CM

## 2019-06-08 DIAGNOSIS — G89.4 CHRONIC PAIN SYNDROME: ICD-10-CM

## 2019-06-08 DIAGNOSIS — M96.1 POSTLAMINECTOMY SYNDROME, LUMBAR REGION: ICD-10-CM

## 2019-06-08 DIAGNOSIS — M25.50 ARTHRALGIA, UNSPECIFIED JOINT: ICD-10-CM

## 2019-06-08 LAB
ALBUMIN SERPL BCP-MCNC: 4.2 G/DL (ref 3–5.2)
ALP SERPL-CCNC: 58 U/L (ref 43–122)
ALT SERPL W P-5'-P-CCNC: 23 U/L (ref 9–52)
ANION GAP SERPL CALCULATED.3IONS-SCNC: 10 MMOL/L (ref 5–14)
APTT PPP: 29 SECONDS (ref 23–34)
AST SERPL W P-5'-P-CCNC: 23 U/L (ref 17–59)
BASOPHILS # BLD AUTO: 0.08 THOUSAND/UL (ref 0–0.1)
BASOPHILS NFR MAR MANUAL: 1 % (ref 0–1)
BILIRUB SERPL-MCNC: 0.6 MG/DL
BUN SERPL-MCNC: 15 MG/DL (ref 5–25)
CALCIUM SERPL-MCNC: 9.1 MG/DL (ref 8.4–10.2)
CHLORIDE SERPL-SCNC: 102 MMOL/L (ref 97–108)
CO2 SERPL-SCNC: 27 MMOL/L (ref 22–30)
CREAT SERPL-MCNC: 0.8 MG/DL (ref 0.7–1.5)
CRP SERPL QL: <3 MG/L
EOSINOPHIL # BLD AUTO: 0.96 THOUSAND/UL (ref 0–0.4)
EOSINOPHIL NFR BLD MANUAL: 12 % (ref 0–6)
ERYTHROCYTE [DISTWIDTH] IN BLOOD BY AUTOMATED COUNT: 13.7 %
ERYTHROCYTE [SEDIMENTATION RATE] IN BLOOD: 15 MM/HOUR (ref 1–20)
EST. AVERAGE GLUCOSE BLD GHB EST-MCNC: 108 MG/DL
GFR SERPL CREATININE-BSD FRML MDRD: 101 ML/MIN/1.73SQ M
GLUCOSE P FAST SERPL-MCNC: 103 MG/DL (ref 70–99)
HBA1C MFR BLD: 5.4 % (ref 4.2–6.3)
HCT VFR BLD AUTO: 47.2 % (ref 41–53)
HGB BLD-MCNC: 16.1 G/DL (ref 13.5–17.5)
INR PPP: 1.01 (ref 0.89–1.1)
LYMPHOCYTES # BLD AUTO: 3.04 THOUSAND/UL (ref 0.5–4)
LYMPHOCYTES # BLD AUTO: 38 % (ref 25–45)
MCH RBC QN AUTO: 30.2 PG (ref 26–34)
MCHC RBC AUTO-ENTMCNC: 34.1 G/DL (ref 31–36)
MCV RBC AUTO: 89 FL (ref 80–100)
MONOCYTES # BLD AUTO: 0.48 THOUSAND/UL (ref 0.2–0.9)
MONOCYTES NFR BLD AUTO: 6 % (ref 1–10)
NEUTS SEG # BLD: 3.28 THOUSAND/UL (ref 1.8–7.8)
NEUTS SEG NFR BLD AUTO: 41 %
PLATELET # BLD AUTO: 192 THOUSANDS/UL (ref 150–450)
PLATELET BLD QL SMEAR: ADEQUATE
PMV BLD AUTO: 9.1 FL (ref 8.9–12.7)
POTASSIUM SERPL-SCNC: 3.7 MMOL/L (ref 3.6–5)
PROT SERPL-MCNC: 7.2 G/DL (ref 5.9–8.4)
PROTHROMBIN TIME: 10.7 SECONDS (ref 9.5–11.6)
RBC # BLD AUTO: 5.33 MILLION/UL (ref 4.5–5.9)
RBC MORPH BLD: NORMAL
SODIUM SERPL-SCNC: 139 MMOL/L (ref 137–147)
TOTAL CELLS COUNTED SPEC: 100
URATE SERPL-MCNC: 4.9 MG/DL (ref 3.5–8.5)
VARIANT LYMPHS # BLD AUTO: 2 % (ref 0–0)
WBC # BLD AUTO: 8 THOUSAND/UL (ref 4.5–11)

## 2019-06-08 PROCEDURE — 86430 RHEUMATOID FACTOR TEST QUAL: CPT

## 2019-06-08 PROCEDURE — 86431 RHEUMATOID FACTOR QUANT: CPT

## 2019-06-08 PROCEDURE — 83036 HEMOGLOBIN GLYCOSYLATED A1C: CPT

## 2019-06-08 PROCEDURE — 85610 PROTHROMBIN TIME: CPT

## 2019-06-08 PROCEDURE — 85007 BL SMEAR W/DIFF WBC COUNT: CPT

## 2019-06-08 PROCEDURE — 86038 ANTINUCLEAR ANTIBODIES: CPT

## 2019-06-08 PROCEDURE — 86140 C-REACTIVE PROTEIN: CPT

## 2019-06-08 PROCEDURE — 86618 LYME DISEASE ANTIBODY: CPT

## 2019-06-08 PROCEDURE — 84550 ASSAY OF BLOOD/URIC ACID: CPT

## 2019-06-08 PROCEDURE — 85652 RBC SED RATE AUTOMATED: CPT

## 2019-06-08 PROCEDURE — 85027 COMPLETE CBC AUTOMATED: CPT

## 2019-06-08 PROCEDURE — 80053 COMPREHEN METABOLIC PANEL: CPT

## 2019-06-08 PROCEDURE — 85730 THROMBOPLASTIN TIME PARTIAL: CPT

## 2019-06-08 PROCEDURE — 36415 COLL VENOUS BLD VENIPUNCTURE: CPT

## 2019-06-10 LAB
B BURGDOR IGG SER IA-ACNC: 0.1
B BURGDOR IGM SER IA-ACNC: 0.15
CRYOGLOB RF SER-ACNC: ABNORMAL [IU]/ML
RHEUMATOID FACT SER QL LA: POSITIVE
RYE IGE QN: NEGATIVE

## 2019-06-11 ENCOUNTER — HOSPITAL ENCOUNTER (OUTPATIENT)
Dept: RADIOLOGY | Facility: CLINIC | Age: 54
Discharge: HOME | End: 2019-06-11

## 2019-06-11 ENCOUNTER — TELEPHONE (OUTPATIENT)
Dept: RADIOLOGY | Facility: CLINIC | Age: 54
End: 2019-06-11

## 2019-06-11 DIAGNOSIS — G89.4 CHRONIC PAIN SYNDROME: ICD-10-CM

## 2019-06-11 DIAGNOSIS — M54.16 LUMBAR RADICULOPATHY: ICD-10-CM

## 2019-06-11 DIAGNOSIS — M96.1 POSTLAMINECTOMY SYNDROME, LUMBAR REGION: ICD-10-CM

## 2019-06-11 RX ORDER — CEFAZOLIN SODIUM 2 G/50ML
2000 SOLUTION INTRAVENOUS ONCE
Status: CANCELLED | OUTPATIENT
Start: 2019-06-11 | End: 2019-06-11

## 2019-06-13 DIAGNOSIS — Z79.01 CHRONIC ANTICOAGULATION: ICD-10-CM

## 2019-06-13 DIAGNOSIS — M54.16 LUMBAR RADICULOPATHY: ICD-10-CM

## 2019-06-13 DIAGNOSIS — M96.1 POSTLAMINECTOMY SYNDROME, LUMBAR REGION: ICD-10-CM

## 2019-06-13 DIAGNOSIS — G89.4 CHRONIC PAIN SYNDROME: Primary | ICD-10-CM

## 2019-06-13 DIAGNOSIS — Z01.818 PRE-OP EXAM: ICD-10-CM

## 2019-06-13 RX ORDER — CEFAZOLIN SODIUM 1 G/50ML
1000 SOLUTION INTRAVENOUS ONCE
Status: CANCELLED | OUTPATIENT
Start: 2019-06-13 | End: 2019-06-13

## 2019-07-01 ENCOUNTER — CLINICAL SUPPORT (OUTPATIENT)
Dept: PAIN MEDICINE | Facility: CLINIC | Age: 54
End: 2019-07-01
Payer: MEDICARE

## 2019-07-01 VITALS
TEMPERATURE: 98.7 F | SYSTOLIC BLOOD PRESSURE: 135 MMHG | HEART RATE: 59 BPM | HEIGHT: 68 IN | BODY MASS INDEX: 25.04 KG/M2 | WEIGHT: 165.2 LBS | DIASTOLIC BLOOD PRESSURE: 76 MMHG

## 2019-07-01 DIAGNOSIS — M96.1 LUMBAR POST-LAMINECTOMY SYNDROME: ICD-10-CM

## 2019-07-01 DIAGNOSIS — G89.4 CHRONIC PAIN SYNDROME: Primary | ICD-10-CM

## 2019-07-01 DIAGNOSIS — M54.16 LUMBAR RADICULOPATHY: ICD-10-CM

## 2019-07-01 PROCEDURE — 99213 OFFICE O/P EST LOW 20 MIN: CPT | Performed by: ANESTHESIOLOGY

## 2019-07-01 NOTE — PROGRESS NOTES
Assessment  1  Chronic pain syndrome    2  Lumbar post-laminectomy syndrome    3  Lumbar radiculopathy        Plan  65-year-old male with a history of L5-S1 fusion and chronic radiculopathy into the S1 distribution of the right lower extremity presenting to review and go over preprocedural instructions and consent forms for [employer] spinal cord stimulator trial   This was initially scheduled for June 11, 2019, however the patient did not stop his naproxen prior to the procedure and therefore needed to be postponed  All of the patient's questions and concerns regarding the spinal cord stimulator trial were answered to the patient's satisfaction  All preprocedural instructions were reviewed with the patient and consent forms were completed at today's visit  The patient's spinal cord stimulator trial as scheduled for Ana 10, 2019  The patient was advised to get his blood work done by Ana 3, 2019  Subsequent lead removal will be June 16, 2019  The patient was instructed to stop his naproxen on June 6, 2019  We will proceed with [employer] spinal cord stimulator trial as scheduled  Complete risks and benefits including bleeding, infection, tissue reaction, nerve injury and allergic reaction were discussed  The approach was demonstrated using models and literature was provided  Verbal and written consent was obtained  My impressions and treatment recommendations were discussed in detail with the patient who verbalized understanding and had no further questions  Discharge instructions were provided  I personally saw and examined the patient and I agree with the above discussed plan of care  No orders of the defined types were placed in this encounter  No orders of the defined types were placed in this encounter        History of Present Illness    Fidel Kelly is a 46 y o  male returning for follow-up of lumbosacral back pain that radiates into the posterior aspect of the right leg with associated paresthesias  He denies any right lower extremity weakness, bladder bowel incontinence, or saddle anesthesia  He denies any left lower extremity symptoms  The patient rates his pain an 8/10 on the pain is worse in the morning and at night  The pain is constant and described as shooting and pins and needles  The pain is worse with standing, walking, and exercise  The pain is alleviated with sitting, relaxation, and lying down  The patient is currently utilizing fentanyl patch 150 micro g Q 72 hours transdermal, duloxetine 60 mg daily, and gabapentin 400 mg t i d  Which gives mild relief  This is prescribed by his PCP  I have personally reviewed and/or updated the patient's past medical history, past surgical history, family history, social history, current medications, allergies, and vital signs today  Other than as stated above, the patient denies any interval changes in medications, medical condition, mental condition, symptoms, or allergies since the last office visit  Review of Systems   Constitutional: Negative for fever and unexpected weight change  HENT: Negative for trouble swallowing  Eyes: Negative for visual disturbance  Respiratory: Negative for shortness of breath and wheezing  Cardiovascular: Negative for chest pain and palpitations  Gastrointestinal: Positive for constipation  Negative for diarrhea, nausea and vomiting  Endocrine: Negative for cold intolerance, heat intolerance and polydipsia  Genitourinary: Negative for difficulty urinating and frequency  Musculoskeletal: Positive for gait problem  Negative for arthralgias, joint swelling and myalgias  Skin: Negative for rash  Neurological: Negative for dizziness, seizures, syncope, weakness and headaches  Hematological: Does not bruise/bleed easily  Psychiatric/Behavioral: Negative for dysphoric mood  All other systems reviewed and are negative        Patient Active Problem List   Diagnosis  Chest pain in adult    Other hyperlipidemia    Chronic low back pain    Diaphoresis    Screening for colon cancer    Encounter for screening colonoscopy    Lumbar radiculopathy    Lumbar post-laminectomy syndrome    Lumbar foraminal stenosis    Lumbar spondylosis    Chronic pain syndrome    Recurrent major depressive disorder, in partial remission ([facility] [address] )       Past Medical History:   Diagnosis Date    Anxiety and depression     Chronic back pain     Hypercholesterolemia     last assessed 7/19/17    Hyperlipidemia     Mastitis     Sinusitis        Past Surgical History:   Procedure Laterality Date    BACK SURGERY      back sx for fx + disc rupture    BACK SURGERY      lower    [place] COLONOSCOPY FLX DX W/COLLJ SPEC WHEN PFRMD N/A 8/13/2018    Procedure: COLONOSCOPY;  Surgeon: Uday Stanley MD;  [facility];   Service: Gastroenterology       Family History   Problem Relation Age of Onset    Diabetes Mother         mellitus    Stroke Father         syndrome    Hypertension Family        Social History     Occupational History    Not on file   Tobacco Use    Smoking status: Current Every Day Smoker     Packs/day: 0 25     Types: Cigarettes    Smokeless tobacco: Never Used    Tobacco comment: declined; former smoker as per [employer]   Substance and Sexual Activity    Alcohol use: No    Drug use: No    Sexual activity: Not on file       Current Outpatient Medications on File Prior to Visit   Medication Sig    atorvastatin (LIPITOR) 20 mg tablet Take 1 tablet (20 mg total) by mouth daily    DULoxetine (CYMBALTA) 60 mg delayed release capsule Take 1 capsule (60 mg total) by mouth daily    fentaNYL (DURAGESIC) 100 mcg/hr TD 72 hr patch Place 1 patch on the skin every third dayMax Daily Amount: 1 patch    fentaNYL (DURAGESIC) 50 mcg/hr Place 1 patch on the skin every third dayMax Daily Amount: 1 patch    gabapentin (NEURONTIN) 400 mg capsule Take 1 capsule (400 mg total) by mouth 3 (three) times a day    naproxen (NAPROSYN) 500 mg tablet Take 1 tablet by mouth every 12 (twelve) hours as needed     No current facility-administered medications on file prior to visit  No Known Allergies    Physical Exam    /76   Pulse 59   Temp 98 7 °F (37 1 °C) (Oral)   Ht 5' 8" (1 727 m)   Wt 74 9 kg (165 lb 3 2 oz)   BMI 25 12 kg/m²     Constitutional: normal, well developed, well nourished, alert, in no distress and non-toxic and no overt pain behavior  Eyes: anicteric  HEENT: grossly intact  Neck: supple, symmetric, trachea midline and no masses   Pulmonary:even and unlabored  Cardiovascular:No edema or pitting edema present  Skin:Normal without rashes or lesions and well hydrated  Psychiatric:Mood and affect appropriate  Neurologic:Cranial Nerves II-XII grossly intact  Musculoskeletal:antalgic gait  Bilateral lumbar paraspinals tender to palpation and ropy in texture from L3-S1  Bilateral lower extremity strength 5/5 in all muscle groups  Sensation intact to light touch in L3 through S1 dermatomes bilaterally  Positive seated straight leg raise on the right and negative on the left      Imaging        Pain Assessment Measures   Numeric Rating Scale    Oswestry Disability Index Score/Neck Disability Index Score 36   PROMIS-29   - Physical Function 14   - Anxiety 7   - Depression 5   - Fatigue 7   - Sleep Disturbance 14   - Ability to Participate in Social Roles And Activities 5   - Pain Interference 18

## 2019-07-08 ENCOUNTER — APPOINTMENT (OUTPATIENT)
Dept: LAB | Facility: HOSPITAL | Age: 54
End: 2019-07-08
Payer: MEDICARE

## 2019-07-08 DIAGNOSIS — M54.16 LUMBAR RADICULOPATHY: ICD-10-CM

## 2019-07-08 DIAGNOSIS — Z01.818 PRE-OP EXAM: ICD-10-CM

## 2019-07-08 DIAGNOSIS — G89.4 CHRONIC PAIN SYNDROME: ICD-10-CM

## 2019-07-08 DIAGNOSIS — M96.1 POSTLAMINECTOMY SYNDROME, LUMBAR REGION: ICD-10-CM

## 2019-07-08 DIAGNOSIS — Z79.01 CHRONIC ANTICOAGULATION: ICD-10-CM

## 2019-07-08 LAB
ALBUMIN SERPL BCP-MCNC: 4.1 G/DL (ref 3–5.2)
ALP SERPL-CCNC: 53 U/L (ref 43–122)
ALT SERPL W P-5'-P-CCNC: 22 U/L (ref 9–52)
ANION GAP SERPL CALCULATED.3IONS-SCNC: 10 MMOL/L (ref 5–14)
APTT PPP: 28 SECONDS (ref 23–34)
AST SERPL W P-5'-P-CCNC: 21 U/L (ref 17–59)
BILIRUB SERPL-MCNC: 0.7 MG/DL
BUN SERPL-MCNC: 18 MG/DL (ref 5–25)
CALCIUM SERPL-MCNC: 9.2 MG/DL (ref 8.4–10.2)
CHLORIDE SERPL-SCNC: 104 MMOL/L (ref 97–108)
CO2 SERPL-SCNC: 26 MMOL/L (ref 22–30)
CREAT SERPL-MCNC: 0.82 MG/DL (ref 0.7–1.5)
ERYTHROCYTE [DISTWIDTH] IN BLOOD BY AUTOMATED COUNT: 14 %
EST. AVERAGE GLUCOSE BLD GHB EST-MCNC: 114 MG/DL
GFR SERPL CREATININE-BSD FRML MDRD: 100 ML/MIN/1.73SQ M
GLUCOSE P FAST SERPL-MCNC: 133 MG/DL (ref 70–99)
HBA1C MFR BLD: 5.6 % (ref 4.2–6.3)
HCT VFR BLD AUTO: 45.7 % (ref 41–53)
HGB BLD-MCNC: 15.4 G/DL (ref 13.5–17.5)
INR PPP: 1.01 (ref 0.89–1.1)
MCH RBC QN AUTO: 30 PG (ref 26–34)
MCHC RBC AUTO-ENTMCNC: 33.7 G/DL (ref 31–36)
MCV RBC AUTO: 89 FL (ref 80–100)
PLATELET # BLD AUTO: 171 THOUSANDS/UL (ref 150–450)
PMV BLD AUTO: 9.6 FL (ref 8.9–12.7)
POTASSIUM SERPL-SCNC: 3.9 MMOL/L (ref 3.6–5)
PROT SERPL-MCNC: 7 G/DL (ref 5.9–8.4)
PROTHROMBIN TIME: 10.7 SECONDS (ref 9.5–11.6)
RBC # BLD AUTO: 5.14 MILLION/UL (ref 4.5–5.9)
SODIUM SERPL-SCNC: 140 MMOL/L (ref 137–147)
WBC # BLD AUTO: 8.3 THOUSAND/UL (ref 4.5–11)

## 2019-07-08 PROCEDURE — 83036 HEMOGLOBIN GLYCOSYLATED A1C: CPT

## 2019-07-08 PROCEDURE — 80053 COMPREHEN METABOLIC PANEL: CPT

## 2019-07-08 PROCEDURE — 85610 PROTHROMBIN TIME: CPT

## 2019-07-08 PROCEDURE — 85027 COMPLETE CBC AUTOMATED: CPT

## 2019-07-08 PROCEDURE — 85730 THROMBOPLASTIN TIME PARTIAL: CPT

## 2019-07-08 PROCEDURE — 36415 COLL VENOUS BLD VENIPUNCTURE: CPT

## 2019-07-10 ENCOUNTER — TRANSCRIBE ORDERS (OUTPATIENT)
Dept: RADIOLOGY | Facility: HOSPITAL | Age: 54
End: 2019-07-10

## 2019-07-10 ENCOUNTER — HOSPITAL ENCOUNTER (OUTPATIENT)
Dept: RADIOLOGY | Facility: HOSPITAL | Age: 54
Discharge: HOME | End: 2019-07-10
Attending: ANESTHESIOLOGY
Payer: MEDICARE

## 2019-07-10 ENCOUNTER — TELEPHONE (OUTPATIENT)
Dept: RADIOLOGY | Facility: CLINIC | Age: 54
End: 2019-07-10

## 2019-07-10 ENCOUNTER — HOSPITAL ENCOUNTER (OUTPATIENT)
Dept: RADIOLOGY | Facility: CLINIC | Age: 54
Discharge: HOME | End: 2019-07-10
Admitting: ANESTHESIOLOGY
Payer: MEDICARE

## 2019-07-10 VITALS
TEMPERATURE: 98.3 F | HEART RATE: 57 BPM | DIASTOLIC BLOOD PRESSURE: 87 MMHG | OXYGEN SATURATION: 96 % | SYSTOLIC BLOOD PRESSURE: 146 MMHG | RESPIRATION RATE: 20 BRPM

## 2019-07-10 DIAGNOSIS — Z98.890 STATUS POST SURGERY: Primary | ICD-10-CM

## 2019-07-10 DIAGNOSIS — Z98.890 STATUS POST SURGERY: ICD-10-CM

## 2019-07-10 DIAGNOSIS — G89.4 CHRONIC PAIN SYNDROME: ICD-10-CM

## 2019-07-10 DIAGNOSIS — M54.16 LUMBAR RADICULOPATHY: ICD-10-CM

## 2019-07-10 DIAGNOSIS — M96.1 POSTLAMINECTOMY SYNDROME, LUMBAR REGION: ICD-10-CM

## 2019-07-10 PROCEDURE — C1787 PATIENT PROGR, NEUROSTIM: HCPCS

## 2019-07-10 PROCEDURE — 72070 X-RAY EXAM THORAC SPINE 2VWS: CPT

## 2019-07-10 PROCEDURE — 96374 THER/PROPH/DIAG INJ IV PUSH: CPT

## 2019-07-10 PROCEDURE — C1897 LEAD, NEUROSTIM TEST KIT: HCPCS

## 2019-07-10 PROCEDURE — 95972 ALYS CPLX SP/PN NPGT W/PRGRM: CPT | Performed by: ANESTHESIOLOGY

## 2019-07-10 PROCEDURE — 63650 IMPLANT NEUROELECTRODES: CPT | Performed by: ANESTHESIOLOGY

## 2019-07-10 RX ORDER — CEFAZOLIN SODIUM 1 G/50ML
1000 SOLUTION INTRAVENOUS ONCE
Status: COMPLETED | OUTPATIENT
Start: 2019-07-10 | End: 2019-07-10

## 2019-07-10 RX ORDER — CEPHALEXIN 500 MG/1
500 CAPSULE ORAL EVERY 6 HOURS SCHEDULED
Qty: 28 CAPSULE | Refills: 0 | Status: SHIPPED | OUTPATIENT
Start: 2019-07-10 | End: 2019-07-17

## 2019-07-10 RX ADMIN — CEFAZOLIN SODIUM 1000 MG: 1 SOLUTION INTRAVENOUS at 14:00

## 2019-07-10 RX ADMIN — LIDOCAINE HYDROCHLORIDE 9 ML: 10; .005 INJECTION, SOLUTION EPIDURAL; INFILTRATION; INTRACAUDAL; PERINEURAL at 15:18

## 2019-07-10 NOTE — DISCHARGE INSTRUCTIONS
[facility]INE [facility]: SPINAL CORD STIMULATION/PERIPHERAL NERVE STIMULATION TRIAL/ PERMANENT IMPLANT DISCHARGE INSTRUCTIONS    ACTIVITY RESTRICTIONS DURING TRIAL PERIOD  · Do not drive with the SCS "on"  · Do not bend or twist at the waist   · Do not lift anything that weighs over 5 pounds  · When you lie down, "log roll" (keep spine aligned) to change positions  Do not sleep on your stomach  · Limit stair climbing and strenuous activity  CARE OF THE INJECTION SITE  · CHECK THE DRESSING DAILY  It should intact  · Notify the [facility] if you have any of the following: redness, drainage, swelling, chills or fever over 100°F   · Do not change dressing, only reinforce edges if necessary  · Keep the dressing dry  Do not shower, (sponge baths only) until evaluated in office  SPECIAL INSTRUCTIONS  · Take your temperature daily  · Follow-up for lead removal scheduled for ***  · The  box is expensive  DO NOT drop or get wet  · Do not pull on the cable or leads  Do not disconnect the cable and lead  · Do activities that normally cause you to have pain and try different  settings  · Obtain post procedure x-ray shortly after procedure if ordered by physician  MEDICATIONS  · Continue to take all routine medications  · Our office may have instructed you to hold some medications  · You may resume _______________________________________________  · Take antiobiotic as prescribed:_____________________________________  If you have any problems specifically related to your procedure, please call our office at [phone]  Problems not related to your procedure should be directed at your primary care physician  Contact the company representative with any questions regarding settings or operation of the external  box

## 2019-07-10 NOTE — TELEPHONE ENCOUNTER
Patient is S/P a SCS trial c/ [employer] on 7/10/19  Lead removal scheduled on 7/16/19  Please call on 7/11/19 post OPRO   thanks

## 2019-07-10 NOTE — TELEPHONE ENCOUNTER
To be called on 7/11/19      [employer] SCS trial one lead placed by Monroe Cortes on 7/10/19  Lead removal on 7/16/19 with [address] [place] [address] Drive

## 2019-07-11 NOTE — TELEPHONE ENCOUNTER
ALEKSANDR    S/w pt who states that he did okay overnight, he is getting great coverage of his R side, R leg pain is "much better," and pt has already noticed a reduction in his pain to a 6/10  Pt states that he tried all of his settings, everything is covering the correct areas  Pt states what is bothering him is the procedure site pain, per pt that is uncomfortable  Pt was asked if the dressing is still intact and if anyone has had a chance to look at the site itself for any s/s of infection  Pt states that dressing is intact, no one has had a chance to look at the site itself, but pt denies fevers  Pt states that it is just sore where the lead went in, but he has not taken any medication for it yet  Pt was advised that he cannot take any NSAIDs during his trial because those medications thin the blood, however, if his procedure site pain is unbearable he could try OTC Tylenol and see if that helps  Pt verbalized understanding and was appreciative  Confirmed w/ pt that if he has any programming issues, he is to contact his [employer] rep  Pt verbalized understanding  Confirmed lead pull w/ pt, Tuesday 7/16 w/ [address], arrival 1115 am  Pt verbalized understanding and was appreciative

## 2019-07-16 ENCOUNTER — OFFICE VISIT (OUTPATIENT)
Dept: PAIN MEDICINE | Facility: CLINIC | Age: 54
End: 2019-07-16

## 2019-07-16 VITALS
SYSTOLIC BLOOD PRESSURE: 122 MMHG | HEART RATE: 66 BPM | BODY MASS INDEX: 25.01 KG/M2 | WEIGHT: 165 LBS | HEIGHT: 68 IN | DIASTOLIC BLOOD PRESSURE: 81 MMHG

## 2019-07-16 DIAGNOSIS — F33.41 RECURRENT MAJOR DEPRESSIVE DISORDER, IN PARTIAL REMISSION (HCC): Chronic | ICD-10-CM

## 2019-07-16 DIAGNOSIS — G89.4 CHRONIC PAIN SYNDROME: Chronic | ICD-10-CM

## 2019-07-16 DIAGNOSIS — M96.1 POSTLAMINECTOMY SYNDROME, LUMBAR REGION: Primary | ICD-10-CM

## 2019-07-16 DIAGNOSIS — G89.29 OTHER CHRONIC PAIN: ICD-10-CM

## 2019-07-16 PROCEDURE — 99024 POSTOP FOLLOW-UP VISIT: CPT | Performed by: NURSE PRACTITIONER

## 2019-07-16 RX ORDER — DULOXETIN HYDROCHLORIDE 60 MG/1
60 CAPSULE, DELAYED RELEASE ORAL DAILY
Qty: 30 CAPSULE | Refills: 5 | Status: SHIPPED | OUTPATIENT
Start: 2019-07-16 | End: 2019-08-08 | Stop reason: ALTCHOICE

## 2019-07-16 RX ORDER — FENTANYL 50 UG/H
1 PATCH TRANSDERMAL
Qty: 10 PATCH | Refills: 0 | Status: SHIPPED | OUTPATIENT
Start: 2019-07-16 | End: 2019-08-23 | Stop reason: SDUPTHER

## 2019-07-16 RX ORDER — FENTANYL 100 UG/H
1 PATCH TRANSDERMAL
Qty: 10 PATCH | Refills: 0 | Status: SHIPPED | OUTPATIENT
Start: 2019-07-16 | End: 2019-08-23 | Stop reason: SDUPTHER

## 2019-07-16 NOTE — PROGRESS NOTES
Assessment  1  Postlaminectomy syndrome, lumbar region  Ambulatory referral to Neurosurgery       Plan  The stimulator device was removed at today's office visit, with all leads in tact  No significant erythema or drainage noted at the wound site  I cleaned the area with alcohol prep and covered it with a sterile bandage  Recommend the patient to not submerge in water for at least 72 hours and to finish the full prescribed course of antibiotics  The patient may shower, but recommended they avoid direct water over the area until it is healed  The patient had a successful Medtronic spinal cord stimulator trial with almost complete resolution of his right lower extremity symptoms, about %, and improve function  He was able to walk for longer periods of time, walk for longer distances, sit for longer periods of time, and grocery shop without significant pain  The patient will be referred to Dr Jairo Miguel for permanent implantation  The patient will then follow back in our office 4-6 weeks after permanent implantation for re-evaluation  Orders Placed This Encounter   Procedures    Ambulatory referral to Neurosurgery     Standing Status:   Future     Standing Expiration Date:   1/16/2020     Referral Priority:   Routine     Referral Type:   Consult - AMB     Referral Reason:   Specialty Services Required     Referred to Provider:   Gwen Schmidt MD     Requested Specialty:   Neurosurgery     Number of Visits Requested:   1     Expiration Date:   7/16/2020         History of Present Illness    The patient is a 46 y o  male status post Medtronic spinal cord stimulator percutaneous lead placement on 7/10/19   The patient's reported an overall reduction in pain of % during the trial   The patient reported functional improvement included walking and standing for longer periods of time without pain and reported that he could grocery shop without severe pain   He states his right lower extremity pain was also completely resolved and is extremely happy with the outcome of the trial     Pain Assessment Measures   Numeric Rating Scale 5   Oswestry Disability Index Score/Neck Disability Index Score 24   PROMIS-29   - Physical Function 16   - Anxiety 6   - Depression 4   - Fatigue 7   - Sleep Disturbance 15   - Ability to Participate in Social Roles And Activities 9   - Pain Interference 19     Review of Systems    Patient Active Problem List   Diagnosis    Chest pain in adult    Other hyperlipidemia    Chronic low back pain    Diaphoresis    Screening for colon cancer    Encounter for screening colonoscopy    Lumbar radiculopathy    Postlaminectomy syndrome, lumbar region    Lumbar foraminal stenosis    Lumbar spondylosis    Chronic pain syndrome    [facility]        Past Medical History:   Diagnosis Date    Anxiety and depression     Chronic back pain     Hypercholesterolemia     last assessed 7/19/17    Hyperlipidemia     Mastitis     Sinusitis        Past Surgical History:   Procedure Laterality Date    BACK SURGERY      back sx for fx + disc rupture    BACK SURGERY      lower    [place] COLONOSCOPY FLX DX W/COLLJ SPEC WHEN PFRMD N/A 8/13/2018    Procedure: COLONOSCOPY;  Surgeon: Abdias Awan MD;  [facility];   Service: Gastroenterology       Family History   Problem Relation Age of Onset    Diabetes Mother         mellitus    Stroke Father         syndrome    Hypertension Family        Social History     Occupational History    Not on file   Tobacco Use    Smoking status: Current Every Day Smoker     Packs/day: 0 25     Types: Cigarettes    Smokeless tobacco: Never Used    Tobacco comment: declined; former smoker as per [employer]   Substance and Sexual Activity    Alcohol use: No    Drug use: No    Sexual activity: Not on file         Current Outpatient Medications:     atorvastatin (LIPITOR) 20 mg tablet, Take 1 tablet (20 mg total) by mouth daily, Disp: 30 tablet, Rfl: 5    cephalexin (KEFLEX) 500 mg capsule, Take 1 capsule (500 mg total) by mouth every 6 (six) hours for 7 days, Disp: 28 capsule, Rfl: 0    DULoxetine (CYMBALTA) 60 mg delayed release capsule, Take 1 capsule (60 mg total) by mouth daily, Disp: 30 capsule, Rfl: 5    fentaNYL (DURAGESIC) 100 mcg/hr TD 72 hr patch, Place 1 patch on the skin every third dayMax Daily Amount: 1 patch, Disp: 10 patch, Rfl: 0    fentaNYL (DURAGESIC) 50 mcg/hr, Place 1 patch on the skin every third dayMax Daily Amount: 1 patch, Disp: 10 patch, Rfl: 0    gabapentin (NEURONTIN) 400 mg capsule, Take 1 capsule (400 mg total) by mouth 3 (three) times a day, Disp: 90 capsule, Rfl: 1    naproxen (NAPROSYN) 500 mg tablet, Take 1 tablet by mouth every 12 (twelve) hours as needed, Disp: , Rfl:     No Known Allergies      Physical Exam    /81   Pulse 66   Ht 5' 8" (1 727 m)   Wt 74 8 kg (165 lb)   BMI 25 09 kg/m²     Thoracic Spine:  Spinal cord stimulator lead removed with tip intact; no erythema, tenderness or signs of infection; area cleansed with alcohol and bandage placed

## 2019-07-16 NOTE — TELEPHONE ENCOUNTER
06/06/2019  1  05/30/2019  FENTANYL 50 MCG/HR PATCH  10 0  30  [facility]  [id number]  PENNS ([id number])  0  120 0 MME  [place]  [place]    05/30/2019  1  05/30/2019  FENTANYL 100 MCG/HR PATCH  10 0  30  [facility]

## 2019-07-16 NOTE — TELEPHONE ENCOUNTER
Patient is out completely of his meds:    fentaNYL (DURAGESIC) 100 mcg/hr TD 72 hr patch, Dose: 1 patch, Route: Transdermal, Frequency: Every 72 hours  Note to Pharmacy: Updated script - Dr Summer Tipton, Dispense Quantity: 10 patch, Sig: Place 1 patch on the skin every third dayMax Daily Amount: 1 patch        fentaNYL (DURAGESIC) 50 mcg/hr, Dose: 1 patch, Route: Transdermal, Frequency: Every 72 hours, Note to Pharmacy: Updated script - Dr Summer Tipton, Dispense Quantity: 10 patch, Sig: Place 1 patch on the skin every third dayMax Daily Amount: 1 patch      DULoxetine (CYMBALTA) 60 mg delayed release capsule, Dose: 60 mg, Route: Oral, Frequency: Daily, Dispense Quantity: 30 capsule, Refills: 5, Sig: Take 1 capsule (60 mg total) by mouth daily      [facility]  [phone]

## 2019-07-29 ENCOUNTER — OFFICE VISIT (OUTPATIENT)
Dept: NEUROSURGERY | Facility: CLINIC | Age: 54
End: 2019-07-29
Payer: MEDICARE

## 2019-07-29 VITALS
TEMPERATURE: 97.9 F | WEIGHT: 169 LBS | HEART RATE: 65 BPM | HEIGHT: 68 IN | SYSTOLIC BLOOD PRESSURE: 116 MMHG | RESPIRATION RATE: 16 BRPM | DIASTOLIC BLOOD PRESSURE: 70 MMHG | BODY MASS INDEX: 25.61 KG/M2

## 2019-07-29 DIAGNOSIS — G89.4 CHRONIC PAIN SYNDROME: Primary | ICD-10-CM

## 2019-07-29 DIAGNOSIS — M96.1 POSTLAMINECTOMY SYNDROME, LUMBAR REGION: ICD-10-CM

## 2019-07-29 PROCEDURE — 99215 OFFICE O/P EST HI 40 MIN: CPT | Performed by: NEUROLOGICAL SURGERY

## 2019-07-29 RX ORDER — CHLORHEXIDINE GLUCONATE 0.12 MG/ML
15 RINSE ORAL ONCE
Status: CANCELLED | OUTPATIENT
Start: 2019-07-29 | End: 2019-07-29

## 2019-07-29 NOTE — H&P (VIEW-ONLY)
Assessment/Plan:    No problem-specific Assessment & Plan notes found for this encounter  Patient is stable  Symptoms, as detailed in HPI, continue to significantly impact of patient's quality of life in daily activities  After carefully considering presentation, investigations, functional status and co-morbidities, the risk/benefit profile of surgical intervention is favorable  History, physical examination and diagnostic tests were reviewed and questions answered  Diagnosis, care plan and treatment options were discussed  The patient understand instructions and will follow up as directed  Patient with successful medtronic spinal cord stimulator trial with > 90% relief of right leg pain  We will proceed with percutaneous thoracic spinal cord stimulation implant with right buttock generator     Expected postoperative course, including activity restrictions, expected pain and postoperative medication were reviewed  Patient provided verbal consent to surgical procedure and signed consent form: Yes    We also discussed the risks and benefits of the procedure the risks being including: infection (~2%), neurologic injury (<1%), new pain, revisions surgery, failure to relieve pain, hardware issues  The benefits including relief of pain similar to trial  The patient stated understanding of the risks and benefits and agreed to proceed  IMAGING REVIEWED: MRI thoracic shows no limiting stenosis for implantation         Diagnoses and all orders for this visit:    Chronic pain syndrome  -     Case request operating room: INSERTION THORACIC DORSAL COLUMN SPINAL CORD STIMULATOR PERCUTANEOUS, RIGHT BUTTOCK; Standing  -     Case request operating room: INSERTION THORACIC DORSAL COLUMN SPINAL CORD STIMULATOR PERCUTANEOUS, RIGHT BUTTOCK    Postlaminectomy syndrome, lumbar region  -     Ambulatory referral to Neurosurgery  -     Case request operating room: Marimar Cormier 13 PERCUTANEOUS, RIGHT BUTTOCK; Standing  -     Case request operating room: INSERTION THORACIC DORSAL COLUMN SPINAL CORD STIMULATOR PERCUTANEOUS, RIGHT BUTTOCK    Other orders  -     Diet NPO; Sips with meds; Standing  -     Nursing Communication [facility] Interventions Implemented; Standing  -     Nursing Communication Use 2 CHG cloths, have the patient wash his/her body from the neck down or have staff wash entire body (from neck down) if patient is unable; Standing  -     Nursing Communication Swab both nares with Povidone-Iodine solution, EXCLUDE if patient has shellfish/Iodine allergy; Standing  -     chlorhexidine (PERIDEX) 0 12 % oral rinse 15 mL  -     Void on call to OR; Standing  -     Insert peripheral IV; Standing  -     ceFAZolin (ANCEF) 2,000 mg in dextrose 5 % 100 mL IVPB          Subjective:      Patient ID: Mia Martin is a 46 y o  male  Patient is a 46year old male with symptoms of right lower extremity pain  Pain is severe and throbbing in quality  Pain distribution is right leg  Was seen by me in the past and I recommended spinal cord stimulation at that time  The patient underwent a successful medtronic spinal cord stimulation trial with greater than % relief of pain  They reported improvement in sleep and ambulation  They are ready to proceed with surgery  The following portions of the patient's history were reviewed and updated as appropriate:   He  has a past medical history of Anxiety and depression, Chronic back pain, Hypercholesterolemia, Hyperlipidemia, Mastitis, and Sinusitis    He   Patient Active Problem List    Diagnosis Date Noted    Recurrent major depressive disorder, in partial [facility] ([facility] ) 02/21/2019    Postlaminectomy syndrome, lumbar region 11/30/2018    Lumbar foraminal stenosis 11/30/2018    Lumbar spondylosis 11/30/2018    Chronic pain syndrome 11/30/2018    Lumbar radiculopathy     Encounter for screening colonoscopy 08/06/2018    Screening for colon cancer 07/19/2018    Chest pain in adult 10/28/2017    Other hyperlipidemia 10/28/2017    Chronic low back pain 10/28/2017    Diaphoresis 10/28/2017     He  has a past surgical history that includes Back surgery; Back surgery; and pr colonoscopy flx dx w/collj spec when pfrmd (N/A, 8/13/2018)  His family history includes Diabetes in his mother; Hypertension in his family; Stroke in his father  He  reports that he has been smoking cigarettes  He has been smoking about 0 25 packs per day  He has never used smokeless tobacco  He reports that he does not drink alcohol or use drugs  Current Outpatient Medications   Medication Sig Dispense Refill    atorvastatin (LIPITOR) 20 mg tablet Take 1 tablet (20 mg total) by mouth daily 30 tablet 5    DULoxetine (CYMBALTA) 60 mg delayed release capsule Take 1 capsule (60 mg total) by mouth daily 30 capsule 5    fentaNYL (DURAGESIC) 100 mcg/hr TD 72 hr patch Place 1 patch on the skin every third dayMax Daily Amount: 1 patch 10 patch 0    fentaNYL (DURAGESIC) 50 mcg/hr Place 1 patch on the skin every third dayMax Daily Amount: 1 patch 10 patch 0    gabapentin (NEURONTIN) 400 mg capsule Take 1 capsule (400 mg total) by mouth 3 (three) times a day 90 capsule 1     No current facility-administered medications for this visit        Current Outpatient Medications on File Prior to Visit   Medication Sig    atorvastatin (LIPITOR) 20 mg tablet Take 1 tablet (20 mg total) by mouth daily    DULoxetine (CYMBALTA) 60 mg delayed release capsule Take 1 capsule (60 mg total) by mouth daily    fentaNYL (DURAGESIC) 100 mcg/hr TD 72 hr patch Place 1 patch on the skin every third dayMax Daily Amount: 1 patch    fentaNYL (DURAGESIC) 50 mcg/hr Place 1 patch on the skin every third dayMax Daily Amount: 1 patch    gabapentin (NEURONTIN) 400 mg capsule Take 1 capsule (400 mg total) by mouth 3 (three) times a day    [DISCONTINUED] naproxen (NAPROSYN) 500 mg tablet Take 1 tablet by mouth every 12 (twelve) hours as needed     No current facility-administered medications on file prior to visit  He has No Known Allergies       Review of Systems   Constitutional: Positive for fatigue  HENT: Negative  Eyes: Negative  Respiratory: Negative  Cardiovascular: Negative  Gastrointestinal: Negative  Endocrine: Negative  Genitourinary: Negative  Musculoskeletal: Positive for back pain (center to right Lower back pain radiating down back of right leg into toes)  Skin: Negative  Allergic/Immunologic: Negative  Neurological: Positive for weakness (right leg) and numbness (back of right leg )  Hematological: Negative  Psychiatric/Behavioral: Negative  Objective:      /70 (BP Location: Left arm)   Pulse 65   Temp 97 9 °F (36 6 °C) (Tympanic)   Resp 16   Ht 5' 8" (1 727 m)   Wt 76 7 kg (169 lb)   BMI 25 70 kg/m²          Physical Exam   Constitutional: He is oriented to person, place, and time  He appears well-developed and well-nourished  HENT:   Head: Normocephalic and atraumatic  Nose: Nose normal    Eyes: Pupils are equal, round, and reactive to light  Conjunctivae and EOM are normal  Right eye exhibits no discharge  Left eye exhibits no discharge  Neck: Normal range of motion  No tracheal deviation present  No thyromegaly present  Cardiovascular: Normal rate  Pulmonary/Chest: Effort normal and breath sounds normal  No stridor  No respiratory distress  Abdominal: Soft  Musculoskeletal: Normal range of motion  He exhibits no edema  Neurological: He is alert and oriented to person, place, and time  He has normal strength and normal reflexes  No cranial nerve deficit or sensory deficit  Skin: Skin is warm and dry  No rash noted  He is not diaphoretic  No erythema  Psychiatric: He has a normal mood and affect   His behavior is normal  Judgment and thought content normal

## 2019-07-29 NOTE — LETTER
July 29, 2019     Megan Alonzo, [address] [address]    Patient: Mia Martin   YOB: 1965   Date of Visit: 7/29/2019       Dear Dr Elis Prasad: Thank you for referring Christian Arevalo to me for evaluation  Below are my notes for this consultation  If you have questions, please do not hesitate to call me  I look forward to following your patient along with you  Sincerely,        Thomas Rivera MD        CC: No Recipients  Thomas Rivera MD  7/29/2019  2:11 PM  Sign at close encounter  Assessment/Plan:    No problem-specific Assessment & Plan notes found for this encounter  Patient is stable  Symptoms, as detailed in HPI, continue to significantly impact of patient's quality of life in daily activities  After carefully considering presentation, investigations, functional status and co-morbidities, the risk/benefit profile of surgical intervention is favorable  History, physical examination and diagnostic tests were reviewed and questions answered  Diagnosis, care plan and treatment options were discussed  The patient understand instructions and will follow up as directed  Patient with successful medtronic spinal cord stimulator trial with > 90% relief of right leg pain  We will proceed with percutaneous thoracic spinal cord stimulation implant with right buttock generator     Expected postoperative course, including activity restrictions, expected pain and postoperative medication were reviewed  Patient provided verbal consent to surgical procedure and signed consent form: Yes    We also discussed the risks and benefits of the procedure the risks being including: infection (~2%), neurologic injury (<1%), new pain, revisions surgery, failure to relieve pain, hardware issues  The benefits including relief of pain similar to trial  The patient stated understanding of the risks and benefits and agreed to proceed           IMAGING REVIEWED: MRI thoracic shows no limiting stenosis for implantation  Diagnoses and all orders for this visit:    Chronic pain syndrome  -     Case request operating room: INSERTION THORACIC DORSAL COLUMN SPINAL CORD STIMULATOR PERCUTANEOUS, RIGHT BUTTOCK; Standing  -     Case request operating room: INSERTION THORACIC DORSAL COLUMN SPINAL CORD STIMULATOR PERCUTANEOUS, RIGHT BUTTOCK    Postlaminectomy syndrome, lumbar region  -     Ambulatory referral to Neurosurgery  -     Case request operating room: INSERTION THORACIC DORSAL COLUMN SPINAL CORD STIMULATOR PERCUTANEOUS, RIGHT BUTTOCK; Standing  -     Case request operating room: [address] [place] [address], RIGHT BUTTOCK    Other orders  -     Diet NPO; Sips with meds; Standing  -     Nursing Communication [facility] Interventions Implemented; Standing  -     Nursing Communication Use 2 CHG cloths, have the patient wash his/her body from the neck down or have staff wash entire body (from neck down) if patient is unable; Standing  -     Nursing Communication Swab both nares with Povidone-Iodine solution, EXCLUDE if patient has shellfish/Iodine allergy; Standing  -     chlorhexidine (PERIDEX) 0 12 % oral rinse 15 mL  -     Void on call to OR; Standing  -     Insert peripheral IV; Standing  -     ceFAZolin (ANCEF) 2,000 mg in dextrose 5 % 100 mL IVPB          Subjective:      Patient ID: Darci Urbina is a 46 y o  male  Patient is a 46year old male with symptoms of right lower extremity pain  Pain is severe and throbbing in quality  Pain distribution is right leg  Was seen by me in the past and I recommended spinal cord stimulation at that time  The patient underwent a successful medtronic spinal cord stimulation trial with greater than % relief of pain  They reported improvement in sleep and ambulation  They are ready to proceed with surgery         The following portions of the patient's history were reviewed and updated as appropriate:   He  has a past medical history of Anxiety and depression, Chronic back pain, Hypercholesterolemia, Hyperlipidemia, Mastitis, and Sinusitis  He   Patient Active Problem List    Diagnosis Date Noted    Recurrent major depressive disorder, in partial remission ([facility] ) 02/21/2019    Postlaminectomy syndrome, lumbar region 11/30/2018    Lumbar foraminal stenosis 11/30/2018    Lumbar spondylosis 11/30/2018    Chronic pain syndrome 11/30/2018    Lumbar radiculopathy     Encounter for screening colonoscopy 08/06/2018    Screening for colon cancer 07/19/2018    Chest pain in adult 10/28/2017    Other hyperlipidemia 10/28/2017    Chronic low back pain 10/28/2017    Diaphoresis 10/28/2017     He  has a past surgical history that includes Back surgery; Back surgery; and pr colonoscopy flx dx w/collj spec when pfrmd (N/A, 8/13/2018)  His family history includes Diabetes in his mother; Hypertension in his family; Stroke in his father  He  reports that he has been smoking cigarettes  He has been smoking about 0 25 packs per day  He has never used smokeless tobacco  He reports that he does not drink alcohol or use drugs  Current Outpatient Medications   Medication Sig Dispense Refill    atorvastatin (LIPITOR) 20 mg tablet Take 1 tablet (20 mg total) by mouth daily 30 tablet 5    DULoxetine (CYMBALTA) 60 mg delayed release capsule Take 1 capsule (60 mg total) by mouth daily 30 capsule 5    fentaNYL (DURAGESIC) 100 mcg/hr TD 72 hr patch Place 1 patch on the skin every third dayMax Daily Amount: 1 patch 10 patch 0    fentaNYL (DURAGESIC) 50 mcg/hr Place 1 patch on the skin every third dayMax Daily Amount: 1 patch 10 patch 0    gabapentin (NEURONTIN) 400 mg capsule Take 1 capsule (400 mg total) by mouth 3 (three) times a day 90 capsule 1     No current facility-administered medications for this visit        Current Outpatient Medications on File Prior to Visit   Medication Sig    atorvastatin (LIPITOR) 20 mg tablet Take 1 tablet (20 mg total) by mouth daily    DULoxetine (CYMBALTA) 60 mg delayed release capsule Take 1 capsule (60 mg total) by mouth daily    fentaNYL (DURAGESIC) 100 mcg/hr TD 72 hr patch Place 1 patch on the skin every third dayMax Daily Amount: 1 patch    fentaNYL (DURAGESIC) 50 mcg/hr Place 1 patch on the skin every third dayMax Daily Amount: 1 patch    gabapentin (NEURONTIN) 400 mg capsule Take 1 capsule (400 mg total) by mouth 3 (three) times a day    [DISCONTINUED] naproxen (NAPROSYN) 500 mg tablet Take 1 tablet by mouth every 12 (twelve) hours as needed     No current facility-administered medications on file prior to visit  He has No Known Allergies       Review of Systems   Constitutional: Positive for fatigue  HENT: Negative  Eyes: Negative  Respiratory: Negative  Cardiovascular: Negative  Gastrointestinal: Negative  Endocrine: Negative  Genitourinary: Negative  Musculoskeletal: Positive for back pain (center to right Lower back pain radiating down back of right leg into toes)  Skin: Negative  Allergic/Immunologic: Negative  Neurological: Positive for weakness (right leg) and numbness (back of right leg )  Hematological: Negative  Psychiatric/Behavioral: Negative  Objective:      /70 (BP Location: Left arm)   Pulse 65   Temp 97 9 °F (36 6 °C) (Tympanic)   Resp 16   Ht 5' 8" (1 727 m)   Wt 76 7 kg (169 lb)   BMI 25 70 kg/m²           Physical Exam   Constitutional: He is oriented to person, place, and time  He appears well-developed and well-nourished  HENT:   Head: Normocephalic and atraumatic  Nose: Nose normal    Eyes: Pupils are equal, round, and reactive to light  Conjunctivae and EOM are normal  Right eye exhibits no discharge  Left eye exhibits no discharge  Neck: Normal range of motion  No tracheal deviation present  No thyromegaly present  Cardiovascular: Normal rate     Pulmonary/Chest: Effort normal and breath sounds normal  No stridor  No respiratory distress  Abdominal: Soft  Musculoskeletal: Normal range of motion  He exhibits no edema  Neurological: He is alert and oriented to person, place, and time  He has normal strength and normal reflexes  No cranial nerve deficit or sensory deficit  Skin: Skin is warm and dry  No rash noted  He is not diaphoretic  No erythema  Psychiatric: He has a normal mood and affect   His behavior is normal  Judgment and thought content normal

## 2019-07-29 NOTE — PROGRESS NOTES
Assessment/Plan:    No problem-specific Assessment & Plan notes found for this encounter  Patient is stable  Symptoms, as detailed in HPI, continue to significantly impact of patient's quality of life in daily activities  After carefully considering presentation, investigations, functional status and co-morbidities, the risk/benefit profile of surgical intervention is favorable  History, physical examination and diagnostic tests were reviewed and questions answered  Diagnosis, care plan and treatment options were discussed  The patient understand instructions and will follow up as directed  Patient with successful medtronic spinal cord stimulator trial with > 90% relief of right leg pain  We will proceed with percutaneous thoracic spinal cord stimulation implant with right buttock generator     Expected postoperative course, including activity restrictions, expected pain and postoperative medication were reviewed  Patient provided verbal consent to surgical procedure and signed consent form: Yes    We also discussed the risks and benefits of the procedure the risks being including: infection (~2%), neurologic injury (<1%), new pain, revisions surgery, failure to relieve pain, hardware issues  The benefits including relief of pain similar to trial  The patient stated understanding of the risks and benefits and agreed to proceed  IMAGING REVIEWED: MRI thoracic shows no limiting stenosis for implantation         Diagnoses and all orders for this visit:    Chronic pain syndrome  -     Case request operating room: INSERTION THORACIC DORSAL COLUMN SPINAL CORD STIMULATOR PERCUTANEOUS, RIGHT BUTTOCK; Standing  -     Case request operating room: INSERTION THORACIC DORSAL COLUMN SPINAL CORD STIMULATOR PERCUTANEOUS, RIGHT BUTTOCK    Postlaminectomy syndrome, lumbar region  -     Ambulatory referral to Neurosurgery  -     Case request operating room: Marimar Cormier 13 PERCUTANEOUS, RIGHT BUTTOCK; Standing  -     Case request operating room: INSERTION THORACIC DORSAL COLUMN SPINAL CORD STIMULATOR PERCUTANEOUS, RIGHT BUTTOCK    Other orders  -     Diet NPO; Sips with meds; Standing  -     Nursing Communication [facility] Interventions Implemented; Standing  -     Nursing Communication Use 2 CHG cloths, have the patient wash his/her body from the neck down or have staff wash entire body (from neck down) if patient is unable; Standing  -     Nursing Communication Swab both nares with Povidone-Iodine solution, EXCLUDE if patient has shellfish/Iodine allergy; Standing  -     chlorhexidine (PERIDEX) 0 12 % oral rinse 15 mL  -     Void on call to OR; Standing  -     Insert peripheral IV; Standing  -     ceFAZolin (ANCEF) 2,000 mg in dextrose 5 % 100 mL IVPB          Subjective:      Patient ID: Ivett Rico is a 46 y o  male  Patient is a 46year old male with symptoms of right lower extremity pain  Pain is severe and throbbing in quality  Pain distribution is right leg  Was seen by me in the past and I recommended spinal cord stimulation at that time  The patient underwent a successful medtronic spinal cord stimulation trial with greater than % relief of pain  They reported improvement in sleep and ambulation  They are ready to proceed with surgery  The following portions of the patient's history were reviewed and updated as appropriate:   He  has a past medical history of Anxiety and depression, Chronic back pain, Hypercholesterolemia, Hyperlipidemia, Mastitis, and Sinusitis    He   Patient Active Problem List    Diagnosis Date Noted    Recurrent major depressive disorder, in partial remission ([facility] [address] ) 02/21/2019    Postlaminectomy syndrome, lumbar region 11/30/2018    Lumbar foraminal stenosis 11/30/2018    Lumbar spondylosis 11/30/2018    Chronic pain syndrome 11/30/2018    Lumbar radiculopathy     Encounter for screening colonoscopy 08/06/2018    Screening for colon cancer 07/19/2018    Chest pain in adult 10/28/2017    Other hyperlipidemia 10/28/2017    Chronic low back pain 10/28/2017    Diaphoresis 10/28/2017     He  has a past surgical history that includes Back surgery; Back surgery; and pr colonoscopy flx dx w/collj spec when pfrmd (N/A, 8/13/2018)  His family history includes Diabetes in his mother; Hypertension in his family; Stroke in his father  He  reports that he has been smoking cigarettes  He has been smoking about 0 25 packs per day  He has never used smokeless tobacco  He reports that he does not drink alcohol or use drugs  Current Outpatient Medications   Medication Sig Dispense Refill    atorvastatin (LIPITOR) 20 mg tablet Take 1 tablet (20 mg total) by mouth daily 30 tablet 5    DULoxetine (CYMBALTA) 60 mg delayed release capsule Take 1 capsule (60 mg total) by mouth daily 30 capsule 5    fentaNYL (DURAGESIC) 100 mcg/hr TD 72 hr patch Place 1 patch on the skin every third dayMax Daily Amount: 1 patch 10 patch 0    fentaNYL (DURAGESIC) 50 mcg/hr Place 1 patch on the skin every third dayMax Daily Amount: 1 patch 10 patch 0    gabapentin (NEURONTIN) 400 mg capsule Take 1 capsule (400 mg total) by mouth 3 (three) times a day 90 capsule 1     No current facility-administered medications for this visit        Current Outpatient Medications on File Prior to Visit   Medication Sig    atorvastatin (LIPITOR) 20 mg tablet Take 1 tablet (20 mg total) by mouth daily    DULoxetine (CYMBALTA) 60 mg delayed release capsule Take 1 capsule (60 mg total) by mouth daily    fentaNYL (DURAGESIC) 100 mcg/hr TD 72 hr patch Place 1 patch on the skin every third dayMax Daily Amount: 1 patch    fentaNYL (DURAGESIC) 50 mcg/hr Place 1 patch on the skin every third dayMax Daily Amount: 1 patch    gabapentin (NEURONTIN) 400 mg capsule Take 1 capsule (400 mg total) by mouth 3 (three) times a day    [DISCONTINUED] naproxen (NAPROSYN) 500 mg tablet Take 1 tablet by mouth every 12 (twelve) hours as needed     No current facility-administered medications on file prior to visit  He has No Known Allergies       Review of Systems   Constitutional: Positive for fatigue  HENT: Negative  Eyes: Negative  Respiratory: Negative  Cardiovascular: Negative  Gastrointestinal: Negative  Endocrine: Negative  Genitourinary: Negative  Musculoskeletal: Positive for back pain (center to right Lower back pain radiating down back of right leg into toes)  Skin: Negative  Allergic/Immunologic: Negative  Neurological: Positive for weakness (right leg) and numbness (back of right leg )  Hematological: Negative  Psychiatric/Behavioral: Negative  Objective:      /70 (BP Location: Left arm)   Pulse 65   Temp 97 9 °F (36 6 °C) (Tympanic)   Resp 16   Ht 5' 8" (1 727 m)   Wt 76 7 kg (169 lb)   BMI 25 70 kg/m²          Physical Exam   Constitutional: He is oriented to person, place, and time  He appears well-developed and well-nourished  HENT:   Head: Normocephalic and atraumatic  Nose: Nose normal    Eyes: Pupils are equal, round, and reactive to light  Conjunctivae and EOM are normal  Right eye exhibits no discharge  Left eye exhibits no discharge  Neck: Normal range of motion  No tracheal deviation present  No thyromegaly present  Cardiovascular: Normal rate  Pulmonary/Chest: Effort normal and breath sounds normal  No stridor  No respiratory distress  Abdominal: Soft  Musculoskeletal: Normal range of motion  He exhibits no edema  Neurological: He is alert and oriented to person, place, and time  He has normal strength and normal reflexes  No cranial nerve deficit or sensory deficit  Skin: Skin is warm and dry  No rash noted  He is not diaphoretic  No erythema  Psychiatric: He has a normal mood and affect   His behavior is normal  Judgment and thought content normal

## 2019-08-02 ENCOUNTER — TELEPHONE (OUTPATIENT)
Dept: NEUROSURGERY | Facility: CLINIC | Age: 54
End: 2019-08-02

## 2019-08-02 NOTE — TELEPHONE ENCOUNTER
Signed surgical consent in the presence of surgeon after procedure explained: INSERTION THORACIC DORSAL COLUMN SPINAL CORD STIMULATOR PERCUTANEOUS, RIGHT BUTTOCK (Right Spine Thoracic)--08/13/19 1205    Assessment for comorbid medical conditions:   HO adverse response to general anesthesia:---denies   Surgical Procedures past 6 months:June 11 2019 ,   Cardiac:denies   Pulmonary: denies   Endocrine:  Denies   MISC/Oncology/hematology : denies   Skin intact//GI- (urostomy, colostomy, ileostomy, intermittent  Catheterization): denies   Personal history of venous thromboembolic disease: denies   Imagining: MRI Thoracici spine 8/20/2019 images in PACS viewed   Pain management:  Shubham Martin fentanyl 150 every 3 days  And Roxicodone 5 mg every 6 hours ----this Dr bess manage postoperative pain medication    Medications preoperative and postoperative (AC, Antiplatelet, ASA, NSAID, vitamins , dietary supplements , OTC) reports he was given hold list on Monday --reviewed in detail patient currently not taking medications  Requiring hold  Reports he will see PCP 2 days before surgery ---he is encouraged to schedule appointment with PCP ASAP, get labs before appointment for PCP to review  Discussed overview of surgical process from office appointment thru 6 weeks post op:--done with difficultly     Patient verbalized understanding, all questions were answered and contact information provided in the event future questions arise

## 2019-08-06 ENCOUNTER — TRANSCRIBE ORDERS (OUTPATIENT)
Dept: ADMINISTRATIVE | Facility: HOSPITAL | Age: 54
End: 2019-08-06

## 2019-08-06 ENCOUNTER — APPOINTMENT (OUTPATIENT)
Dept: LAB | Facility: HOSPITAL | Age: 54
End: 2019-08-06
Attending: NEUROLOGICAL SURGERY
Payer: MEDICARE

## 2019-08-06 DIAGNOSIS — M96.1 POSTLAMINECTOMY SYNDROME, LUMBAR REGION: ICD-10-CM

## 2019-08-06 DIAGNOSIS — G89.4 CHRONIC PAIN SYNDROME: ICD-10-CM

## 2019-08-06 DIAGNOSIS — G89.4 CHRONIC PAIN SYNDROME: Primary | ICD-10-CM

## 2019-08-06 LAB
ALBUMIN SERPL BCP-MCNC: 3.9 G/DL (ref 3–5.2)
ALP SERPL-CCNC: 55 U/L (ref 43–122)
ALT SERPL W P-5'-P-CCNC: 33 U/L (ref 9–52)
ANION GAP SERPL CALCULATED.3IONS-SCNC: 6 MMOL/L (ref 5–14)
AST SERPL W P-5'-P-CCNC: 27 U/L (ref 17–59)
BACTERIA UR QL AUTO: ABNORMAL /HPF
BILIRUB SERPL-MCNC: 0.7 MG/DL
BILIRUB UR QL STRIP: NEGATIVE
BUN SERPL-MCNC: 19 MG/DL (ref 5–25)
CALCIUM SERPL-MCNC: 9.3 MG/DL (ref 8.4–10.2)
CHLORIDE SERPL-SCNC: 103 MMOL/L (ref 97–108)
CLARITY UR: CLEAR
CO2 SERPL-SCNC: 30 MMOL/L (ref 22–30)
COLOR UR: ABNORMAL
CREAT SERPL-MCNC: 0.77 MG/DL (ref 0.7–1.5)
EOSINOPHIL # BLD AUTO: 0.95 THOUSAND/UL (ref 0–0.4)
EOSINOPHIL NFR BLD MANUAL: 13 % (ref 0–6)
ERYTHROCYTE [DISTWIDTH] IN BLOOD BY AUTOMATED COUNT: 13.7 %
EST. AVERAGE GLUCOSE BLD GHB EST-MCNC: 100 MG/DL
GFR SERPL CREATININE-BSD FRML MDRD: 103 ML/MIN/1.73SQ M
GLUCOSE P FAST SERPL-MCNC: 79 MG/DL (ref 70–99)
GLUCOSE UR STRIP-MCNC: NEGATIVE MG/DL
HBA1C MFR BLD: 5.1 % (ref 4.2–6.3)
HCT VFR BLD AUTO: 45.1 % (ref 41–53)
HGB BLD-MCNC: 15 G/DL (ref 13.5–17.5)
HGB UR QL STRIP.AUTO: 10
INR PPP: 0.97 (ref 0.89–1.1)
KETONES UR STRIP-MCNC: NEGATIVE MG/DL
LEUKOCYTE ESTERASE UR QL STRIP: NEGATIVE
LYMPHOCYTES # BLD AUTO: 2.85 THOUSAND/UL (ref 0.5–4)
LYMPHOCYTES # BLD AUTO: 39 % (ref 25–45)
MCH RBC QN AUTO: 30 PG (ref 26–34)
MCHC RBC AUTO-ENTMCNC: 33.2 G/DL (ref 31–36)
MCV RBC AUTO: 90 FL (ref 80–100)
MONOCYTES # BLD AUTO: 0.44 THOUSAND/UL (ref 0.2–0.9)
MONOCYTES NFR BLD AUTO: 6 % (ref 1–10)
NEUTS SEG # BLD: 3.07 THOUSAND/UL (ref 1.8–7.8)
NEUTS SEG NFR BLD AUTO: 42 %
NITRITE UR QL STRIP: NEGATIVE
NON-SQ EPI CELLS URNS QL MICRO: ABNORMAL /HPF
PH UR STRIP.AUTO: 5 [PH]
PLATELET # BLD AUTO: 175 THOUSANDS/UL (ref 150–450)
PLATELET BLD QL SMEAR: ADEQUATE
PMV BLD AUTO: 9.2 FL (ref 8.9–12.7)
POTASSIUM SERPL-SCNC: 3.9 MMOL/L (ref 3.6–5)
PROT SERPL-MCNC: 6.7 G/DL (ref 5.9–8.4)
PROT UR STRIP-MCNC: NEGATIVE MG/DL
PROTHROMBIN TIME: 10.3 SECONDS (ref 9.5–11.6)
RBC # BLD AUTO: 4.99 MILLION/UL (ref 4.5–5.9)
RBC #/AREA URNS AUTO: ABNORMAL /HPF
RBC MORPH BLD: NORMAL
SODIUM SERPL-SCNC: 139 MMOL/L (ref 137–147)
SP GR UR STRIP.AUTO: 1.02 (ref 1–1.04)
TOTAL CELLS COUNTED SPEC: 100
UROBILINOGEN UA: NEGATIVE MG/DL
WBC # BLD AUTO: 7.3 THOUSAND/UL (ref 4.5–11)
WBC #/AREA URNS AUTO: ABNORMAL /HPF

## 2019-08-06 PROCEDURE — 85027 COMPLETE CBC AUTOMATED: CPT

## 2019-08-06 PROCEDURE — 83036 HEMOGLOBIN GLYCOSYLATED A1C: CPT

## 2019-08-06 PROCEDURE — 80053 COMPREHEN METABOLIC PANEL: CPT

## 2019-08-06 PROCEDURE — 81003 URINALYSIS AUTO W/O SCOPE: CPT | Performed by: NEUROLOGICAL SURGERY

## 2019-08-06 PROCEDURE — 85007 BL SMEAR W/DIFF WBC COUNT: CPT

## 2019-08-06 PROCEDURE — 36415 COLL VENOUS BLD VENIPUNCTURE: CPT

## 2019-08-06 PROCEDURE — 85610 PROTHROMBIN TIME: CPT

## 2019-08-06 PROCEDURE — 81001 URINALYSIS AUTO W/SCOPE: CPT | Performed by: NEUROLOGICAL SURGERY

## 2019-08-08 RX ORDER — ACETAMINOPHEN 500 MG
500 TABLET ORAL EVERY 6 HOURS PRN
COMMUNITY

## 2019-08-08 NOTE — PRE-PROCEDURE INSTRUCTIONS
Pre-Surgery Instructions:   Medication Instructions    acetaminophen (TYLENOL) 500 mg tablet Instructed patient per Anesthesia Guidelines   atorvastatin (LIPITOR) 20 mg tablet Instructed patient per Anesthesia Guidelines   fentaNYL (DURAGESIC) 100 mcg/hr TD 72 hr patch Instructed patient per Anesthesia Guidelines   fentaNYL (DURAGESIC) 50 mcg/hr Instructed patient per Anesthesia Guidelines   gabapentin (NEURONTIN) 400 mg capsule Instructed patient per Anesthesia Guidelines  Before your operation, you play an important role in decreasing your risk for infection by washing with special antiseptic soap  This is an effective way to reduce bacteria on the skin which may help to prevent infections at the surgical site  Please read the following directions in advance  1  In the week before your operation purchase a 4 ounce bottle of antiseptic soap containing chlorhexidine gluconate 4%  Some brand names include: Aplicare, Endure, and Hibiclens  The cost is usually less than $5 00  · For your convenience, the [address] [place] [address] carries the soap  · It may also be available at your doctor's office or pre-admission testing center, and at most retail pharmacies  · If you are allergic or sensitive to soaps containing chlorhexidine gluconate (CHG), please let your doctor know so another antiseptic soap can be suggested  · CHG antiseptic soap is for external use only  2      The day before your operation follow these directions carefully to get ready  · Place clean lines (sheets) on your bed; you should sleep on clean sheets after your evening shower  · Get clean towels and washcloths ready - you need enough for 2 showers  · Set aside clean underwear, pajamas, and clothing to wear after the shower  Reminders:  · DO NOT use any other soap or body rinse on your skin during or after the antiseptic showers    · DO NOT use lotion , powder, deodorant, or perfume/aftershave of any kind on your skin after your antiseptic shower  · DO NOT shave any body parts in the 24 hours/the day before your operation  · DO NOT get the antiseptic soap in your eyes, ears, nose, mouth, or vaginal area  3      You will need to shower the night before AND the morning of your Surgery  Shower 1:  · The evening before your operation, take the fist shower  · First, shampoo your hair with regular shampoo and rinse it completely before you use the anitseptic soap  After washing and rinsing your hair, rinse your body  · Next, use a clean wash cloth to apply the antiseptic soap and wash your body from the neck down to your toes using 1/2 bottle of the antiseptic soap  You will use the other 1/2 bottle for the second shower  · Clean the area where your incision will be; later this area well for about 2 minutes  · If you ar having head or neck surgery, wash areas with the antiseptic soap  · Rinse yourself completely with running water  · Use a clean towel to dry off  · Wear clean underwear and clothing/pajamas  Shower 2:  · The Morning of your operation, take the second shower following the same steps as Shower 1 using the second 1/2 of the bottle of antiseptic soap  · Use clean cloths and towels to was and dry yourself off  · Wear clean underwear and clothing

## 2019-08-12 ENCOUNTER — DOCUMENTATION (OUTPATIENT)
Dept: NEUROSURGERY | Facility: CLINIC | Age: 54
End: 2019-08-12

## 2019-08-12 ENCOUNTER — OFFICE VISIT (OUTPATIENT)
Dept: FAMILY MEDICINE CLINIC | Facility: CLINIC | Age: 54
End: 2019-08-12
Payer: MEDICARE

## 2019-08-12 ENCOUNTER — TELEPHONE (OUTPATIENT)
Dept: NEUROSURGERY | Facility: CLINIC | Age: 54
End: 2019-08-12

## 2019-08-12 VITALS
RESPIRATION RATE: 16 BRPM | HEIGHT: 68 IN | TEMPERATURE: 97.7 F | WEIGHT: 167 LBS | DIASTOLIC BLOOD PRESSURE: 78 MMHG | OXYGEN SATURATION: 97 % | BODY MASS INDEX: 25.31 KG/M2 | HEART RATE: 66 BPM | SYSTOLIC BLOOD PRESSURE: 128 MMHG

## 2019-08-12 DIAGNOSIS — M96.1 POSTLAMINECTOMY SYNDROME, LUMBAR REGION: ICD-10-CM

## 2019-08-12 DIAGNOSIS — Z01.810 PREOPERATIVE CARDIOVASCULAR EXAMINATION: ICD-10-CM

## 2019-08-12 DIAGNOSIS — G89.4 CHRONIC PAIN SYNDROME: Primary | ICD-10-CM

## 2019-08-12 DIAGNOSIS — Z98.890 POSTOPERATIVE STATE: Primary | ICD-10-CM

## 2019-08-12 PROCEDURE — 99215 OFFICE O/P EST HI 40 MIN: CPT | Performed by: FAMILY MEDICINE

## 2019-08-12 PROCEDURE — 93000 ELECTROCARDIOGRAM COMPLETE: CPT | Performed by: FAMILY MEDICINE

## 2019-08-12 RX ORDER — OXYCODONE HYDROCHLORIDE 5 MG/1
5 TABLET ORAL EVERY 6 HOURS PRN
Qty: 25 TABLET | Refills: 0 | Status: SHIPPED | OUTPATIENT
Start: 2019-08-12

## 2019-08-12 RX ORDER — CEPHALEXIN 500 MG/1
500 CAPSULE ORAL EVERY 6 HOURS SCHEDULED
Qty: 12 CAPSULE | Refills: 0 | Status: SHIPPED | OUTPATIENT
Start: 2019-08-12 | End: 2019-08-15

## 2019-08-12 NOTE — TELEPHONE ENCOUNTER
Pre operative call day prior surgery scheduled in the AM w/ Dr Vincent Jenkins, RIGHT BUTTOCK (Right Spine Thoracic) 8/13   Discussion/Review    Allergies ---Reviewed   Hold medications --- Reviewed in detail again  NPO after MN, night prior surgery ---Reviewed  Medication (s) instructed by healthcare provider to take the morning of surgery w/ sip of water 4 OZ discussed: as per ASU   Post operative scripts electronic transmission: cephalexin   PDMP site reviewed accessed and reviewed scheduled drug list printed and scanned into record--done     Pain management script:none required   Chronic opiate use Dr Dr Gee Nichole [facility] fentanyl 150 every 3 days  And Roxicodone 5 mg every 6 hours ----this Dr will manage postoperative pain medication      Pre- operative shower protocol reviewed; Clarify instructions as per protocol, third chlorhexidine shower tonight before surgery, then use DARIANA wipes as per packaging instructions, Use a clean towel and wash cloth starting tonight and continue nightly until seen 2 weeks post operative visit for incision check removal  Change bed linens tonight and continue at least 1-2 times weekly  --reinforced     Informed will receive a telephone call tonight from a hospital representative with time to report on surgery day: reinforced     Informed will receive a f/u call within in 24 -48 hours post-op to assess recovery reinforce instructions, and to answer any questions  Reinforced     Follow-up appointments reviewed 8/27/2019 9/27/2019     Patient verbalized understanding information provided /discussed

## 2019-08-12 NOTE — PROGRESS NOTES
Assessment/Plan:    No problem-specific Assessment & Plan notes found for this encounter  Diagnoses and all orders for this visit:    Chronic pain syndrome  Comments:  Chidi is stable on exam   He is cleared medically for his surgery tomorrow with Neurosurgery  PAT labs and ECG (done today), reassuring  Orders:  -     POCT ECG  -     oxyCODONE (ROXICODONE) 5 mg immediate release tablet; Take 1 tablet (5 mg total) by mouth every 6 (six) hours as needed for moderate pain or severe painMax Daily Amount: 20 mg    Postlaminectomy syndrome, lumbar region  Comments:  Lengthy discussion today about lauren management post-surgery, and precautions given  F/u in 3 weeks - will discuss further taper off Fentanyl at that time  Orders:  -     POCT ECG  -     oxyCODONE (ROXICODONE) 5 mg immediate release tablet; Take 1 tablet (5 mg total) by mouth every 6 (six) hours as needed for moderate pain or severe painMax Daily Amount: 20 mg    Preoperative cardiovascular examination  -     POCT ECG          Subjective:      Patient ID: Larissa Blackwell is a 46 y o  male  Vaibhav Mas presents today for a pre-op exam -> Scheduled for percutaneous insertion of a thoracic dorsal column spinal cord stimulator (right buttock), tomorrow (8/13/19), with Dr Rustam Moran of Neurosurgery  Had successful neurostimulator trial   Hx of 1 back surgery in the past     Pt can can 4 city blocks / climb 2 flghts of stairs without CP/SOB  Pt STOPPED smoking 2 days ago for his surgery  Pre-op labs:  A1c 5 1, CBC normal, UA - 1-2 RBCs seen, Gluc 79, Cr/GFR 0 77/103, LFTs normal, PT 10 3, INR 0 97  We discussed pain management post procedure -> pt to try cutting back to 100mcg Fentanyl in patches post-procedure, and only taking Oxycodone PRN (discussed at length today; precautions given)  Hopefully then, he can start weaning off Fentanyl all together          The following portions of the patient's history were reviewed and updated as appropriate: allergies, current medications, past family history, past social history, past surgical history and problem list     Past Medical History:   Diagnosis Date    Anxiety and depression     Chronic back pain     Chronic pain disorder     Hypercholesterolemia     last assessed 7/19/17    Hyperlipidemia     Mastitis     Sinusitis      Past Surgical History:   Procedure Laterality Date    BACK SURGERY      back sx for fx + disc rupture w/ hardware    BACK SURGERY      lower    COLONOSCOPY      MO COLONOSCOPY FLX DX W/COLLJ SPEC WHEN PFRMD N/A 8/13/2018    Procedure: COLONOSCOPY;  Surgeon: Anaid Pichardo MD;  [facility];   Service: Gastroenterology    SPINAL CORD STIMULATOR TRIAL W/ LAMINOTOMY         Current Outpatient Medications:     acetaminophen (TYLENOL) 500 mg tablet, Take 500 mg by mouth every 6 (six) hours as needed for mild pain, Disp: , Rfl:     atorvastatin (LIPITOR) 20 mg tablet, Take 1 tablet (20 mg total) by mouth daily, Disp: 30 tablet, Rfl: 5    fentaNYL (DURAGESIC) 100 mcg/hr TD 72 hr patch, Place 1 patch on the skin every third dayMax Daily Amount: 1 patch, Disp: 10 patch, Rfl: 0    fentaNYL (DURAGESIC) 50 mcg/hr, Place 1 patch on the skin every third dayMax Daily Amount: 1 patch, Disp: 10 patch, Rfl: 0    gabapentin (NEURONTIN) 400 mg capsule, Take 1 capsule (400 mg total) by mouth 3 (three) times a day, Disp: 90 capsule, Rfl: 1    cephalexin (KEFLEX) 500 mg capsule, Take 1 capsule (500 mg total) by mouth every 6 (six) hours for 3 days Start 8/12 after surgery, Disp: 12 capsule, Rfl: 0    oxyCODONE (ROXICODONE) 5 mg immediate release tablet, Take 1 tablet (5 mg total) by mouth every 6 (six) hours as needed for moderate pain or severe painMax Daily Amount: 20 mg, Disp: 25 tablet, Rfl: 0    No Known Allergies    Family History   Problem Relation Age of Onset    Diabetes Mother         mellitus    Stroke Father         syndrome    Hypertension Family      Social History Tobacco Use    Smoking status: Current Every Day Smoker     Packs/day: 0 25     Types: Cigarettes    Smokeless tobacco: Never Used    Tobacco comment: encouraged smoking cessation   Substance Use Topics    Alcohol use: No    Drug use: No       Review of Systems   Constitutional: Negative for activity change and fever  Respiratory: Negative for shortness of breath  Cardiovascular: Negative for chest pain  Gastrointestinal: Negative for abdominal pain and blood in stool  Musculoskeletal: Positive for back pain  Objective:      /78   Pulse 66   Temp 97 7 °F (36 5 °C)   Resp 16   Ht 5' 8 31" (1 735 m)   Wt 75 8 kg (167 lb)   SpO2 97%   BMI 25 16 kg/m²          Physical Exam   Constitutional: He is oriented to person, place, and time  He appears well-developed and well-nourished  No distress  HENT:   Head: Normocephalic and atraumatic  Mouth/Throat: Oropharynx is clear and moist  No oropharyngeal exudate  Eyes: Conjunctivae are normal    Neck: Normal range of motion  Neck supple  No thyromegaly present  Cardiovascular: Normal rate, regular rhythm and normal heart sounds  Exam reveals no gallop and no friction rub  No murmur heard  Pulmonary/Chest: Effort normal and breath sounds normal  No stridor  No respiratory distress  He has no wheezes  He has no rales  Abdominal: Soft  Bowel sounds are normal  He exhibits no distension and no mass  There is no tenderness  There is no rebound and no guarding  Musculoskeletal:        Back:    Lymphadenopathy:     He has no cervical adenopathy  Neurological: He is alert and oriented to person, place, and time  Skin: He is not diaphoretic  Psychiatric: He has a normal mood and affect  His behavior is normal  Judgment and thought content normal    Nursing note and vitals reviewed

## 2019-08-13 ENCOUNTER — ANESTHESIA (OUTPATIENT)
Dept: PERIOP | Facility: HOSPITAL | Age: 54
End: 2019-08-13
Payer: MEDICARE

## 2019-08-13 ENCOUNTER — ANESTHESIA EVENT (OUTPATIENT)
Dept: PERIOP | Facility: HOSPITAL | Age: 54
End: 2019-08-13
Payer: MEDICARE

## 2019-08-13 ENCOUNTER — APPOINTMENT (OUTPATIENT)
Dept: RADIOLOGY | Facility: HOSPITAL | Age: 54
End: 2019-08-13
Payer: MEDICARE

## 2019-08-13 ENCOUNTER — HOSPITAL ENCOUNTER (OUTPATIENT)
Facility: HOSPITAL | Age: 54
Setting detail: OUTPATIENT SURGERY
Discharge: HOME | End: 2019-08-13
Attending: NEUROLOGICAL SURGERY | Admitting: NEUROLOGICAL SURGERY
Payer: MEDICARE

## 2019-08-13 VITALS
WEIGHT: 165.5 LBS | HEIGHT: 68 IN | DIASTOLIC BLOOD PRESSURE: 75 MMHG | HEART RATE: 61 BPM | TEMPERATURE: 97.6 F | BODY MASS INDEX: 25.08 KG/M2 | RESPIRATION RATE: 16 BRPM | SYSTOLIC BLOOD PRESSURE: 155 MMHG | OXYGEN SATURATION: 98 %

## 2019-08-13 PROCEDURE — 95972 ALYS CPLX SP/PN NPGT W/PRGRM: CPT | Performed by: NEUROLOGICAL SURGERY

## 2019-08-13 PROCEDURE — 63650 IMPLANT NEUROELECTRODES: CPT | Performed by: NEUROLOGICAL SURGERY

## 2019-08-13 PROCEDURE — 63685 INS/RPLC SPI NPG/RCVR POCKET: CPT | Performed by: PHYSICIAN ASSISTANT

## 2019-08-13 PROCEDURE — 63685 INS/RPLC SPI NPG/RCVR POCKET: CPT | Performed by: NEUROLOGICAL SURGERY

## 2019-08-13 PROCEDURE — C1787 PATIENT PROGR, NEUROSTIM: HCPCS | Performed by: NEUROLOGICAL SURGERY

## 2019-08-13 PROCEDURE — 72070 X-RAY EXAM THORAC SPINE 2VWS: CPT

## 2019-08-13 PROCEDURE — C1778 LEAD, NEUROSTIMULATOR: HCPCS | Performed by: NEUROLOGICAL SURGERY

## 2019-08-13 PROCEDURE — C1820 GENERATOR NEURO RECHG BAT SY: HCPCS | Performed by: NEUROLOGICAL SURGERY

## 2019-08-13 PROCEDURE — 63650 IMPLANT NEUROELECTRODES: CPT | Performed by: PHYSICIAN ASSISTANT

## 2019-08-13 DEVICE — LEAD 977A260 VECTRIS MRICS SUBCOMPACT
Type: IMPLANTABLE DEVICE | Status: FUNCTIONAL
Brand: VECTRIS™ SURESCAN®

## 2019-08-13 DEVICE — INS 97715 INTELLIS SENSOR MRI US EMANUAL
Type: IMPLANTABLE DEVICE | Status: FUNCTIONAL
Brand: INTELLIS™ ADAPTIVESTIM®

## 2019-08-13 RX ORDER — SODIUM CHLORIDE, SODIUM LACTATE, POTASSIUM CHLORIDE, CALCIUM CHLORIDE 600; 310; 30; 20 MG/100ML; MG/100ML; MG/100ML; MG/100ML
75 INJECTION, SOLUTION INTRAVENOUS CONTINUOUS
Status: DISCONTINUED | OUTPATIENT
Start: 2019-08-13 | End: 2019-08-13 | Stop reason: HOSPADM

## 2019-08-13 RX ORDER — HYDROMORPHONE HCL/PF 1 MG/ML
0.5 SYRINGE (ML) INJECTION
Status: DISCONTINUED | OUTPATIENT
Start: 2019-08-13 | End: 2019-08-13 | Stop reason: HOSPADM

## 2019-08-13 RX ORDER — SUCCINYLCHOLINE/SOD CL,ISO/PF 100 MG/5ML
SYRINGE (ML) INTRAVENOUS AS NEEDED
Status: DISCONTINUED | OUTPATIENT
Start: 2019-08-13 | End: 2019-08-13 | Stop reason: SURG

## 2019-08-13 RX ORDER — FENTANYL CITRATE 50 UG/ML
INJECTION, SOLUTION INTRAMUSCULAR; INTRAVENOUS AS NEEDED
Status: DISCONTINUED | OUTPATIENT
Start: 2019-08-13 | End: 2019-08-13 | Stop reason: SURG

## 2019-08-13 RX ORDER — PROPOFOL 10 MG/ML
INJECTION, EMULSION INTRAVENOUS AS NEEDED
Status: DISCONTINUED | OUTPATIENT
Start: 2019-08-13 | End: 2019-08-13 | Stop reason: SURG

## 2019-08-13 RX ORDER — CHLORHEXIDINE GLUCONATE 0.12 MG/ML
15 RINSE ORAL ONCE
Status: COMPLETED | OUTPATIENT
Start: 2019-08-13 | End: 2019-08-13

## 2019-08-13 RX ORDER — BUPIVACAINE HYDROCHLORIDE 2.5 MG/ML
INJECTION, SOLUTION INFILTRATION; PERINEURAL AS NEEDED
Status: DISCONTINUED | OUTPATIENT
Start: 2019-08-13 | End: 2019-08-13 | Stop reason: HOSPADM

## 2019-08-13 RX ORDER — METOCLOPRAMIDE HYDROCHLORIDE 5 MG/ML
10 INJECTION INTRAMUSCULAR; INTRAVENOUS ONCE AS NEEDED
Status: DISCONTINUED | OUTPATIENT
Start: 2019-08-13 | End: 2019-08-13 | Stop reason: HOSPADM

## 2019-08-13 RX ORDER — ROCURONIUM BROMIDE 10 MG/ML
INJECTION, SOLUTION INTRAVENOUS AS NEEDED
Status: DISCONTINUED | OUTPATIENT
Start: 2019-08-13 | End: 2019-08-13 | Stop reason: SURG

## 2019-08-13 RX ORDER — KETOROLAC TROMETHAMINE 30 MG/ML
INJECTION, SOLUTION INTRAMUSCULAR; INTRAVENOUS AS NEEDED
Status: DISCONTINUED | OUTPATIENT
Start: 2019-08-13 | End: 2019-08-13 | Stop reason: SURG

## 2019-08-13 RX ORDER — OXYCODONE HYDROCHLORIDE AND ACETAMINOPHEN 5; 325 MG/1; MG/1
2 TABLET ORAL EVERY 4 HOURS PRN
Status: DISCONTINUED | OUTPATIENT
Start: 2019-08-13 | End: 2019-08-13 | Stop reason: HOSPADM

## 2019-08-13 RX ORDER — GLYCOPYRROLATE 0.2 MG/ML
INJECTION INTRAMUSCULAR; INTRAVENOUS AS NEEDED
Status: DISCONTINUED | OUTPATIENT
Start: 2019-08-13 | End: 2019-08-13 | Stop reason: SURG

## 2019-08-13 RX ORDER — ONDANSETRON 2 MG/ML
INJECTION INTRAMUSCULAR; INTRAVENOUS AS NEEDED
Status: DISCONTINUED | OUTPATIENT
Start: 2019-08-13 | End: 2019-08-13 | Stop reason: SURG

## 2019-08-13 RX ORDER — FENTANYL CITRATE/PF 50 MCG/ML
50 SYRINGE (ML) INJECTION
Status: DISCONTINUED | OUTPATIENT
Start: 2019-08-13 | End: 2019-08-13 | Stop reason: HOSPADM

## 2019-08-13 RX ORDER — PROPOFOL 10 MG/ML
INJECTION, EMULSION INTRAVENOUS CONTINUOUS PRN
Status: DISCONTINUED | OUTPATIENT
Start: 2019-08-13 | End: 2019-08-13 | Stop reason: SURG

## 2019-08-13 RX ORDER — CEFAZOLIN SODIUM 2 G/50ML
2000 SOLUTION INTRAVENOUS ONCE
Status: DISCONTINUED | OUTPATIENT
Start: 2019-08-13 | End: 2019-08-13 | Stop reason: HOSPADM

## 2019-08-13 RX ORDER — MIDAZOLAM HYDROCHLORIDE 1 MG/ML
INJECTION INTRAMUSCULAR; INTRAVENOUS AS NEEDED
Status: DISCONTINUED | OUTPATIENT
Start: 2019-08-13 | End: 2019-08-13 | Stop reason: SURG

## 2019-08-13 RX ORDER — DEXAMETHASONE SODIUM PHOSPHATE 10 MG/ML
INJECTION, SOLUTION INTRAMUSCULAR; INTRAVENOUS AS NEEDED
Status: DISCONTINUED | OUTPATIENT
Start: 2019-08-13 | End: 2019-08-13 | Stop reason: SURG

## 2019-08-13 RX ORDER — LIDOCAINE HYDROCHLORIDE AND EPINEPHRINE 10; 10 MG/ML; UG/ML
INJECTION, SOLUTION INFILTRATION; PERINEURAL AS NEEDED
Status: DISCONTINUED | OUTPATIENT
Start: 2019-08-13 | End: 2019-08-13 | Stop reason: HOSPADM

## 2019-08-13 RX ADMIN — PROPOFOL 200 MG: 10 INJECTION, EMULSION INTRAVENOUS at 11:49

## 2019-08-13 RX ADMIN — FENTANYL CITRATE 25 MCG: 50 INJECTION, SOLUTION INTRAMUSCULAR; INTRAVENOUS at 12:40

## 2019-08-13 RX ADMIN — FENTANYL CITRATE 50 MCG: 50 INJECTION, SOLUTION INTRAMUSCULAR; INTRAVENOUS at 12:21

## 2019-08-13 RX ADMIN — FENTANYL CITRATE 50 MCG: 50 INJECTION, SOLUTION INTRAMUSCULAR; INTRAVENOUS at 14:15

## 2019-08-13 RX ADMIN — FENTANYL CITRATE 25 MCG: 50 INJECTION, SOLUTION INTRAMUSCULAR; INTRAVENOUS at 12:52

## 2019-08-13 RX ADMIN — CHLORHEXIDINE GLUCONATE 0.12% ORAL RINSE 15 ML: 1.2 LIQUID ORAL at 10:12

## 2019-08-13 RX ADMIN — ROCURONIUM BROMIDE 10 MG: 10 INJECTION, SOLUTION INTRAVENOUS at 11:49

## 2019-08-13 RX ADMIN — PROPOFOL 120 MCG/KG/MIN: 10 INJECTION, EMULSION INTRAVENOUS at 12:06

## 2019-08-13 RX ADMIN — FENTANYL CITRATE 50 MCG: 50 INJECTION, SOLUTION INTRAMUSCULAR; INTRAVENOUS at 12:13

## 2019-08-13 RX ADMIN — MIDAZOLAM 2 MG: 1 INJECTION INTRAMUSCULAR; INTRAVENOUS at 11:39

## 2019-08-13 RX ADMIN — SODIUM CHLORIDE, SODIUM LACTATE, POTASSIUM CHLORIDE, AND CALCIUM CHLORIDE 75 ML/HR: .6; .31; .03; .02 INJECTION, SOLUTION INTRAVENOUS at 10:12

## 2019-08-13 RX ADMIN — FENTANYL CITRATE 50 MCG: 50 INJECTION, SOLUTION INTRAMUSCULAR; INTRAVENOUS at 13:42

## 2019-08-13 RX ADMIN — Medication 100 MG: at 11:49

## 2019-08-13 RX ADMIN — FENTANYL CITRATE 50 MCG: 50 INJECTION, SOLUTION INTRAMUSCULAR; INTRAVENOUS at 11:39

## 2019-08-13 RX ADMIN — GLYCOPYRROLATE 0.2 MG: 0.2 INJECTION, SOLUTION INTRAMUSCULAR; INTRAVENOUS at 11:37

## 2019-08-13 RX ADMIN — KETOROLAC TROMETHAMINE 30 MG: 30 INJECTION, SOLUTION INTRAMUSCULAR; INTRAVENOUS at 13:17

## 2019-08-13 RX ADMIN — ONDANSETRON 4 MG: 2 INJECTION INTRAMUSCULAR; INTRAVENOUS at 12:55

## 2019-08-13 RX ADMIN — OXYCODONE HYDROCHLORIDE AND ACETAMINOPHEN 2 TABLET: 5; 325 TABLET ORAL at 14:59

## 2019-08-13 RX ADMIN — DEXAMETHASONE SODIUM PHOSPHATE 8 MG: 10 INJECTION, SOLUTION INTRAMUSCULAR; INTRAVENOUS at 12:26

## 2019-08-13 NOTE — DISCHARGE INSTRUCTIONS
Discharge Instructions  Spinal Cord Stimulator (SCS)    Activity:  1  Do not lift more than 10 pounds for 6 weeks  2  Avoid bending, lifting and twisting for 6 weeks  3  No strenuous activities  No driving for 2 weeks  4  When able to shower, continue to use clean towel and washcloth for 2 weeks post-op  5  Continue to change bed linens and pajamas more frequently  Wear clean clothes daily  Surgical incision care:  1  For Permanent SCS placement:  a  Keep incisions dry for 3 days  b  May shower with mild antimicrobial soap after 3 days  c  After 3 days, incisions may be left open to air, but must remain clean  2  Do not immerse the incisions in water for 6 weeks  3  Do not apply any creams or ointments to the incision for 6 weeks, unless otherwise instructed by [facility]  4  Contact office if increasing redness, drainage, pain or swelling around the incisions  Postoperative medication:  1  Complete course of antibiotic as directed  a  For permanent spinal cord stimulators: 3 days  2  [address] will provide pain medication as coordinated with your pain specialist  All prescriptions must come from a single provider  a  Take all medications as prescribed  Call office with any questions/concerns  3  Please contact office for questions regarding dosage and modifications  4  No antiplatelet, anticoagulation or Nonsteroidal anti-inflammatory (NSAIDs) medication until cleared by [address], unless otherwise instructed  5  Do not operate heavy machinery or vehicles while taking sedating medications  6  Use a bowel regimen while on opioids as they induce constipation  Ie  Senokot-S, Miralax, Colace, etc  Increase fiber and water intake  ***Note that it may take some time for the stimulator to improve chronic pain symptoms  Adjustment of the stimulator program can begin 2 weeks after placement   For TRIALS, there will be ongoing communication with the rep and adjustments as indicated  Please contact the stimulator representative if you have any questions regarding programing  ***

## 2019-08-13 NOTE — ANESTHESIA PREPROCEDURE EVALUATION
Review of Systems/Medical History  Patient summary reviewed  Chart reviewed      Cardiovascular  EKG reviewed, Hyperlipidemia,    Pulmonary  Negative pulmonary ROS        GI/Hepatic  Negative GI/hepatic ROS          Negative  ROS        Endo/Other  Negative endo/other ROS      GYN       Hematology  Negative hematology ROS      Musculoskeletal  Back pain , lumbar pain, Osteoarthritis,   Arthritis     Neurology  Negative neurology ROS      Psychology   Anxiety, Depression , depressed and being treated for depression,   Chronic opioid dependence Chronic pain,            Physical Exam    Airway    Mallampati score: I  TM Distance: >3 FB  Neck ROM: full     Dental   No notable dental hx     Cardiovascular  Cardiovascular exam normal    Pulmonary  Pulmonary exam normal     Other Findings        Anesthesia Plan  ASA Score- 2     Anesthesia Type- general with ASA Monitors  Additional Monitors:   Airway Plan: ETT  Plan Factors-    Induction- intravenous  Postoperative Plan- Plan for postoperative opioid use  Informed Consent- Anesthetic plan and risks discussed with patient  I personally reviewed this patient with the CRNA  Discussed and agreed on the Anesthesia Plan with the [facility] Mike

## 2019-08-13 NOTE — ANESTHESIA POSTPROCEDURE EVALUATION
Post-Op Assessment Note    CV Status:  Stable  Pain Score: 8    Pain management: adequate     Mental Status:  Alert and awake   Hydration Status:  Euvolemic   PONV Controlled:  Controlled   Airway Patency:  Patent   Post Op Vitals Reviewed: Yes      Staff: CRNA           /69 (08/13/19 1329)    Temp 97 7 °F (36 5 °C) (08/13/19 1319)    Pulse 70 (08/13/19 1329)   Resp 12 (08/13/19 1329)    SpO2 98 % (08/13/19 1329)

## 2019-08-13 NOTE — OP NOTE
OPERATIVE REPORT  PATIENT NAME: Frederick Mayen    :  1965  MRN: [id number]  Pt Location: [facility] OR ROOM 01    SURGERY DATE: 2019    Surgeon(s) and Role:     * Gi Witt MD - Primary     * Maria Dolores Quintero PA-C - Assisting    Preop Diagnosis:  Chronic pain syndrome [G89 4]  Postlaminectomy syndrome, lumbar region [M96 1]    Post-Op Diagnosis Codes:     * Chronic pain syndrome [G89 4]     * Postlaminectomy syndrome, lumbar region [M96 1]    Procedure(s) (LRB):  INSERTION THORACIC DORSAL COLUMN SPINAL CORD STIMULATOR PERCUTANEOUS, RIGHT BUTTOCK (Right)    Specimen(s):  * No specimens in log *    Estimated Blood Loss:   Minimal    Drains:  * No LDAs found *    Anesthesia Type:   General    Operative Indications:  Chronic pain syndrome [G89 4]  Postlaminectomy syndrome, lumbar region [M96 1]      Operative Findings:  See dictated note    Complications:   None    Procedure and Technique:  The patient was taken to operative theater induced under general anesthesia and intubated he was positioned prone a Manny frame  The sweet spot during the trial was planned at T9-10  A right buttock incision was planned for the generator he was prepped and draped in sterile fashion  We used an epidmed needle to gain epidural access via loss of resistance technique  We traveled two electrodes through two different epidural access needles up to towards approximately to T8 but covering T9-10  We confirmed placement of the the lead using fluoro  We tested the EMG response by altering the following parameters using a neurostimulation device   We programmed the following:    Frequency: 10-60 hertz   Pulse width of 350 us  Amplitudes: 0-10 mA   Contacts: midline contacts alternating contacts on the 16 contact electrode  Configuration: Bipolar with (+) and (-)  Cycling: None  Waveform: Tonic    We obtained the appropriate EMG response on the right and left leg    The pocket on the right buttock was created with an knife and electrocautery  We tunneled that electrodes connected to the impulse generator and this was connected  We then tested impedances which were normal     The leads been anchored down using anchors under serial of fluoroscopy  After all hardware was in place we irrigated each wound  And then closed in layers using 2 0 Vicryl for the subcutaneous tissues and monocryl for the skin sterile dressing was placed  Patient was repositioned supine the hospital bed extubated to room air  he was taken to the postop recovery area stable condition  All needle and sponge counts were correct at the procedure  All neuromonitoring remained stable during the procedure      I was present for the entire procedure, A qualified resident physician was not available and A physician assistant was required during the procedure for retraction tissue handling,dissection and suturing    Patient Disposition:  PACU     SIGNATURE: Tato Collins MD  DATE: August 13, 2019  TIME: 12:58 PM    Medtronic [id number] lead kit 60 cm x2  Medtronic intellis generator [id number] - [id number]

## 2019-08-13 NOTE — INTERVAL H&P NOTE
H&P reviewed  After examining the patient I find no changes in the patients condition since the H&P had been written      Vitals:    08/13/19 1005   BP: 140/80   Pulse: 62   Resp: 16   Temp: 98 1 °F (36 7 °C)   SpO2: 97%

## 2019-08-14 ENCOUNTER — TELEPHONE (OUTPATIENT)
Dept: NEUROSURGERY | Facility: CLINIC | Age: 54
End: 2019-08-14

## 2019-08-14 NOTE — TELEPHONE ENCOUNTER
Pt is 1 day s/p SCS placement  He reports to be doing well, actually better than he expected at this point  Instructions were provided and reviewed at d/c, and he states an understanding  He denies c/o bowel or bladder  Current pain regime is effective for discomfort  He was encouraged to drink plenty of fluids and take a stool softener with narcotic meds to help with constipation  He may remove dressings and shower at 3 days post-op which he is aware of  Instructed to monitor incisions daily and to call with any s/s of infection  For temp greater than 101 call the office or present to the ED  Reviewed f/u appts  He offered no further questions or concerns at this time but will call with any in the future

## 2019-08-17 ENCOUNTER — HOSPITAL ENCOUNTER (EMERGENCY)
Facility: HOSPITAL | Age: 54
Discharge: HOME | End: 2019-08-17
Attending: EMERGENCY MEDICINE | Admitting: EMERGENCY MEDICINE
Payer: MEDICARE

## 2019-08-17 ENCOUNTER — APPOINTMENT (EMERGENCY)
Dept: RADIOLOGY | Facility: HOSPITAL | Age: 54
End: 2019-08-17
Payer: MEDICARE

## 2019-08-17 VITALS
TEMPERATURE: 98 F | BODY MASS INDEX: 25.81 KG/M2 | SYSTOLIC BLOOD PRESSURE: 175 MMHG | DIASTOLIC BLOOD PRESSURE: 69 MMHG | WEIGHT: 169.75 LBS | OXYGEN SATURATION: 99 % | HEART RATE: 74 BPM | RESPIRATION RATE: 16 BRPM

## 2019-08-17 DIAGNOSIS — S20.212A RIB CONTUSION, LEFT, INITIAL ENCOUNTER: ICD-10-CM
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
## 2019-08-17 PROCEDURE — 99283 EMERGENCY DEPT VISIT LOW MDM: CPT

## 2019-08-17 PROCEDURE — 72100 X-RAY EXAM L-S SPINE 2/3 VWS: CPT

## 2019-08-17 PROCEDURE — 99283 EMERGENCY DEPT VISIT LOW MDM: CPT | Performed by: PHYSICIAN ASSISTANT

## 2019-08-17 PROCEDURE — 71101 X-RAY EXAM UNILAT RIBS/CHEST: CPT

## 2019-08-17 NOTE — ED PROVIDER NOTES
History  Chief Complaint   Patient presents with    Rib Pain     states slipped in shower, fell, pain to left ribs and left arm  states had recent neuro-stimulator placement for back pain  54-year-old male with history of chronic pain syndrome status post day 4 spinal cord stimulator placement over the right buttock, presents for evaluation after a fall  Patient reports that he was taking a shower and slipped on water and fell towards the left side and injured his ribs  Patient also reports he fell over the right buttock  Patient denies hitting his head, loss of consciousness  Reports that he has pain under his armpit over his rib cage  Patient reports that he is on 150 mcg patch of fentanyl and has been taking oxycodone 5 mg status post surgery  Reports he still has pain  Reports pain with movement and deep breaths  Denies any shortness of breath, chest pain or difficulty breathing  Patient states that he did not notice any bleeding from his incision sites  Prior to Admission Medications   Prescriptions Last Dose Informant Patient Reported?  Taking?   acetaminophen (TYLENOL) 500 mg tablet   Yes No   Sig: Take 500 mg by mouth every 6 (six) hours as needed for mild pain   atorvastatin (LIPITOR) 20 mg tablet   No No   Sig: Take 1 tablet (20 mg total) by mouth daily   fentaNYL (DURAGESIC) 100 mcg/hr TD 72 hr patch   No No   Sig: Place 1 patch on the skin every third dayMax Daily Amount: 1 patch   fentaNYL (DURAGESIC) 50 mcg/hr   No No   Sig: Place 1 patch on the skin every third dayMax Daily Amount: 1 patch   gabapentin (NEURONTIN) 400 mg capsule   No No   Sig: Take 1 capsule (400 mg total) by mouth 3 (three) times a day   oxyCODONE (ROXICODONE) 5 mg immediate release tablet   No No   Sig: Take 1 tablet (5 mg total) by mouth every 6 (six) hours as needed for moderate pain or severe painMax Daily Amount: 20 mg      Facility-Administered Medications: None       Past Medical History:   Diagnosis Date  Anxiety and depression     Chronic back pain     Chronic pain disorder     Hypercholesterolemia     last assessed 7/19/17    Hyperlipidemia     Mastitis     Sinusitis        Past Surgical History:   Procedure Laterality Date    BACK SURGERY      back sx for fx + disc rupture w/ hardware    BACK SURGERY      lower    COLONOSCOPY      HI COLONOSCOPY FLX DX W/COLLJ SPEC WHEN PFRMD N/A 8/13/2018    Procedure: COLONOSCOPY;  Surgeon: Nicolette Moses MD;  [facility]; Service: Gastroenterology    HI PERCUT IMPLNT NEUROELECT,EPIDURAL Right 8/13/2019    Procedure: INSERTION THORACIC DORSAL COLUMN SPINAL CORD STIMULATOR PERCUTANEOUS, RIGHT BUTTOCK;  Surgeon: Clementina Riggins MD;  Location:  MAIN OR;  Service: Neurosurgery    SPINAL CORD STIMULATOR TRIAL W/ LAMINOTOMY         Family History   Problem Relation Age of Onset    Diabetes Mother         mellitus    Stroke Father         syndrome    Hypertension Family      I have reviewed and agree with the history as documented  Social History     Tobacco Use    Smoking status: Current Every Day Smoker     Packs/day: 0 25     Types: Cigarettes    Smokeless tobacco: Never Used    Tobacco comment: encouraged smoking cessation   Substance Use Topics    Alcohol use: No    Drug use: No        Review of Systems   Constitutional: Negative for chills and fever  Respiratory: Negative for chest tightness and shortness of breath  Cardiovascular: Negative for chest pain  Gastrointestinal: Negative for nausea and vomiting  Musculoskeletal: Positive for arthralgias, back pain and myalgias  Skin: Negative for color change, rash and wound  Neurological: Negative for weakness and numbness  Physical Exam  Physical Exam   Constitutional: He appears well-developed and well-nourished  No distress  Cardiovascular: Normal rate and normal heart sounds  Pulmonary/Chest: Effort normal and breath sounds normal  He exhibits tenderness   He exhibits no edema and no swelling  Musculoskeletal: Normal range of motion  He exhibits tenderness  Back:    Neurological: He is alert  Skin: Skin is warm  He is not diaphoretic  No erythema  No pallor  Vitals reviewed  Vital Signs  ED Triage Vitals [08/17/19 1838]   Temperature Pulse Respirations Blood Pressure SpO2   98 °F (36 7 °C) 74 16 (!) 175/69 99 %      Temp Source Heart Rate Source Patient Position - Orthostatic VS BP Location FiO2 (%)   Temporal -- Sitting Right arm --      Pain Score       8           Vitals:    08/17/19 1838   BP: (!) 175/69   Pulse: 74   Patient Position - Orthostatic VS: Sitting         Visual Acuity      ED Medications  Medications - No data to display    Diagnostic Studies  Results Reviewed     None                 XR ribs with pa chest min 3 views LEFT   ED Interpretation by Gera Kim PA-C (08/17 1928)   Interpreted by me  No infiltrate, nl cardiac silhouette, no effusions    No rib fractures noted       XR spine lumbar 2 or 3 views injury   ED Interpretation by Gera Kim PA-C (08/17 1928)   No bony abnormality  Hardwire intact       by Shellia Saint (08/17 1902)                 Procedures  Procedures       ED Course                               MDM    Disposition  Final diagnoses:   Fall, initial encounter   Rib contusion, left, initial encounter     Time reflects when diagnosis was documented in both MDM as applicable and the Disposition within this note     Time User Action Codes Description Comment    8/17/2019  7:33 PM Vernard Champagne Add Gracchus Flack  UZZODaniel Fall, initial encounter     8/17/2019  7:33 PM Endy Salgado [[id number]] Rib contusion, left, initial encounter       ED Disposition     ED Disposition Condition Date/Time Comment    Discharge Stable Sat Aug 17, 2019  7:33 PM Yosef Morales discharge to home/self care              Follow-up Information     Follow up With Specialties Details Why Contact Info Additional [address] [facility] Emergency Medicine   [facility] [id number] [phone] [place] [facility], [address] , [facility], [place], [address]          Patient's Medications   Discharge Prescriptions    No medications on file     No discharge procedures on file      ED Provider  Electronically Signed by           Keena Dela Cruz PA-C  08/17/19 0725

## 2019-08-21 ENCOUNTER — TELEPHONE (OUTPATIENT)
Dept: NEUROSURGERY | Facility: CLINIC | Age: 54
End: 2019-08-21

## 2019-08-21 NOTE — TELEPHONE ENCOUNTER
Telephoned patient as a follow-up form sx and s/p fall 8/17 ED visit sustained FX ribs   He reports the shower was slippery  He has purchases a shower mat  He denies  dizziness --causing him to fall  He denies pain in right buttock IPG site or back pain reports everything is working fine  He is encouraged to marisol this office with any questions, concerns , exacerbation of pain  He verbalized and understanding and is appreciative of call         INSERTION THORACIC DORSAL COLUMN SPINAL CORD STIMULATOR PERCUTANEOUS, RIGHT BUTTOCK (Right Spine Thoracic)----08/13/19 7155

## 2019-08-23 DIAGNOSIS — G89.29 OTHER CHRONIC PAIN: ICD-10-CM

## 2019-08-23 NOTE — TELEPHONE ENCOUNTER
PT NEEDS REFILL OF fentaNYL (DURAGESIC) 100 mcg/hr TD 72 hr patch Place 1 patch on the skin every third dayMax Daily Amount: 1 patch        fentaNYL (DURAGESIC) 50 mcg/hr Place 1 patch on the skin every third dayMax Daily Amount: 1 patch    SEND TO [facility] ON FILE

## 2019-08-24 DIAGNOSIS — E78.49 OTHER HYPERLIPIDEMIA: Chronic | ICD-10-CM

## 2019-08-24 RX ORDER — FENTANYL 50 UG/H
1 PATCH TRANSDERMAL
Qty: 10 PATCH | Refills: 0 | Status: SHIPPED | OUTPATIENT
Start: 2019-08-24 | End: 2019-10-01 | Stop reason: ALTCHOICE

## 2019-08-24 RX ORDER — ATORVASTATIN CALCIUM 20 MG/1
TABLET, FILM COATED ORAL
Qty: 90 TABLET | Refills: 1 | Status: SHIPPED | OUTPATIENT
Start: 2019-08-24 | End: 2020-08-26 | Stop reason: SDUPTHER

## 2019-08-24 RX ORDER — FENTANYL 100 UG/H
1 PATCH TRANSDERMAL
Qty: 10 PATCH | Refills: 0 | Status: SHIPPED | OUTPATIENT
Start: 2019-08-24 | End: 2019-10-01 | Stop reason: ALTCHOICE

## 2019-08-27 ENCOUNTER — CLINICAL SUPPORT (OUTPATIENT)
Dept: NEUROSURGERY | Facility: CLINIC | Age: 54
End: 2019-08-27

## 2019-08-27 VITALS — DIASTOLIC BLOOD PRESSURE: 80 MMHG | TEMPERATURE: 97.1 F | SYSTOLIC BLOOD PRESSURE: 116 MMHG

## 2019-08-27 DIAGNOSIS — Z98.890 POST-OPERATIVE STATE: Primary | ICD-10-CM

## 2019-08-27 PROCEDURE — 99024 POSTOP FOLLOW-UP VISIT: CPT

## 2019-08-27 NOTE — PROGRESS NOTES
Post-Op Visit-Neurosurgery    Mae Elizabeth 46 y o  male MRN: [id number]    Chief Complaint:  Patient presents post: [address], RIGHT BUTTOCK (Right Spine Thoracic)    History of Present Illness:  Patient presents for 2 week POV for incision check  Arrived accompanied by his wife and ambulated well without assistive device  Reports pain is 5/10 and continues with the use of fentanyl patch as prescribed by PM  Has not had to take any break through pain medication at this time  Current Outpatient Medications:     acetaminophen (TYLENOL) 500 mg tablet, Take 500 mg by mouth every 6 (six) hours as needed for mild pain, Disp: , Rfl:     atorvastatin (LIPITOR) 20 mg tablet, TAKE 1 TABLET BY MOUTH EVERY DAY, Disp: 90 tablet, Rfl: 1    fentaNYL (DURAGESIC) 100 mcg/hr TD 72 hr patch, Place 1 patch on the skin every third dayMax Daily Amount: 1 patch, Disp: 10 patch, Rfl: 0    fentaNYL (DURAGESIC) 50 mcg/hr, Place 1 patch on the skin every third dayMax Daily Amount: 1 patch, Disp: 10 patch, Rfl: 0    gabapentin (NEURONTIN) 400 mg capsule, Take 1 capsule (400 mg total) by mouth 3 (three) times a day, Disp: 90 capsule, Rfl: 1    oxyCODONE (ROXICODONE) 5 mg immediate release tablet, Take 1 tablet (5 mg total) by mouth every 6 (six) hours as needed for moderate pain or severe painMax Daily Amount: 20 mg (Patient not taking: Reported on 8/27/2019), Disp: 25 tablet, Rfl: 0   No Known Allergies       Assessment:    Vitals:    08/27/19 1451   BP: 116/80   BP Location: Right arm   Patient Position: Sitting   Temp: (!) 97 1 °F (36 2 °C)   TempSrc: Tympanic      Pain Score:   5     Wound Exam: Incisions are clean, dry, and in tact well approximated without heat, redness, swelling, or drainage  Mild irritation at the suture line likely local inflammation   See image below:            Location: Midline thoracic spine, right upper buttock    Procedure:  Surgical site assessment  Complications: None  Incision REHAN  Discussion/Summary  Clipped the long ends of sutures and left REHAN  Reviewed incision care with patient including daily observation for s/s infection including: increased erythema, edema, drainage, dehiscence of incision or fever >101  Should these be observed, he understands that he is to call and/or return immediately for reassessment  Advised patient to continue cleansing area with mild soap and water and pat dry  Not to apply any lotions, creams, or ointments, & not to submerge in any water for 4 more weeks  He is to maintain activity restrictions until cleared by the surgeon  Activity levels were also reviewed with the patient in detail, he is to lift no greater than 10 pounds, advised to limit bend/twisting at the waist and ambulation is encouraged as tolerated  Verified date/time/location of upcoming POV on 9/27/2019  He is to call the office with any further questions or concerns, or if any incisional issues or fevers would arise       Met with Meet Youjanessa Garibay and session report will be attached upon receipt

## 2019-09-04 ENCOUNTER — OFFICE VISIT (OUTPATIENT)
Dept: FAMILY MEDICINE CLINIC | Facility: CLINIC | Age: 54
End: 2019-09-04
Payer: MEDICARE

## 2019-09-04 VITALS
WEIGHT: 171.2 LBS | RESPIRATION RATE: 16 BRPM | OXYGEN SATURATION: 97 % | TEMPERATURE: 97.4 F | HEIGHT: 68 IN | BODY MASS INDEX: 25.94 KG/M2 | HEART RATE: 57 BPM | DIASTOLIC BLOOD PRESSURE: 72 MMHG | SYSTOLIC BLOOD PRESSURE: 126 MMHG

## 2019-09-04 DIAGNOSIS — G89.4 CHRONIC PAIN SYNDROME: ICD-10-CM

## 2019-09-04 DIAGNOSIS — M54.50 CHRONIC BILATERAL LOW BACK PAIN WITHOUT SCIATICA: Primary | ICD-10-CM

## 2019-09-04 DIAGNOSIS — M96.1 POSTLAMINECTOMY SYNDROME, LUMBAR REGION: ICD-10-CM

## 2019-09-04 DIAGNOSIS — R07.81 RIB PAIN ON LEFT SIDE: ICD-10-CM

## 2019-09-04 DIAGNOSIS — G89.29 CHRONIC BILATERAL LOW BACK PAIN WITHOUT SCIATICA: Primary | ICD-10-CM

## 2019-09-04 PROCEDURE — 99214 OFFICE O/P EST MOD 30 MIN: CPT | Performed by: FAMILY MEDICINE

## 2019-09-04 NOTE — PROGRESS NOTES
Assessment/Plan:    No problem-specific Assessment & Plan notes found for this encounter  Diagnoses and all orders for this visit:    Chronic bilateral low back pain without sciatica  Comments:  Jen Guardado is doing well now, post-replacement of his Neuropain Stimulator  He continues f/u with Neurosurgery  Chronic pain syndrome  Comments:  Much improved  Dropping Fentanyl to 100mcg/hour by patch at this time  In a month, will likely try to drop to 75mcg  Postlaminectomy syndrome, lumbar region    Rib pain on left side  Comments:  Healing slowly post-fall  Discussed using heat to the region, support to area when coughing/sneezing, etc           Subjective:      Patient ID: Evangelina Mackay is a 46 y o  male  Jen Guardado presents today in f/u after his Neuropain stimulator replacement (8/13/1), with Dr Jennifer Jurado of Neurosurgery  He did suffer a fall, requiring him to go to the ER 8/17/19  No rib fractures seen; no injury to new equipment  All records reviewed  We discussed managing his Fentanyl from here - weaning down; we will drop to 100mcg/hr now  The following portions of the patient's history were reviewed and updated as appropriate: allergies, current medications, past family history, past social history, past surgical history and problem list     Past Medical History:   Diagnosis Date    Anxiety and depression     Chronic back pain     Chronic pain disorder     Hypercholesterolemia     last assessed 7/19/17    Hyperlipidemia     Mastitis     Sinusitis      Past Surgical History:   Procedure Laterality Date    BACK SURGERY      back sx for fx + disc rupture w/ hardware    BACK SURGERY      lower    COLONOSCOPY      TN COLONOSCOPY FLX DX W/COLLJ SPEC WHEN PFRMD N/A 8/13/2018    Procedure: COLONOSCOPY;  Surgeon: Phil Fatima MD;  [facility];   Service: Gastroenterology    TN PERCUT IMPLNT NEUROELECT,EPIDURAL Right 8/13/2019    Procedure: INSERTION THORACIC DORSAL COLUMN SPINAL CORD STIMULATOR PERCUTANEOUS, RIGHT BUTTOCK;  Surgeon: Aydee Mayer MD;  [facility];  Service: Neurosurgery    SPINAL CORD STIMULATOR TRIAL W/ LAMINOTOMY         Current Outpatient Medications:     acetaminophen (TYLENOL) 500 mg tablet, Take 500 mg by mouth every 6 (six) hours as needed for mild pain, Disp: , Rfl:     atorvastatin (LIPITOR) 20 mg tablet, TAKE 1 TABLET BY MOUTH EVERY DAY, Disp: 90 tablet, Rfl: 1    fentaNYL (DURAGESIC) 100 mcg/hr TD 72 hr patch, Place 1 patch on the skin every third dayMax Daily Amount: 1 patch, Disp: 10 patch, Rfl: 0    fentaNYL (DURAGESIC) 50 mcg/hr, Place 1 patch on the skin every third dayMax Daily Amount: 1 patch, Disp: 10 patch, Rfl: 0    gabapentin (NEURONTIN) 400 mg capsule, Take 1 capsule (400 mg total) by mouth 3 (three) times a day, Disp: 90 capsule, Rfl: 1    oxyCODONE (ROXICODONE) 5 mg immediate release tablet, Take 1 tablet (5 mg total) by mouth every 6 (six) hours as needed for moderate pain or severe painMax Daily Amount: 20 mg, Disp: 25 tablet, Rfl: 0    No Known Allergies    Social History     Tobacco Use    Smoking status: Current Every Day Smoker     Packs/day: 0 25     Types: Cigarettes    Smokeless tobacco: Never Used    Tobacco comment: encouraged smoking cessation   Substance Use Topics    Alcohol use: No    Drug use: No       Review of Systems   Constitutional: Negative for activity change and fever  Respiratory: Negative for shortness of breath  Cardiovascular: Negative for chest pain  +Left chest wall pain - improving slowly  Gastrointestinal: Negative for constipation  Musculoskeletal: Positive for back pain  Back and leg pain much improved with new neuropain stimulator           Objective:      /72   Pulse 57   Temp (!) 97 4 °F (36 3 °C)   Resp 16   Ht 5' 8 47" (1 739 m)   Wt 77 7 kg (171 lb 3 2 oz)   SpO2 97%   BMI 25 68 kg/m²          Physical Exam   Constitutional: He is oriented to person, place, and time  He appears well-developed and well-nourished  No distress  HENT:   Head: Normocephalic and atraumatic  Eyes: Conjunctivae are normal    Cardiovascular: Normal rate, regular rhythm and normal heart sounds  Exam reveals no gallop and no friction rub  No murmur heard  Pulmonary/Chest: Effort normal and breath sounds normal  No stridor  No respiratory distress  He has no wheezes  He has no rales  Musculoskeletal:        Back:    Neurological: He is alert and oriented to person, place, and time  Skin: He is not diaphoretic  Psychiatric: He has a normal mood and affect  His behavior is normal  Judgment and thought content normal    Nursing note and vitals reviewed

## 2019-09-27 ENCOUNTER — OFFICE VISIT (OUTPATIENT)
Dept: NEUROSURGERY | Facility: CLINIC | Age: 54
End: 2019-09-27

## 2019-09-27 VITALS
WEIGHT: 171 LBS | SYSTOLIC BLOOD PRESSURE: 128 MMHG | HEART RATE: 54 BPM | RESPIRATION RATE: 16 BRPM | DIASTOLIC BLOOD PRESSURE: 72 MMHG | TEMPERATURE: 97 F | HEIGHT: 68 IN | BODY MASS INDEX: 25.91 KG/M2

## 2019-09-27 DIAGNOSIS — Z48.89 AFTERCARE FOLLOWING SURGERY: Primary | ICD-10-CM

## 2019-09-27 PROCEDURE — 99024 POSTOP FOLLOW-UP VISIT: CPT | Performed by: NEUROLOGICAL SURGERY

## 2019-09-27 NOTE — PROGRESS NOTES
Assessment/Plan:    No problem-specific Assessment & Plan notes found for this encounter  Patient is 6 weeks post operative from a percutaneous thoracic stimulator  he has no complaints  The patient reported good efficacy from surgery  All the patients restrictions are lifted  All wounds well healed or progressing as normal        Diagnoses and all orders for this visit:    Aftercare following surgery          Subjective:      Patient ID: Rica Sin is a 46 y o  male  HPI    The following portions of the patient's history were reviewed and updated as appropriate: allergies, current medications, past family history, past medical history, past social history, past surgical history and problem list     Review of Systems   Constitutional: Positive for fatigue  HENT: Negative  Eyes: Negative  Respiratory: Negative  Cardiovascular: Negative  Gastrointestinal: Negative  Endocrine: Negative  Genitourinary: Negative  Musculoskeletal: Positive for back pain (per pt can feel lead in back, since then has stimulation constant  )  Skin: Negative  Allergic/Immunologic: Negative  Neurological: Positive for weakness (right leg, improving)  Negative for numbness  Hematological: Negative  Psychiatric/Behavioral: Negative  Objective:      /72 (BP Location: Left arm)   Pulse (!) 54   Temp (!) 97 °F (36 1 °C) (Tympanic)   Resp 16   Ht 5' 8" (1 727 m)   Wt 77 6 kg (171 lb)   BMI 26 00 kg/m²          Physical Exam   Constitutional: He is oriented to person, place, and time  He appears well-developed  HENT:   Head: Normocephalic and atraumatic  Pulmonary/Chest: Effort normal    Neurological: He is alert and oriented to person, place, and time  Skin: Skin is warm       incision well healed

## 2019-10-01 DIAGNOSIS — G89.29 OTHER CHRONIC PAIN: ICD-10-CM

## 2019-10-01 RX ORDER — FENTANYL 100 UG/H
1 PATCH TRANSDERMAL
Qty: 10 PATCH | Refills: 0 | Status: SHIPPED | OUTPATIENT
Start: 2019-10-01 | End: 2019-10-28 | Stop reason: SDUPTHER

## 2019-10-28 DIAGNOSIS — G89.29 OTHER CHRONIC PAIN: ICD-10-CM

## 2019-10-28 RX ORDER — FENTANYL 100 UG/H
1 PATCH TRANSDERMAL
Qty: 10 PATCH | Refills: 0 | Status: SHIPPED | OUTPATIENT
Start: 2019-10-28 | End: 2019-11-27 | Stop reason: SDUPTHER

## 2019-10-28 NOTE — TELEPHONE ENCOUNTER
Medication:fentaNYL (DURAGESIC) 100 mcg/hr TD 72 hr patch       Dosage:1 patch  How Often:Sig: Place 1 patch on the skin every third dayMax Daily Amount: 1 patch  Quantity:  10  Last Office Visit: 9/4/19  Next Office Visit:11/4/19  Last refilled:10/1/19  How many pills left: a couple  Pharmacy:   [facility]/pharmacy #[id number], Rogelio Nogueira  [facility] [place] [address] [place] [id number]  Phone: [phone] Fax: [phone]

## 2019-11-04 ENCOUNTER — OFFICE VISIT (OUTPATIENT)
Dept: FAMILY MEDICINE CLINIC | Facility: CLINIC | Age: 54
End: 2019-11-04
Payer: MEDICARE

## 2019-11-04 VITALS
RESPIRATION RATE: 16 BRPM | HEIGHT: 68 IN | SYSTOLIC BLOOD PRESSURE: 116 MMHG | BODY MASS INDEX: 26.49 KG/M2 | OXYGEN SATURATION: 97 % | HEART RATE: 58 BPM | DIASTOLIC BLOOD PRESSURE: 70 MMHG | WEIGHT: 174.8 LBS | TEMPERATURE: 97.7 F

## 2019-11-04 DIAGNOSIS — Z13.1 SCREENING FOR DIABETES MELLITUS: ICD-10-CM

## 2019-11-04 DIAGNOSIS — Z23 ENCOUNTER FOR IMMUNIZATION: ICD-10-CM

## 2019-11-04 DIAGNOSIS — Z13.6 SCREENING FOR CARDIOVASCULAR CONDITION: ICD-10-CM

## 2019-11-04 DIAGNOSIS — Z00.00 MEDICARE ANNUAL WELLNESS VISIT, SUBSEQUENT: ICD-10-CM

## 2019-11-04 DIAGNOSIS — Z12.2 ENCOUNTER FOR SCREENING FOR LUNG CANCER: ICD-10-CM

## 2019-11-04 DIAGNOSIS — E78.49 OTHER HYPERLIPIDEMIA: ICD-10-CM

## 2019-11-04 DIAGNOSIS — M54.16 LUMBAR RADICULOPATHY: Primary | ICD-10-CM

## 2019-11-04 DIAGNOSIS — G89.4 CHRONIC PAIN SYNDROME: ICD-10-CM

## 2019-11-04 DIAGNOSIS — M48.061 LUMBAR FORAMINAL STENOSIS: ICD-10-CM

## 2019-11-04 DIAGNOSIS — Z72.0 TOBACCO USE: ICD-10-CM

## 2019-11-04 PROBLEM — F33.41 RECURRENT MAJOR DEPRESSIVE DISORDER, IN PARTIAL REMISSION (HCC): Status: RESOLVED | Noted: 2019-02-21 | Resolved: 2019-11-04

## 2019-11-04 PROCEDURE — 99214 OFFICE O/P EST MOD 30 MIN: CPT | Performed by: FAMILY MEDICINE

## 2019-11-04 PROCEDURE — G0008 ADMIN INFLUENZA VIRUS VAC: HCPCS

## 2019-11-04 PROCEDURE — 90682 RIV4 VACC RECOMBINANT DNA IM: CPT

## 2019-11-04 PROCEDURE — G0439 PPPS, SUBSEQ VISIT: HCPCS | Performed by: FAMILY MEDICINE

## 2019-11-04 RX ORDER — VARENICLINE TARTRATE 25 MG
KIT ORAL
Qty: 53 TABLET | Refills: 0 | Status: SHIPPED | OUTPATIENT
Start: 2019-11-04

## 2019-11-04 RX ORDER — VARENICLINE TARTRATE 1 MG/1
1 TABLET, FILM COATED ORAL 2 TIMES DAILY
Qty: 60 TABLET | Refills: 1 | Status: SHIPPED | OUTPATIENT
Start: 2019-11-04 | End: 2019-11-28 | Stop reason: SDUPTHER

## 2019-11-04 NOTE — PROGRESS NOTES
Assessment/Plan:    No problem-specific Assessment & Plan notes found for this encounter  Diagnoses and all orders for this visit:    Lumbar radiculopathy  Comments:  Hx of; s/p replacement of his Neuropain Stimulator  Still seeing Neurosurgery  Doing better  Still having some pain  - continue Fentanyl at present dosing  Medicare annual wellness visit, subsequent  Comments:  Chidi appears well  He is to continue a balanced diet, and regular exercise (he is back walking now)  FBW is UTD  Screening for diabetes mellitus    Screening for cardiovascular condition    Encounter for immunization  Comments:  Flu Vaccine was given IM today, and tolerated well  Orders:  -     influenza vaccine, [id number], quadrivalent, recombinant, PF, 0 5 mL, for patients 18 yr+ (FLUBLOK)    Lumbar foraminal stenosis  Comments:  As above  Hoping to possibly start to wean his Fentanyl dosing (patches) in the upcoming next 2 - 3 months  [place]MP checked, and appropriate  Chronic pain syndrome  Comments:  As above  Other hyperlipidemia  Comments:  Controlled with Atorvastatin  Tobacco use  Comments:  Pt will try Chantix at this time  Discussed the potential R/SE of the medication  Orders:  -     CT lung screening program; Future  -     varenicline (CHANTIX TERRA) 0 5 MG X 11 & 1 MG X 42 tablet; Take one 0 5mg tab by mouth 1x daily for 3 days, then increase to one 0 5mg tab 2x daily for 3 days, then increase to one 1mg tab 2x daily  -     varenicline (CHANTIX) 1 mg tablet; Take 1 tablet (1 mg total) by mouth 2 (two) times a day    Encounter for screening for lung cancer  Comments:  Pt turns 54 next week, and meets criteria for screening  LDCT ordered  Orders:  -     CT lung screening program; Future          Subjective:      Patient ID: Navya Bullard is a 46 y o  male  Alicia Eric presents in f/u today  He did see Neurosurgery in f/u on 9/27/19 post-replacement of his Neuropain Stimulator  He was doing well    He has been able to drop his Fentanyl Patches to 100mcg/hr successfully  [place] PDMP was checked today, and refills have been appropriate  He is UTD on colonoscopy - had in 08/2018 with Dr Hair Durant of GI  Most recent labs -> 08/2019 A1 at 5 1%; 03/2019 PSA at 0 4, LDL Cholesterol at 120  Overall, chronic pain is much better - more good days then bad  He has had no issues with depressed mood now  He has not taken Duloxetine in almost 6 months  He really wants to try to stop smoking again  Rich has no hx of seizures in the past   He reports that he is seeing his Dentist           The following portions of the patient's history were reviewed and updated as appropriate: allergies, current medications, past family history, past social history, past surgical history and problem list     Past Medical History:   Diagnosis Date    Anxiety and depression     Chronic back pain     Chronic pain disorder     Hypercholesterolemia     last assessed 7/19/17    Hyperlipidemia     Mastitis     Sinusitis      Past Surgical History:   Procedure Laterality Date    BACK SURGERY      back sx for fx + disc rupture w/ hardware    BACK SURGERY      lower    COLONOSCOPY      DC COLONOSCOPY FLX DX W/COLLJ SPEC WHEN PFRMD N/A 8/13/2018    Procedure: COLONOSCOPY;  Surgeon: Lalo Hurst MD;  Location: [facility];   Service: Gastroenterology    DC PERCUT IMPLNT NEUROELECT,EPIDURAL Right 8/13/2019    Procedure: INSERTION THORACIC DORSAL COLUMN SPINAL CORD STIMULATOR PERCUTANEOUS, RIGHT BUTTOCK;  Surgeon: Damian Barfield MD;  Location: [facility] OR;  Service: Neurosurgery    SPINAL CORD STIMULATOR TRIAL W/ LAMINOTOMY         Current Outpatient Medications:     acetaminophen (TYLENOL) 500 mg tablet, Take 500 mg by mouth every 6 (six) hours as needed for mild pain, Disp: , Rfl:     atorvastatin (LIPITOR) 20 mg tablet, TAKE 1 TABLET BY MOUTH EVERY DAY, Disp: 90 tablet, Rfl: 1    fentaNYL (DURAGESIC) 100 mcg/hr TD 72 hr patch, Place 1 patch on the skin every third dayMax Daily Amount: 1 patch, Disp: 10 patch, Rfl: 0    gabapentin (NEURONTIN) 400 mg capsule, Take 1 capsule (400 mg total) by mouth 3 (three) times a day, Disp: 90 capsule, Rfl: 1    oxyCODONE (ROXICODONE) 5 mg immediate release tablet, Take 1 tablet (5 mg total) by mouth every 6 (six) hours as needed for moderate pain or severe painMax Daily Amount: 20 mg (Patient not taking: Reported on 9/27/2019), Disp: 25 tablet, Rfl: 0    varenicline (CHANTIX TERRA) 0 5 MG X 11 & 1 MG X 42 tablet, Take one 0 5mg tab by mouth 1x daily for 3 days, then increase to one 0 5mg tab 2x daily for 3 days, then increase to one 1mg tab 2x daily, Disp: 53 tablet, Rfl: 0    varenicline (CHANTIX) 1 mg tablet, Take 1 tablet (1 mg total) by mouth 2 (two) times a day, Disp: 60 tablet, Rfl: 1    No Known Allergies    Social History     Tobacco Use    Smoking status: Current Every Day Smoker     Packs/day: 0 25     Types: Cigarettes    Smokeless tobacco: Never Used    Tobacco comment: encouraged smoking cessation   Substance Use Topics    Alcohol use: No    Drug use: No       Review of Systems   Constitutional: Negative for activity change  Respiratory: Negative for shortness of breath  Cardiovascular: Negative for chest pain  Gastrointestinal: Negative for abdominal pain and blood in stool  Genitourinary: Negative for decreased urine volume  Musculoskeletal: Positive for back pain  Psychiatric/Behavioral: Negative for dysphoric mood  The patient is not nervous/anxious  Objective:      /70 (BP Location: Left arm, Patient Position: Sitting, Cuff Size: Standard)   Pulse 58   Temp 97 7 °F (36 5 °C) (Tympanic)   Resp 16   Ht 5' 8" (1 727 m)   Wt 79 3 kg (174 lb 12 8 oz)   SpO2 97%   BMI 26 58 kg/m²          Physical Exam   Constitutional: He is oriented to person, place, and time  He appears well-developed and well-nourished  No distress  HENT:   Head: Normocephalic and atraumatic  Mouth/Throat: Oropharynx is clear and moist  No oropharyngeal exudate  Eyes: Conjunctivae are normal    Neck: Normal range of motion  Neck supple  No thyromegaly present  Cardiovascular: Normal rate, regular rhythm and normal heart sounds  Exam reveals no gallop and no friction rub  No murmur heard  Pulmonary/Chest: Effort normal and breath sounds normal  No stridor  No respiratory distress  He has no wheezes  He has no rales  Abdominal: Soft  Bowel sounds are normal  He exhibits no distension and no mass  There is no tenderness  There is no rebound and no guarding  Genitourinary: Rectum normal and prostate normal    Genitourinary Comments: No gross blood on the examining finger  Lymphadenopathy:     He has no cervical adenopathy  Neurological: He is alert and oriented to person, place, and time  Skin: He is not diaphoretic  Psychiatric: He has a normal mood and affect  His behavior is normal  Judgment and thought content normal    Nursing note and vitals reviewed

## 2019-11-04 NOTE — PATIENT INSTRUCTIONS
Medicare Preventive Visit Patient Instructions  Thank you for completing your Welcome to Medicare Visit or Medicare Annual Wellness Visit today  Your next wellness visit will be due in one year (11/4/2020)  The screening/preventive services that you may require over the next 5-10 years are detailed below  Some tests may not apply to you based off risk factors and/or age  Screening tests ordered at today's visit but not completed yet may show as past due  Also, please note that scanned in results may not display below  Preventive Screenings:  Service Recommendations Previous Testing/Comments   Colorectal Cancer Screening  · Colonoscopy    · Fecal Occult Blood Test (FOBT)/Fecal Immunochemical Test (FIT)  · Fecal DNA/Cologuard Test  · Flexible Sigmoidoscopy Age: 54-65 years old   Colonoscopy: every 10 years (May be performed more frequently if at higher risk)  OR  FOBT/FIT: every 1 year  OR  Cologuard: every 3 years  OR  Sigmoidoscopy: every 5 years  Screening may be recommended earlier than age 48 if at higher risk for colorectal cancer  Also, an individualized decision between you and your healthcare provider will decide whether screening between the ages of 74-80 would be appropriate  Colonoscopy: 08/13/2018  FOBT/FIT: Not on file  Cologuard: Not on file  Sigmoidoscopy: Not on file         Prostate Cancer Screening Individualized decision between patient and health care provider in men between ages of 53-78   Medicare will cover every 12 months beginning on the day after your 50th birthday PSA: 0 4 ng/mL          Hepatitis C Screening Once for adults born between 1945 and 1965  More frequently in patients at high risk for Hepatitis C Hep C Antibody: Not on file       Diabetes Screening 1-2 times per year if you're at risk for diabetes or have pre-diabetes Fasting glucose: 79 mg/dL   A1C: 5 1 %       Cholesterol Screening Once every 5 years if you don't have a lipid disorder   May order more often based on risk factors  Lipid panel: 03/05/2019          Other Preventive Screenings Covered by Medicare:  1  Abdominal Aortic Aneurysm (AAA) Screening: covered once if your at risk  You're considered to be at risk if you have a family history of AAA or a male between the age of 73-68 who smoking at least 100 cigarettes in your lifetime  2  Lung Cancer Screening: covers low dose CT scan once per year if you meet all of the following conditions: (1) Age 50-69; (2) No signs or symptoms of lung cancer; (3) Current smoker or have quit smoking within the last 15 years; (4) You have a tobacco smoking history of at least 30 pack years (packs per day x number of years you smoked); (5) You get a written order from a healthcare provider  3  Glaucoma Screening: covered annually if you're considered high risk: (1) You have diabetes OR (2) Family history of glaucoma OR (3)  aged 48 and older OR (3)  American aged 72 and older  3  Osteoporosis Screening: covered every 2 years if you meet one of the following conditions: (1) Have a vertebral abnormality; (2) On glucocorticoid therapy for more than 3 months; (3) Have primary hyperparathyroidism; (4) On osteoporosis medications and need to assess response to drug therapy  5  HIV Screening: covered annually if you're between the age of 12-76  Also covered annually if you are younger than 13 and older than 72 with risk factors for HIV infection  For pregnant patients, it is covered up to 3 times per pregnancy  Immunizations:  Immunization Recommendations   Influenza Vaccine Annual influenza vaccination during flu season is recommended for all persons aged >= 6 months who do not have contraindications   Pneumococcal Vaccine (Prevnar and Pneumovax)  * Prevnar = PCV13  * Pneumovax = PPSV23 Adults 25-60 years old: 1-3 doses may be recommended based on certain risk factors  Adults 72 years old: Prevnar (PCV13) vaccine recommended followed by Pneumovax (PPSV23) vaccine   If already received PPSV23 since turning 65, then PCV13 recommended at least one year after PPSV23 dose  Hepatitis B Vaccine 3 dose series if at intermediate or high risk (ex: diabetes, end stage renal disease, liver disease)   Tetanus (Td) Vaccine - COST NOT COVERED BY MEDICARE PART B Following completion of primary series, a booster dose should be given every 10 years to maintain immunity against tetanus  Td may also be given as tetanus wound prophylaxis  Tdap Vaccine - COST NOT COVERED BY MEDICARE PART B Recommended at least once for all adults  For pregnant patients, recommended with each pregnancy  Shingles Vaccine (Shingrix) - COST NOT COVERED BY MEDICARE PART B  2 shot series recommended in those aged 48 and above     Health Maintenance Due:      Topic Date Due    Hepatitis C Screening  1965    CRC Screening: Colonoscopy  08/13/2028     Immunizations Due:      Topic Date Due    Pneumococcal Vaccine: Pediatrics (0 to 5 Years) and At-Risk Patients (6 to 59 Years) (1 of 1 - PPSV23) 11/13/1970    INFLUENZA VACCINE  07/01/2019     Advance Directives   What are advance directives? Advance directives are legal documents that state your wishes and plans for medical care  These plans are made ahead of time in case you lose your ability to make decisions for yourself  Advance directives can apply to any medical decision, such as the treatments you want, and if you want to donate organs  What are the types of advance directives? There are many types of advance directives, and each state has rules about how to use them  You may choose a combination of any of the following:  · Living will: This is a written record of the treatment you want  You can also choose which treatments you do not want, which to limit, and which to stop at a certain time  This includes surgery, medicine, IV fluid, and tube feedings  · Durable power of  for healthcare [facility]):   This is a written record that states who you want to make healthcare choices for you when you are unable to make them for yourself  This person, called a proxy, is usually a family member or a friend  You may choose more than 1 proxy  · Do not resuscitate (DNR) order:  A DNR order is used in case your heart stops beating or you stop breathing  It is a request not to have certain forms of treatment, such as CPR  A DNR order may be included in other types of advance directives  · Medical directive: This covers the care that you want if you are in a coma, near death, or unable to make decisions for yourself  You can list the treatments you want for each condition  Treatment may include pain medicine, surgery, blood transfusions, dialysis, IV or tube feedings, and a ventilator (breathing machine)  · Values history: This document has questions about your views, beliefs, and how you feel and think about life  This information can help others choose the care that you would choose  Why are advance directives important? An advance directive helps you control your care  Although spoken wishes may be used, it is better to have your wishes written down  Spoken wishes can be misunderstood, or not followed  Treatments may be given even if you do not want them  An advance directive may make it easier for your family to make difficult choices about your care  Cigarette Smoking and Your Health   Risks to your health if you smoke:  Nicotine and other chemicals found in tobacco damage every cell in your body  Even if you are a light smoker, you have an increased risk for cancer, heart disease, and lung disease  If you are pregnant or have diabetes, smoking increases your risk for complications  Benefits to your health if you stop smoking:   · You decrease respiratory symptoms such as coughing, wheezing, and shortness of breath  · You reduce your risk for cancers of the lung, mouth, throat, kidney, bladder, pancreas, stomach, and cervix   If you already have cancer, you increase the benefits of chemotherapy  You also reduce your risk for cancer returning or a second cancer from developing  · You reduce your risk for heart disease, blood clots, heart attack, and stroke  · You reduce your risk for lung infections, and diseases such as pneumonia, asthma, chronic bronchitis, and emphysema  · Your circulation improves  More oxygen can be delivered to your body  If you have diabetes, you lower your risk for complications, such as kidney, artery, and eye diseases  You also lower your risk for nerve damage  Nerve damage can lead to amputations, poor vision, and blindness  · You improve your body's ability to heal and to fight infections  For more information and support to stop smoking:   · [employer]  Phone: 6- 924 - [phone]  Web Address: [employer]  Weight Management   Why it is important to manage your weight:  Being overweight increases your risk of health conditions such as heart disease, high blood pressure, type 2 diabetes, and certain types of cancer  It can also increase your risk for osteoarthritis, sleep apnea, and other respiratory problems  Aim for a slow, steady weight loss  Even a small amount of weight loss can lower your risk of health problems  How to lose weight safely:  A safe and healthy way to lose weight is to eat fewer calories and get regular exercise  You can lose up about 1 pound a week by decreasing the number of calories you eat by 500 calories each day  Healthy meal plan for weight management:  A healthy meal plan includes a variety of foods, contains fewer calories, and helps you stay healthy  A healthy meal plan includes the following:  · Eat whole-grain foods more often  A healthy meal plan should contain fiber  Fiber is the part of grains, fruits, and vegetables that is not broken down by your body  Whole-grain foods are healthy and provide extra fiber in your diet   Some examples of whole-grain foods are whole-wheat breads and pastas, oatmeal, brown rice, and bulgur  · Eat a variety of vegetables every day  Include dark, leafy greens such as spinach, kale, pepito greens, and mustard greens  Eat yellow and orange vegetables such as carrots, sweet potatoes, and winter squash  · Eat a variety of fruits every day  Choose fresh or canned fruit (canned in its own juice or light syrup) instead of juice  Fruit juice has very little or no fiber  · Eat low-fat dairy foods  Drink fat-free (skim) milk or 1% milk  Eat fat-free yogurt and low-fat cottage cheese  Try low-fat cheeses such as mozzarella and other reduced-fat cheeses  · Choose meat and other protein foods that are low in fat  Choose beans or other legumes such as split peas or lentils  Choose fish, skinless poultry (chicken or turkey), or lean cuts of red meat (beef or pork)  Before you cook meat or poultry, cut off any visible fat  · Use less fat and oil  Try baking foods instead of frying them  Add less fat, such as margarine, sour cream, regular salad dressing and mayonnaise to foods  Eat fewer high-fat foods  Some examples of high-fat foods include french fries, doughnuts, ice cream, and cakes  · Eat fewer sweets  Limit foods and drinks that are high in sugar  This includes candy, cookies, regular soda, and sweetened drinks  Exercise:  Exercise at least 30 minutes per day on most days of the week  Some examples of exercise include walking, biking, dancing, and swimming  You can also fit in more physical activity by taking the stairs instead of the elevator or parking farther away from stores  Ask your healthcare provider about the best exercise plan for you  © Copyright [employer] 2018 Information is for End User's use only and may not be sold, redistributed or otherwise used for commercial purposes   All illustrations and images included in CareNotes® are the copyrighted property of [employer]  or [address] [place] [address]

## 2019-11-04 NOTE — PROGRESS NOTES
Assessment and Plan:     Problem List Items Addressed This Visit        Nervous and Auditory    Lumbar radiculopathy - Primary       Musculoskeletal and Integument    Lumbar foraminal stenosis       Other    Other hyperlipidemia    Chronic pain syndrome      Other Visit Diagnoses     Medicare annual wellness visit, subsequent        Rich appears well  He is to continue a balanced diet, and regular exercise (he is back walking now)  FBW is UTD  Screening for diabetes mellitus        Screening for cardiovascular condition        Encounter for immunization        Flu Vaccine was given IM today, and tolerated well  Relevant Orders    influenza vaccine, 7900-1662, quadrivalent, recombinant, PF, 0 5 mL, for patients 18 yr+ (FLUBLOK) (Completed)    Tobacco use        Pt will try Chantix at this time  Discussed the potential R/SE of the medication  Relevant Medications    varenicline (CHANTIX TERRA) 0 5 MG X 11 & 1 MG X 42 tablet    varenicline (CHANTIX) 1 mg tablet    Other Relevant Orders    CT lung screening program    Encounter for screening for lung cancer        Pt turns 54 next week, and meets criteria for screening  LDCT ordered  Relevant Orders    CT lung screening program           Preventive health issues were discussed with patient, and age appropriate screening tests were ordered as noted in patient's After Visit Summary  Personalized health advice and appropriate referrals for health education or preventive services given if needed, as noted in patient's After Visit Summary       History of Present Illness:     Patient presents for Medicare Annual Wellness visit    Patient Care Team:  Jose Hudson DO as PCP - General (Family Medicine)  DO Aydee Francisco MD as Endoscopist     Problem List:     Patient Active Problem List   Diagnosis    Chest pain in adult    Other hyperlipidemia    Chronic low back pain    Diaphoresis    Screening for colon cancer    Encounter for screening colonoscopy    Lumbar radiculopathy    Postlaminectomy syndrome, lumbar region    Lumbar foraminal stenosis    Lumbar spondylosis    Chronic pain syndrome      Past Medical and Surgical History:     Past Medical History:   Diagnosis Date    Anxiety and depression     Chronic back pain     Chronic pain disorder     Hypercholesterolemia     last assessed 7/19/17    Hyperlipidemia     Mastitis     Sinusitis      Past Surgical History:   Procedure Laterality Date    BACK SURGERY      back sx for fx + disc rupture w/ hardware    BACK SURGERY      lower    COLONOSCOPY      [place] COLONOSCOPY FLX DX W/COLLJ SPEC WHEN PFRMD N/A 8/13/2018    Procedure: COLONOSCOPY;  Surgeon: Andrey Espinosa MD;  [facility];   Service: Gastroenterology    NJ PERCUT IMPLNT NEUROELECT,EPIDURAL Right 8/13/2019    Procedure: INSERTION THORACIC DORSAL COLUMN SPINAL CORD STIMULATOR PERCUTANEOUS, RIGHT BUTTOCK;  Surgeon: Johnna Rice MD;  Location:  MAIN OR;  Service: Neurosurgery    SPINAL CORD STIMULATOR TRIAL W/ LAMINOTOMY        Family History:     Family History   Problem Relation Age of Onset    Diabetes Mother         mellitus    Stroke Father         syndrome    Hypertension Family       Social History:     Social History     Socioeconomic History    Marital status: Single     Spouse name: None    Number of children: None    Years of education: None    Highest education level: None   Occupational History    None   Social Needs    Financial resource strain: None    Food insecurity:     Worry: None     Inability: None    Transportation needs:     Medical: None     Non-medical: None   Tobacco Use    Smoking status: Current Every Day Smoker     Packs/day: 0 25     Types: Cigarettes    Smokeless tobacco: Never Used    Tobacco comment: encouraged smoking cessation   Substance and Sexual Activity    Alcohol use: No    Drug use: No    Sexual activity: None   Lifestyle    Physical activity: Days per week: None     Minutes per session: None    Stress: None   Relationships    Social connections:     Talks on phone: None     Gets together: None     Attends [employer] service: None     Active member of club or organization: None     Attends meetings of clubs or organizations: None     Relationship status: None    Intimate partner violence:     Fear of current or ex partner: None     Emotionally abused: None     Physically abused: None     Forced sexual activity: None   Other Topics Concern    None   Social History Narrative    None       Medications and Allergies:     Current Outpatient Medications   Medication Sig Dispense Refill    acetaminophen (TYLENOL) 500 mg tablet Take 500 mg by mouth every 6 (six) hours as needed for mild pain      atorvastatin (LIPITOR) 20 mg tablet TAKE 1 TABLET BY MOUTH EVERY DAY 90 tablet 1    fentaNYL (DURAGESIC) 100 mcg/hr TD 72 hr patch Place 1 patch on the skin every third dayMax Daily Amount: 1 patch 10 patch 0    gabapentin (NEURONTIN) 400 mg capsule Take 1 capsule (400 mg total) by mouth 3 (three) times a day 90 capsule 1    oxyCODONE (ROXICODONE) 5 mg immediate release tablet Take 1 tablet (5 mg total) by mouth every 6 (six) hours as needed for moderate pain or severe painMax Daily Amount: 20 mg (Patient not taking: Reported on 9/27/2019) 25 tablet 0    varenicline (CHANTIX TERRA) 0 5 MG X 11 & 1 MG X 42 tablet Take one 0 5mg tab by mouth 1x daily for 3 days, then increase to one 0 5mg tab 2x daily for 3 days, then increase to one 1mg tab 2x daily 53 tablet 0    varenicline (CHANTIX) 1 mg tablet Take 1 tablet (1 mg total) by mouth 2 (two) times a day 60 tablet 1     No current facility-administered medications for this visit        No Known Allergies   Immunizations:     Immunization History   Administered Date(s) Administered    INFLUENZA 10/29/2015, 10/01/2016, 08/28/2018    Influenza Quadrivalent Preservative Free 3 years and older IM 1964, 10/29/2015, 10/01/2016, 10/29/2017    Influenza TIV (IM) 11/20/2012    Influenza, recombinant, quadrivalent,injectable, preservative free 11/04/2019    Tdap 11/20/2012      Health Maintenance:         Topic Date Due    Hepatitis C Screening  1965    CRC Screening: Colonoscopy  08/13/2028         Topic Date Due    Pneumococcal Vaccine: Pediatrics (0 to 5 Years) and At-Risk Patients (6 to 59 Years) (1 of 1 - PPSV23) 11/13/1970    INFLUENZA VACCINE  07/01/2019      Medicare Health Risk Assessment:     /70 (BP Location: Left arm, Patient Position: Sitting, Cuff Size: Standard)   Pulse 58   Temp 97 7 °F (36 5 °C) (Tympanic)   Resp 16   Ht 5' 8" (1 727 m)   Wt 79 3 kg (174 lb 12 8 oz)   SpO2 97%   BMI 26 58 kg/m²      Aroldo Knight is here for his Subsequent Wellness visit  Last Medicare Wellness visit information reviewed, patient interviewed, no change since last AWV  Health Risk Assessment:   Patient rates overall health as good  Patient feels that their physical health rating is slightly better  Eyesight was rated as same  Hearing was rated as same  Patient feels that their emotional and mental health rating is slightly better  Pain experienced in the last 7 days has been some  Patient's pain rating has been 5/10  Patient states that he has experienced no weight loss or gain in last 6 months  Ambrosio Young presents in f/u today  He did see Neurosurgery in f/u on 9/27/19 post-replacement of his Neuropain Stimulator  He was doing well  He has been able to drop his Fentanyl Patches to 100mcg/hr successfully  [place] PDMP was [place] today, and refills have been appropriate  He is UTD on colonoscopy - had in 08/2018 with Dr Morgan Hopping of GI  Most recent labs -> 08/2019 A1 at 5 1%; 03/2019 PSA at 0 4, LDL Cholesterol at 120  Overall, chronic pain is much better - more good days then bad  He has had no issues with depressed mood now  He has not taken Duloxetine in almost 6 months    He really wants to try to stop smoking again  Fall Risk Screening: In the past year, patient has experienced: history of falling in past year    Number of falls: 1  Injured during fall?: No    Feels unsteady when standing or walking?: No    Worried about falling?: No      Home Safety:  Patient does not have trouble with stairs inside or outside of their home  Patient has working smoke alarms and has working carbon monoxide detector  Home safety hazards include: none  Nutrition:   Current diet is Limited junk food and Regular  Medications:   Patient is not currently taking any over-the-counter supplements  Patient is able to manage medications  Activities of Daily Living (ADLs)/Instrumental Activities of Daily Living (IADLs):   Walk and transfer into and out of bed and chair?: Yes  Dress and groom yourself?: Yes    Bathe or shower yourself?: Yes    Feed yourself? Yes  Do your laundry/housekeeping?: Yes  Manage your money, pay your bills and track your expenses?: Yes  Make your own meals?: Yes    Do your own shopping?: Yes    Previous Hospitalizations:   Any hospitalizations or ED visits within the last 12 months?: Yes    How many hospitalizations have you had in the last year?: 1-2    Hospitalization Comments: Had surgery in 07/2019  Had a fall post-surgery - seen in the ER  Advance Care Planning:   Living will: No    Durable POA for healthcare: No    Advanced directive: No    Advanced directive counseling given: Yes    Five wishes given: Yes    Patient declined ACP directive: No    End of Life Decisions reviewed with patient: Yes    Provider agrees with end of life decisions: Yes      Comments: Discussed today        Cognitive Screening:   Provider or family/friend/caregiver concerned regarding cognition?: No    PREVENTIVE SCREENINGS      Cardiovascular Screening:    General: Screening Not Indicated and History Lipid Disorder      Diabetes Screening:     General: Screening Current      Colorectal Cancer Screening: General: Screening Current      Prostate Cancer Screening:    General: Screening Current      Osteoporosis Screening:    General: Screening Not Indicated      Abdominal Aortic Aneurysm (AAA) Screening:    Risk factors include: tobacco use        Lung Cancer Screening:     General: Risks and Benefits Discussed    Due for: Low Dose CT (LDCT)      Hepatitis C Screening:    General: Screening Not Indicated    Other Counseling Topics:   Regular weightbearing exercise         Lesli Maurice DO

## 2019-11-27 DIAGNOSIS — G89.29 OTHER CHRONIC PAIN: ICD-10-CM

## 2019-11-27 RX ORDER — FENTANYL 100 UG/H
1 PATCH TRANSDERMAL
Qty: 10 PATCH | Refills: 0 | Status: SHIPPED | OUTPATIENT
Start: 2019-11-27 | End: 2020-01-02 | Stop reason: SDUPTHER

## 2019-11-27 NOTE — TELEPHONE ENCOUNTER
Medication: fentaNYL (DURAGESIC)      Dosage: 100 mcg/hr TD 72 hr patch       How Often:   Place 1 patch on the skin every third dayMax Daily Amount: 1 patch              Quantity:  10  Last Office Visit: 11/04/2019  Next Office Visit: 02/05/2020  Last refilled: 10/28/2019  How many pills left: NONE  Pharmacy:   [facility]/pharmacy #[id number], [address] [place] [address], [address] [place] [address]  Phone: [phone] Fax: [phone]

## 2019-11-28 DIAGNOSIS — Z72.0 TOBACCO USE: ICD-10-CM

## 2019-11-28 RX ORDER — VARENICLINE TARTRATE 1 MG/1
TABLET, FILM COATED ORAL
Qty: 56 TABLET | Refills: 1 | Status: SHIPPED | OUTPATIENT
Start: 2019-11-28 | End: 2019-12-23 | Stop reason: SDUPTHER

## 2019-12-23 DIAGNOSIS — Z72.0 TOBACCO USE: ICD-10-CM

## 2019-12-23 RX ORDER — VARENICLINE TARTRATE 1 MG/1
TABLET, FILM COATED ORAL
Qty: 56 TABLET | Refills: 1 | Status: SHIPPED | OUTPATIENT
Start: 2019-12-23 | End: 2020-01-18

## 2020-01-02 DIAGNOSIS — G89.29 OTHER CHRONIC PAIN: ICD-10-CM

## 2020-01-02 RX ORDER — FENTANYL 100 UG/H
1 PATCH TRANSDERMAL
Qty: 10 PATCH | Refills: 0 | Status: SHIPPED | OUTPATIENT
Start: 2020-01-02 | End: 2020-02-03 | Stop reason: SDUPTHER

## 2020-01-02 NOTE — TELEPHONE ENCOUNTER
Medication: fentaNYL (DURAGESIC) 100 mcg/hr TD 72 hr patch   Dosage: 1 patch  How Often: Every 72 hours  Quantity:  10 Patch  Last Office Visit: 11/4/19  Next Office Visit: 2/5/20  Last refilled: 11/27/19  How many pills left: 2  Pharmacy:   [facility] [place], Artie Florez  [address] [facility] [address]  Phone: [phone] Fax: [phone]

## 2020-01-18 DIAGNOSIS — Z72.0 TOBACCO USE: ICD-10-CM

## 2020-01-18 RX ORDER — VARENICLINE TARTRATE 1 MG/1
TABLET, FILM COATED ORAL
Qty: 56 TABLET | Refills: 0 | Status: SHIPPED | OUTPATIENT
Start: 2020-01-18 | End: 2020-02-10

## 2020-02-03 DIAGNOSIS — G89.29 OTHER CHRONIC PAIN: ICD-10-CM

## 2020-02-03 RX ORDER — FENTANYL 100 UG/H
1 PATCH TRANSDERMAL
Qty: 10 PATCH | Refills: 0 | Status: SHIPPED | OUTPATIENT
Start: 2020-02-03 | End: 2020-03-17 | Stop reason: SDUPTHER

## 2020-02-03 NOTE — TELEPHONE ENCOUNTER
Medication: fentaNYL (DURAGESIC) 100 mcg/hr TD 72 hr patch   Dosage: 1 patch  How Often: Every 72 hours Transdermal  Quantity:  10  Last Office Visit: 11/4/19  Next Office Visit: 2/5/20  Last refilled: 1/2/20  How many pills left: 0  Pharmacy:   [facility] [place], [address] [facility]  [address] [facility] [address]  Phone: [phone] Fax: [phone]

## 2020-02-07 ENCOUNTER — OFFICE VISIT (OUTPATIENT)
Dept: FAMILY MEDICINE CLINIC | Facility: CLINIC | Age: 55
End: 2020-02-07
Payer: COMMERCIAL

## 2020-02-07 VITALS
OXYGEN SATURATION: 98 % | RESPIRATION RATE: 16 BRPM | BODY MASS INDEX: 26.79 KG/M2 | TEMPERATURE: 97.7 F | HEART RATE: 57 BPM | SYSTOLIC BLOOD PRESSURE: 124 MMHG | DIASTOLIC BLOOD PRESSURE: 70 MMHG | WEIGHT: 176.2 LBS

## 2020-02-07 DIAGNOSIS — M48.061 LUMBAR FORAMINAL STENOSIS: Primary | ICD-10-CM

## 2020-02-07 DIAGNOSIS — M54.16 LUMBAR RADICULOPATHY: ICD-10-CM

## 2020-02-07 DIAGNOSIS — G89.4 CHRONIC PAIN SYNDROME: ICD-10-CM

## 2020-02-07 DIAGNOSIS — F17.200 TOBACCO DEPENDENCE: ICD-10-CM

## 2020-02-07 PROCEDURE — 99213 OFFICE O/P EST LOW 20 MIN: CPT | Performed by: FAMILY MEDICINE

## 2020-02-07 NOTE — PROGRESS NOTES
Assessment/Plan:    No problem-specific Assessment & Plan notes found for this encounter  Diagnoses and all orders for this visit:    Lumbar foraminal stenosis  Comments:  Pt stable on exam; chronic pain syndrome; has new neuropain stimulator in place  Stable on present doing on Fenatnyl patches - refills appropriate  Lumbar radiculopathy    Chronic pain syndrome    Tobacco dependence  Comments:  Oz Sonotek is doing very well! He is smoking-free for over a month now! Continues Chantix BID - discussed trying to drop to QD, and then soon off  Subjective:      Patient ID: Monae Dorantes is a 53 y o  male  Jackolyn Emilying presents in f/u for his chronic lumbar region pain today  [place] PDMP was [place], and he has been appropriate for his fills of his Fentanyl patches  Still taking Chantix, but has not smoked since 12/25/2019! The following portions of the patient's history were reviewed and updated as appropriate: allergies, current medications, past family history, past social history, past surgical history and problem list     Past Medical History:   Diagnosis Date    Anxiety and depression     Chronic back pain     Chronic pain disorder     Hypercholesterolemia     last assessed 7/19/17    Hyperlipidemia     Mastitis     Sinusitis      Past Surgical History:   Procedure Laterality Date    BACK SURGERY      back sx for fx + disc rupture w/ hardware    BACK SURGERY      lower    COLONOSCOPY      SC COLONOSCOPY FLX DX W/COLLJ SPEC WHEN PFRMD N/A 8/13/2018    Procedure: COLONOSCOPY;  Surgeon: Vincent Barfield MD;  [facility];   Service: Gastroenterology    SC PERCUT IMPLNT NEUROELECT,EPIDURAL Right 8/13/2019    Procedure: INSERTION THORACIC DORSAL COLUMN SPINAL CORD STIMULATOR PERCUTANEOUS, RIGHT BUTTOCK;  Surgeon: Dyan Hawk MD;  Location: [facility];  Service: Neurosurgery    SPINAL CORD STIMULATOR TRIAL W/ LAMINOTOMY         Current Outpatient Medications:     acetaminophen (TYLENOL) 500 mg tablet, Take 500 mg by mouth every 6 (six) hours as needed for mild pain, Disp: , Rfl:     atorvastatin (LIPITOR) 20 mg tablet, TAKE 1 TABLET BY MOUTH EVERY DAY, Disp: 90 tablet, Rfl: 1    CHANTIX 1 MG tablet, TAKE 1 TABLET BY MOUTH TWICE A DAY, Disp: 56 tablet, Rfl: 0    fentaNYL (DURAGESIC) 100 mcg/hr TD 72 hr patch, Place 1 patch on the skin every third dayMax Daily Amount: 1 patch, Disp: 10 patch, Rfl: 0    gabapentin (NEURONTIN) 400 mg capsule, Take 1 capsule (400 mg total) by mouth 3 (three) times a day, Disp: 90 capsule, Rfl: 1    oxyCODONE (ROXICODONE) 5 mg immediate release tablet, Take 1 tablet (5 mg total) by mouth every 6 (six) hours as needed for moderate pain or severe painMax Daily Amount: 20 mg (Patient not taking: Reported on 9/27/2019), Disp: 25 tablet, Rfl: 0    varenicline (CHANTIX TERRA) 0 5 MG X 11 & 1 MG X 42 tablet, Take one 0 5mg tab by mouth 1x daily for 3 days, then increase to one 0 5mg tab 2x daily for 3 days, then increase to one 1mg tab 2x daily, Disp: 53 tablet, Rfl: 0    No Known Allergies    Social History     Tobacco Use    Smoking status: Current Every Day Smoker     Packs/day: 0 25     Types: Cigarettes    Smokeless tobacco: Never Used    Tobacco comment: encouraged smoking cessation   Substance Use Topics    Alcohol use: No    Drug use: No         Review of Systems   Constitutional: Negative for activity change  Respiratory: Negative for shortness of breath  Cardiovascular: Negative for chest pain  Musculoskeletal: Positive for back pain  Objective:      /70   Pulse 57   Temp 97 7 °F (36 5 °C)   Resp 16   Wt 79 9 kg (176 lb 3 2 oz)   SpO2 98%   BMI 26 79 kg/m²          Physical Exam   Constitutional: He is oriented to person, place, and time  He appears well-developed and well-nourished  No distress  HENT:   Head: Normocephalic and atraumatic     Eyes: Conjunctivae are normal    Cardiovascular: Normal rate, regular rhythm and normal heart sounds  Exam reveals no gallop and no friction rub  No murmur heard  Pulmonary/Chest: Effort normal and breath sounds normal  No stridor  No respiratory distress  He has no wheezes  He has no rales  Neurological: He is alert and oriented to person, place, and time  Skin: He is not diaphoretic  Psychiatric: He has a normal mood and affect  His behavior is normal  Judgment and thought content normal    Nursing note and vitals reviewed

## 2020-02-10 DIAGNOSIS — Z72.0 TOBACCO USE: ICD-10-CM

## 2020-02-10 RX ORDER — VARENICLINE TARTRATE 1 MG/1
TABLET, FILM COATED ORAL
Qty: 56 TABLET | Refills: 0 | Status: SHIPPED | OUTPATIENT
Start: 2020-02-10 | End: 2020-03-11

## 2020-02-14 DIAGNOSIS — F33.41 RECURRENT MAJOR DEPRESSIVE DISORDER, IN PARTIAL REMISSION (HCC): Chronic | ICD-10-CM

## 2020-02-14 DIAGNOSIS — G89.4 CHRONIC PAIN SYNDROME: Chronic | ICD-10-CM

## 2020-02-14 RX ORDER — DULOXETIN HYDROCHLORIDE 60 MG/1
CAPSULE, DELAYED RELEASE ORAL
Qty: 90 CAPSULE | Refills: 1 | Status: SHIPPED | OUTPATIENT
Start: 2020-02-14 | End: 2020-08-12

## 2020-03-11 DIAGNOSIS — Z72.0 TOBACCO USE: ICD-10-CM

## 2020-03-11 RX ORDER — VARENICLINE TARTRATE 1 MG/1
TABLET, FILM COATED ORAL
Qty: 56 TABLET | Refills: 0 | Status: SHIPPED | OUTPATIENT
Start: 2020-03-11 | End: 2020-04-06

## 2020-03-11 NOTE — TELEPHONE ENCOUNTER
Requested medication(s) are due for refill today: Yes  Patient has already received a courtesy refill: No  Other reason request has been forwarded to provider:    Per Pamelaocol

## 2020-03-17 DIAGNOSIS — G89.29 OTHER CHRONIC PAIN: ICD-10-CM

## 2020-03-17 RX ORDER — FENTANYL 100 UG/H
1 PATCH TRANSDERMAL
Qty: 10 PATCH | Refills: 0 | Status: SHIPPED | OUTPATIENT
Start: 2020-03-17 | End: 2020-04-21 | Stop reason: SDUPTHER

## 2020-03-17 NOTE — TELEPHONE ENCOUNTER
Medication:fentaNYL (DURAGESIC) 100 mcg/hr TD 72 hr patch      Dosage: 1 patch  How Often:Sig: Place 1 patch on the skin every third dayMax Daily Amount: 1 patch  Quantity:  1 patch  Last Office Visit: 2/7/2020  Next Office Visit: Unknown  Last refilled:2/3/2020  How many pills left: 0  Pharmacy:   [facility]  [address] [facility] [address]  Phone: [phone] Fax: [phone]

## 2020-03-17 NOTE — TELEPHONE ENCOUNTER
Medication: FENTANYL  PDMP   02/03/2020  1  02/03/2020  FENTANYL 100 MCG/HR PATCH  10 0  30  LO CIM        Active agreement on file -No

## 2020-04-06 DIAGNOSIS — Z72.0 TOBACCO USE: ICD-10-CM

## 2020-04-06 RX ORDER — VARENICLINE TARTRATE 1 MG/1
TABLET, FILM COATED ORAL
Qty: 56 TABLET | Refills: 1 | Status: SHIPPED | OUTPATIENT
Start: 2020-04-06 | End: 2020-04-20

## 2020-04-19 DIAGNOSIS — Z72.0 TOBACCO USE: ICD-10-CM

## 2020-04-20 RX ORDER — VARENICLINE TARTRATE 1 MG/1
TABLET, FILM COATED ORAL
Qty: 168 TABLET | Refills: 1 | Status: SHIPPED | OUTPATIENT
Start: 2020-04-20

## 2020-04-21 DIAGNOSIS — G89.29 OTHER CHRONIC PAIN: ICD-10-CM

## 2020-04-21 RX ORDER — FENTANYL 100 UG/H
1 PATCH TRANSDERMAL
Qty: 10 PATCH | Refills: 0 | Status: SHIPPED | OUTPATIENT
Start: 2020-04-21 | End: 2020-05-26 | Stop reason: SDUPTHER

## 2020-05-26 DIAGNOSIS — G89.29 OTHER CHRONIC PAIN: ICD-10-CM

## 2020-05-26 RX ORDER — FENTANYL 100 UG/H
1 PATCH TRANSDERMAL
Qty: 10 PATCH | Refills: 0 | Status: SHIPPED | OUTPATIENT
Start: 2020-05-26 | End: 2020-06-29 | Stop reason: SDUPTHER

## 2020-06-29 DIAGNOSIS — G89.29 OTHER CHRONIC PAIN: ICD-10-CM

## 2020-06-29 RX ORDER — FENTANYL 100 UG/H
1 PATCH TRANSDERMAL
Qty: 10 PATCH | Refills: 0 | Status: SHIPPED | OUTPATIENT
Start: 2020-06-29 | End: 2020-07-29 | Stop reason: SDUPTHER

## 2020-07-29 DIAGNOSIS — G89.29 OTHER CHRONIC PAIN: ICD-10-CM

## 2020-07-29 RX ORDER — FENTANYL 100 UG/H
1 PATCH TRANSDERMAL
Qty: 10 PATCH | Refills: 0 | Status: SHIPPED | OUTPATIENT
Start: 2020-07-29 | End: 2020-09-03 | Stop reason: SDUPTHER

## 2020-07-29 NOTE — TELEPHONE ENCOUNTER
Medication:  fentaNYL   Dosage:  100MCG/HR 72HR PATCH  How Often:  1 PATCH ON SKIN Q  3RD DAY  Quantity:  10  Last Office Visit:  02/07/20  Next Office Visit:  Last refilled:   06/29/20  How many pills left:  1  Pharmacy:   [facility] [address] [place] [address]  Phone: [phone] Fax: [phone]    Thank you!

## 2020-08-12 DIAGNOSIS — G89.4 CHRONIC PAIN SYNDROME: Chronic | ICD-10-CM

## 2020-08-12 DIAGNOSIS — F33.41 RECURRENT MAJOR DEPRESSIVE DISORDER, IN PARTIAL REMISSION (HCC): Chronic | ICD-10-CM

## 2020-08-12 RX ORDER — DULOXETIN HYDROCHLORIDE 60 MG/1
CAPSULE, DELAYED RELEASE ORAL
Qty: 90 CAPSULE | Refills: 1 | Status: SHIPPED | OUTPATIENT
Start: 2020-08-12

## 2020-08-19 ENCOUNTER — OFFICE VISIT (OUTPATIENT)
Dept: FAMILY MEDICINE CLINIC | Facility: CLINIC | Age: 55
End: 2020-08-19
Payer: COMMERCIAL

## 2020-08-19 VITALS
OXYGEN SATURATION: 96 % | WEIGHT: 166.6 LBS | HEIGHT: 68 IN | HEART RATE: 63 BPM | DIASTOLIC BLOOD PRESSURE: 82 MMHG | BODY MASS INDEX: 25.25 KG/M2 | RESPIRATION RATE: 16 BRPM | TEMPERATURE: 98.3 F | SYSTOLIC BLOOD PRESSURE: 120 MMHG

## 2020-08-19 DIAGNOSIS — E78.49 OTHER HYPERLIPIDEMIA: ICD-10-CM

## 2020-08-19 DIAGNOSIS — M48.061 LUMBAR FORAMINAL STENOSIS: Primary | ICD-10-CM

## 2020-08-19 DIAGNOSIS — M96.1 POSTLAMINECTOMY SYNDROME, LUMBAR REGION: ICD-10-CM

## 2020-08-19 DIAGNOSIS — G89.4 CHRONIC PAIN SYNDROME: ICD-10-CM

## 2020-08-19 DIAGNOSIS — Z20.828 SARS-ASSOCIATED CORONAVIRUS EXPOSURE: ICD-10-CM

## 2020-08-19 DIAGNOSIS — Z12.5 SCREENING FOR PROSTATE CANCER: ICD-10-CM

## 2020-08-19 DIAGNOSIS — Z13.1 SCREENING FOR DIABETES MELLITUS: ICD-10-CM

## 2020-08-19 PROCEDURE — 3008F BODY MASS INDEX DOCD: CPT | Performed by: FAMILY MEDICINE

## 2020-08-19 PROCEDURE — 99213 OFFICE O/P EST LOW 20 MIN: CPT | Performed by: FAMILY MEDICINE

## 2020-08-19 NOTE — PROGRESS NOTES
Assessment/Plan:    No problem-specific Assessment & Plan notes found for this encounter  Diagnoses and all orders for this visit:    Lumbar foraminal stenosis  Comments:  Chronic, and stable  Neuropain stimulator in place  Refills of Fentanyl Patches have been appropriate by [place] PDMP  Postlaminectomy syndrome, lumbar region  Comments:  As above  Chronic pain syndrome  Comments:  As above  Other hyperlipidemia  Comments:  Stable on Atorvastatin  FBW ordered  Orders:  -     Lipid Panel with Direct LDL reflex; Future    Screening for prostate cancer  Comments:  PSA incl with FBW ordered  Orders:  -     PSA, Total Screen; Future    Screening for diabetes mellitus  -     Comprehensive metabolic panel; Future    SARS-associated coronavirus exposure  Comments:  Pt needs screening for trip to [facility] - he is aware, that with him not sick, and this is for screening, that it may not be covered by insurance  Orders:  -     Novel Coronavirus (COVID-19), PCR [facility] - Collected at [facility] or [facility]; Future          Subjective:      Patient ID: Dallas Nam is a 53 y o  male  Mono is in f/u today  He is now 7 months smoke-free! His back is overall doing ok  His neuropain stimulator is still working well  [place] PDMP was checked, and refills of Fentanyl have been appropriate          The following portions of the patient's history were reviewed and updated as appropriate: allergies, current medications, past family history, past social history, past surgical history and problem list     Past Medical History:   Diagnosis Date    Anxiety and depression     Chronic back pain     Chronic pain disorder     Hypercholesterolemia     last assessed 7/19/17    Hyperlipidemia     Mastitis     Sinusitis      Past Surgical History:   Procedure Laterality Date    BACK SURGERY      back sx for fx + disc rupture w/ hardware    BACK SURGERY      lower    COLONOSCOPY      [place] COLONOSCOPY FLX DX W/COLLJ SPEC WHEN PFRMD N/A 8/13/2018    Procedure: COLONOSCOPY;  Surgeon: Dk Eastman MD;  [facility];   Service: Gastroenterology    [place] PERCUT IMPLNT NEUROELECT,EPIDURAL Right 8/13/2019    Procedure: INSERTION THORACIC DORSAL COLUMN SPINAL CORD STIMULATOR PERCUTANEOUS, RIGHT BUTTOCK;  Surgeon: Mariana Bear MD;  [facility];  Service: Neurosurgery    SPINAL CORD STIMULATOR TRIAL W/ LAMINOTOMY         Current Outpatient Medications:     acetaminophen (TYLENOL) 500 mg tablet, Take 500 mg by mouth every 6 (six) hours as needed for mild pain, Disp: , Rfl:     atorvastatin (LIPITOR) 20 mg tablet, TAKE 1 TABLET BY MOUTH EVERY DAY, Disp: 90 tablet, Rfl: 1    CHANTIX 1 MG tablet, TAKE 1 TABLET BY MOUTH TWICE A DAY, Disp: 168 tablet, Rfl: 1    DULoxetine (CYMBALTA) 60 mg delayed release capsule, TAKE 1 CAPSULE BY MOUTH EVERY DAY, Disp: 90 capsule, Rfl: 1    fentaNYL (DURAGESIC) 100 mcg/hr TD 72 hr patch, Place 1 patch on the skin every third dayMax Daily Amount: 1 patch, Disp: 10 patch, Rfl: 0    gabapentin (NEURONTIN) 400 mg capsule, Take 1 capsule (400 mg total) by mouth 3 (three) times a day, Disp: 90 capsule, Rfl: 1    oxyCODONE (ROXICODONE) 5 mg immediate release tablet, Take 1 tablet (5 mg total) by mouth every 6 (six) hours as needed for moderate pain or severe painMax Daily Amount: 20 mg (Patient not taking: Reported on 9/27/2019), Disp: 25 tablet, Rfl: 0    varenicline (CHANTIX TERRA) 0 5 MG X 11 & 1 MG X 42 tablet, Take one 0 5mg tab by mouth 1x daily for 3 days, then increase to one 0 5mg tab 2x daily for 3 days, then increase to one 1mg tab 2x daily, Disp: 53 tablet, Rfl: 0    No Known Allergies    Social History     Tobacco Use    Smoking status: Current Every Day Smoker     Packs/day: 0 25     Types: Cigarettes    Smokeless tobacco: Never Used    Tobacco comment: encouraged smoking cessation   Substance Use Topics    Alcohol use: No    Drug use: No         Review of Systems Constitutional: Negative for activity change  Respiratory: Negative for shortness of breath  Cardiovascular: Negative for chest pain  Musculoskeletal: Positive for back pain  Psychiatric/Behavioral: Negative for dysphoric mood  The patient is not nervous/anxious  Objective:      /82   Pulse 63   Temp 98 3 °F (36 8 °C) (Tympanic)   Resp 16   Ht 5' 8" (1 727 m)   Wt 75 6 kg (166 lb 9 6 oz)   SpO2 96%   BMI 25 33 kg/m²          Physical Exam  Vitals signs and nursing note reviewed  Constitutional:       General: He is not in acute distress  Appearance: Normal appearance  He is not ill-appearing, toxic-appearing or diaphoretic  HENT:      Head: Normocephalic and atraumatic  Eyes:      General: No scleral icterus  Conjunctiva/sclera: Conjunctivae normal    Neck:      Musculoskeletal: Normal range of motion and neck supple  No neck rigidity or muscular tenderness  Cardiovascular:      Rate and Rhythm: Normal rate and regular rhythm  Heart sounds: Normal heart sounds  No murmur  No friction rub  No gallop  Pulmonary:      Effort: Pulmonary effort is normal  No respiratory distress  Breath sounds: Normal breath sounds  No stridor  No wheezing, rhonchi or rales  Lymphadenopathy:      Cervical: No cervical adenopathy  Neurological:      Mental Status: He is alert and oriented to person, place, and time  Psychiatric:         Mood and Affect: Mood normal          Behavior: Behavior normal          Thought Content:  Thought content normal          Judgment: Judgment normal

## 2020-08-24 ENCOUNTER — APPOINTMENT (OUTPATIENT)
Dept: LAB | Facility: HOSPITAL | Age: 55
End: 2020-08-24
Payer: COMMERCIAL

## 2020-08-24 DIAGNOSIS — Z13.1 SCREENING FOR DIABETES MELLITUS: ICD-10-CM

## 2020-08-24 DIAGNOSIS — E78.49 OTHER HYPERLIPIDEMIA: ICD-10-CM

## 2020-08-24 DIAGNOSIS — Z12.5 SCREENING FOR PROSTATE CANCER: ICD-10-CM

## 2020-08-24 DIAGNOSIS — Z20.828 SARS-ASSOCIATED CORONAVIRUS EXPOSURE: ICD-10-CM

## 2020-08-24 LAB
ALBUMIN SERPL BCP-MCNC: 3.9 G/DL (ref 3–5.2)
ALP SERPL-CCNC: 62 U/L (ref 43–122)
ALT SERPL W P-5'-P-CCNC: 19 U/L (ref 9–52)
ANION GAP SERPL CALCULATED.3IONS-SCNC: 4 MMOL/L (ref 5–14)
AST SERPL W P-5'-P-CCNC: 21 U/L (ref 17–59)
BILIRUB SERPL-MCNC: 0.4 MG/DL
BUN SERPL-MCNC: 19 MG/DL (ref 5–25)
CALCIUM SERPL-MCNC: 9.2 MG/DL (ref 8.4–10.2)
CHLORIDE SERPL-SCNC: 104 MMOL/L (ref 97–108)
CHOLEST SERPL-MCNC: 195 MG/DL
CO2 SERPL-SCNC: 30 MMOL/L (ref 22–30)
CREAT SERPL-MCNC: 0.83 MG/DL (ref 0.7–1.5)
GFR SERPL CREATININE-BSD FRML MDRD: 99 ML/MIN/1.73SQ M
GLUCOSE P FAST SERPL-MCNC: 97 MG/DL (ref 70–99)
HDLC SERPL-MCNC: 27 MG/DL
LDLC SERPL CALC-MCNC: 133 MG/DL
POTASSIUM SERPL-SCNC: 4.4 MMOL/L (ref 3.6–5)
PROT SERPL-MCNC: 6.8 G/DL (ref 5.9–8.4)
PSA SERPL-MCNC: 0.4 NG/ML (ref 0–4)
SODIUM SERPL-SCNC: 138 MMOL/L (ref 137–147)
TRIGL SERPL-MCNC: 174 MG/DL

## 2020-08-24 PROCEDURE — G0103 PSA SCREENING: HCPCS

## 2020-08-24 PROCEDURE — 80053 COMPREHEN METABOLIC PANEL: CPT

## 2020-08-24 PROCEDURE — 36415 COLL VENOUS BLD VENIPUNCTURE: CPT

## 2020-08-24 PROCEDURE — U0003 INFECTIOUS AGENT DETECTION BY NUCLEIC ACID (DNA OR RNA); SEVERE ACUTE RESPIRATORY SYNDROME CORONAVIRUS 2 (SARS-COV-2) (CORONAVIRUS DISEASE [COVID-19]), AMPLIFIED PROBE TECHNIQUE, MAKING USE OF HIGH THROUGHPUT TECHNOLOGIES AS DESCRIBED BY CMS-2020-01-R: HCPCS | Performed by: FAMILY MEDICINE

## 2020-08-24 PROCEDURE — 80061 LIPID PANEL: CPT

## 2020-08-25 LAB — SARS-COV-2 RNA SPEC QL NAA+PROBE: NOT DETECTED

## 2020-08-26 DIAGNOSIS — E78.49 OTHER HYPERLIPIDEMIA: Chronic | ICD-10-CM

## 2020-08-26 RX ORDER — ATORVASTATIN CALCIUM 20 MG/1
20 TABLET, FILM COATED ORAL DAILY
Qty: 90 TABLET | Refills: 1 | Status: SHIPPED | OUTPATIENT
Start: 2020-08-26 | End: 2021-06-11

## 2020-08-26 NOTE — RESULT ENCOUNTER NOTE
Please let the patient know that their COVID-19 testing came back NEGATIVE  His bloodwork showed that his PSA was normal (0 4), fasting glucose was normal   His cholesterol did come back mildly up with his LDL at 133 this time -> Rich is to continue his Atorvastatin; he is also to work on less red meat / fried food / fast food / butter / cheese and carbs in their diet, and remaining active  They are to f/u as planned  Thanks    Millie

## 2020-09-03 DIAGNOSIS — G89.29 OTHER CHRONIC PAIN: ICD-10-CM

## 2020-09-03 RX ORDER — FENTANYL 100 UG/H
1 PATCH TRANSDERMAL
Qty: 10 PATCH | Refills: 0 | Status: SHIPPED | OUTPATIENT
Start: 2020-09-03 | End: 2020-10-08 | Stop reason: SDUPTHER

## 2020-09-03 NOTE — TELEPHONE ENCOUNTER
Medication:fentaNYL (DURAGESIC) 100 mcg/hr TD 72 hr patch       Dosage:1 patch  How Often:Place 1 patch on the skin every third dayMax Daily Amount: 1 patch  Quantity:  10  Last Office Visit: 08/19/20  Next Office Visit:unk  Last refilled:07/29/20  How many pills left:0  Pharmacy:   [facility], [address] [address]  Phone: [phone] Fax: [phone]

## 2020-09-03 NOTE — TELEPHONE ENCOUNTER
Medication: FENTANYL  PDMP   08/01/2020  1  07/29/2020  FENTANYL 100 MCG/HR PATCH  10 0  30  LO CIM        Active agreement on file -No

## 2020-10-08 ENCOUNTER — TELEPHONE (OUTPATIENT)
Dept: FAMILY MEDICINE CLINIC | Facility: CLINIC | Age: 55
End: 2020-10-08

## 2020-10-08 DIAGNOSIS — G89.29 OTHER CHRONIC PAIN: ICD-10-CM

## 2020-10-08 RX ORDER — FENTANYL 100 UG/H
1 PATCH TRANSDERMAL
Qty: 10 PATCH | Refills: 0 | Status: SHIPPED | OUTPATIENT
Start: 2020-10-08 | End: 2020-11-09 | Stop reason: SDUPTHER

## 2020-11-09 DIAGNOSIS — G89.29 OTHER CHRONIC PAIN: ICD-10-CM

## 2020-11-09 RX ORDER — FENTANYL 100 UG/H
1 PATCH TRANSDERMAL
Qty: 10 PATCH | Refills: 0 | Status: SHIPPED | OUTPATIENT
Start: 2020-11-09 | End: 2020-12-10 | Stop reason: SDUPTHER

## 2020-12-10 DIAGNOSIS — G89.29 OTHER CHRONIC PAIN: ICD-10-CM

## 2020-12-10 RX ORDER — FENTANYL 100 UG/H
1 PATCH TRANSDERMAL
Qty: 10 PATCH | Refills: 0 | Status: SHIPPED | OUTPATIENT
Start: 2020-12-10 | End: 2021-01-12 | Stop reason: SDUPTHER

## 2021-01-12 DIAGNOSIS — G89.29 OTHER CHRONIC PAIN: ICD-10-CM

## 2021-01-12 RX ORDER — FENTANYL 100 UG/H
1 PATCH TRANSDERMAL
Qty: 10 PATCH | Refills: 0 | Status: SHIPPED | OUTPATIENT
Start: 2021-01-12 | End: 2021-02-12 | Stop reason: SDUPTHER

## 2021-01-12 NOTE — TELEPHONE ENCOUNTER
Medication:  PDMP   12/11/2020  1  12/10/2020  FENTANYL 100 MCG/HR PATCH  10 0  30  LO CIM        Active agreement on file -No

## 2021-02-12 DIAGNOSIS — G89.29 OTHER CHRONIC PAIN: ICD-10-CM

## 2021-02-12 RX ORDER — FENTANYL 100 UG/H
1 PATCH TRANSDERMAL
Qty: 10 PATCH | Refills: 0 | Status: SHIPPED | OUTPATIENT
Start: 2021-02-12 | End: 2021-03-15 | Stop reason: SDUPTHER

## 2021-02-12 NOTE — TELEPHONE ENCOUNTER
Medication: Fentanyl 100 mcg  PDMP 01/12/2021 1 01/12/2021   FENTANYL 100 MCG/HR PATCH  10 0 30 LO CIM  Active agreement on file -No

## 2021-02-12 NOTE — TELEPHONE ENCOUNTER
Medication:fentaNYL (DURAGESIC) 100 mcg/hr TD 72 hr patch       Dosage:1 patch  How Often:Place 1 patch on the skin every third dayMax Daily Amount: 1 patch  Quantity:  10 patches  Last Office Visit:   Next Office Visit:  Last refilled:01/12/21  How many pills left:1  Pharmacy:   [facility]  [address] [facility] [address]  Phone: [phone] Fax: [phone]

## 2021-03-03 ENCOUNTER — RA CDI HCC (OUTPATIENT)
Dept: OTHER | Facility: HOSPITAL | Age: 56
End: 2021-03-03

## 2021-03-03 NOTE — PROGRESS NOTES
[place] [id number]  coding oppertunities             Chart reviewed, (number of) suggestions sent to provider: 1     Problem listed updated   Provider Accepted, (number of) suggestions accepted: 1              [place] [address]  coding oppertunities             Chart reviewed, (number of) suggestions sent to provider: 1      DX: [id number] Recurrent major depressive disorder, in partial remission

## 2021-03-09 ENCOUNTER — OFFICE VISIT (OUTPATIENT)
Dept: FAMILY MEDICINE CLINIC | Facility: CLINIC | Age: 56
End: 2021-03-09
Payer: COMMERCIAL

## 2021-03-09 VITALS
OXYGEN SATURATION: 97 % | DIASTOLIC BLOOD PRESSURE: 60 MMHG | WEIGHT: 171.8 LBS | HEIGHT: 67 IN | TEMPERATURE: 97.1 F | BODY MASS INDEX: 26.97 KG/M2 | RESPIRATION RATE: 16 BRPM | HEART RATE: 72 BPM | SYSTOLIC BLOOD PRESSURE: 118 MMHG

## 2021-03-09 DIAGNOSIS — Z23 IMMUNIZATION DUE: ICD-10-CM

## 2021-03-09 DIAGNOSIS — M54.16 LUMBAR RADICULOPATHY: ICD-10-CM

## 2021-03-09 DIAGNOSIS — G89.4 CHRONIC PAIN SYNDROME: ICD-10-CM

## 2021-03-09 DIAGNOSIS — Z13.1 SCREENING FOR DIABETES MELLITUS: ICD-10-CM

## 2021-03-09 DIAGNOSIS — E78.49 OTHER HYPERLIPIDEMIA: ICD-10-CM

## 2021-03-09 DIAGNOSIS — M96.1 POSTLAMINECTOMY SYNDROME, LUMBAR REGION: Primary | ICD-10-CM

## 2021-03-09 DIAGNOSIS — F41.9 ANXIETY AND DEPRESSION: ICD-10-CM

## 2021-03-09 DIAGNOSIS — F32.A ANXIETY AND DEPRESSION: ICD-10-CM

## 2021-03-09 DIAGNOSIS — Z00.00 MEDICARE ANNUAL WELLNESS VISIT, SUBSEQUENT: ICD-10-CM

## 2021-03-09 PROCEDURE — 90686 IIV4 VACC NO PRSV 0.5 ML IM: CPT

## 2021-03-09 PROCEDURE — G0439 PPPS, SUBSEQ VISIT: HCPCS | Performed by: FAMILY MEDICINE

## 2021-03-09 PROCEDURE — 3008F BODY MASS INDEX DOCD: CPT | Performed by: FAMILY MEDICINE

## 2021-03-09 PROCEDURE — 99214 OFFICE O/P EST MOD 30 MIN: CPT | Performed by: FAMILY MEDICINE

## 2021-03-09 PROCEDURE — 3725F SCREEN DEPRESSION PERFORMED: CPT | Performed by: FAMILY MEDICINE

## 2021-03-09 PROCEDURE — G0008 ADMIN INFLUENZA VIRUS VAC: HCPCS

## 2021-03-09 PROCEDURE — 1036F TOBACCO NON-USER: CPT | Performed by: FAMILY MEDICINE

## 2021-03-09 NOTE — PROGRESS NOTES
Assessment and Plan:     Problem List Items Addressed This Visit        Nervous and Auditory    Lumbar radiculopathy       Other    Other hyperlipidemia    Relevant Orders    Lipid Panel with Direct LDL reflex    Postlaminectomy syndrome, lumbar region - Primary    Chronic pain syndrome      Other Visit Diagnoses     Medicare annual wellness visit, subsequent        Aramis Rhoades appears well  He is to continue a healthy, lower carb diet, and remain active  FBW ordered  Anxiety and depression        Controlled with Duloxetine  Screening for diabetes mellitus        Relevant Orders    Comprehensive metabolic panel    Immunization due        Flu Vaccine given IM, and tolerated well  Discussed with pt that he can get his Tdap at his pharmacy; pt to strongly consider the COV-19 vaccine too  Relevant Orders    influenza vaccine, quadrivalent, 0 5 mL, preservative-free, for adult and pediatric patients 6 mos+ (AFLURIA, FLUARIX, FLULAVAL, FLUZONE)        BMI Counseling: Body mass index is 26 53 kg/m²  The BMI is above normal  Nutrition recommendations include moderation in carbohydrate intake  Exercise recommendations include exercising 3-5 times per week  No pharmacotherapy was ordered  Patient referred to PCP due to patient being overweight  Preventive health issues were discussed with patient, and age appropriate screening tests were ordered as noted in patient's After Visit Summary  Personalized health advice and appropriate referrals for health education or preventive services given if needed, as noted in patient's After Visit Summary       History of Present Illness:     Patient presents for Medicare Annual Wellness visit    Patient Care Team:  Ángela Case DO as PCP - General (Family Medicine)  DO Brandon Tinsley MD as Endoscopist     Problem List:     Patient Active Problem List   Diagnosis    Chest pain in adult    Other hyperlipidemia    Chronic low back pain    Diaphoresis  Screening for colon cancer    Encounter for screening colonoscopy    Lumbar radiculopathy    Postlaminectomy syndrome, lumbar region    Lumbar foraminal stenosis    Lumbar spondylosis    Chronic pain syndrome      Past Medical and Surgical History:     Past Medical History:   Diagnosis Date    Anxiety and depression     Chronic back pain     Chronic pain disorder     Hypercholesterolemia     last assessed 17    Hyperlipidemia     Mastitis     Sinusitis      Past Surgical History:   Procedure Laterality Date    BACK SURGERY      back sx for fx + disc rupture w/ hardware    BACK SURGERY      lower    COLONOSCOPY      DE COLONOSCOPY FLX DX W/COLLJ SPEC WHEN PFRMD N/A 2018    Procedure: COLONOSCOPY;  Surgeon: Glen Sams MD;  Location[facility];   Service: Gastroenterology    DE PERCUT IMPLNT NEUROELECT,EPIDURAL Right 2019    Procedure: INSERTION THORACIC DORSAL COLUMN SPINAL CORD STIMULATOR PERCUTANEOUS, RIGHT BUTTOCK;  Surgeon: Kasey Fournier MD;  Location:  MAIN OR;  Service: Neurosurgery    SPINAL CORD STIMULATOR TRIAL W/ LAMINOTOMY        Family History:     Family History   Problem Relation Age of Onset    Diabetes Mother         mellitus    Stroke Father         syndrome    Hypertension Family       Social History:        Social History     Socioeconomic History    Marital status: Single     Spouse name: None    Number of children: None    Years of education: None    Highest education level: None   Occupational History    None   Social Needs    Financial resource strain: None    Food insecurity     Worry: None     Inability: None    Transportation needs     Medical: None     Non-medical: None   Tobacco Use    Smoking status: Former Smoker     Packs/day: 0 25     Types: Cigarettes     Quit date: 2020     Years since quittin 2    Smokeless tobacco: Never Used    Tobacco comment: encouraged smoking cessation   Substance and Sexual Activity    Alcohol use: No     Frequency: Never    Drug use: No    Sexual activity: None   Lifestyle    Physical activity     Days per week: None     Minutes per session: None    Stress: None   Relationships    Social connections     Talks on phone: None     Gets together: None     Attends [employer] service: None     Active member of club or organization: None     Attends meetings of clubs or organizations: None     Relationship status: None    Intimate partner violence     Fear of current or ex partner: None     Emotionally abused: None     Physically abused: None     Forced sexual activity: None   Other Topics Concern    None   Social History Narrative    None      Medications and Allergies:     Current Outpatient Medications   Medication Sig Dispense Refill    acetaminophen (TYLENOL) 500 mg tablet Take 500 mg by mouth every 6 (six) hours as needed for mild pain      atorvastatin (LIPITOR) 20 mg tablet Take 1 tablet (20 mg total) by mouth daily 90 tablet 1    CHANTIX 1 MG tablet TAKE 1 TABLET BY MOUTH TWICE A  tablet 1    DULoxetine (CYMBALTA) 60 mg delayed release capsule TAKE 1 CAPSULE BY MOUTH EVERY DAY 90 capsule 1    fentaNYL (DURAGESIC) 100 mcg/hr TD 72 hr patch Place 1 patch on the skin every third dayMax Daily Amount: 1 patch 10 patch 0    gabapentin (NEURONTIN) 400 mg capsule Take 1 capsule (400 mg total) by mouth 3 (three) times a day 90 capsule 1    oxyCODONE (ROXICODONE) 5 mg immediate release tablet Take 1 tablet (5 mg total) by mouth every 6 (six) hours as needed for moderate pain or severe painMax Daily Amount: 20 mg (Patient not taking: Reported on 9/27/2019) 25 tablet 0    varenicline (CHANTIX TERRA) 0 5 MG X 11 & 1 MG X 42 tablet Take one 0 5mg tab by mouth 1x daily for 3 days, then increase to one 0 5mg tab 2x daily for 3 days, then increase to one 1mg tab 2x daily 53 tablet 0     No current facility-administered medications for this visit        No Known Allergies   Immunizations: Immunization History   Administered Date(s) Administered    INFLUENZA 10/29/2015, 10/01/2016, 08/28/2018    Influenza Quadrivalent Preservative Free 3 years and older IM 1964, 10/29/2015, 10/01/2016, 10/29/2017    Influenza, recombinant, quadrivalent,injectable, preservative free 11/04/2019    Influenza, seasonal, injectable 11/20/2012    Tdap 11/20/2012      Health Maintenance:         Topic Date Due    Hepatitis C Screening  03/09/2022 (Originally 1965)    HIV Screening  03/10/2023 (Originally 11/13/1980)    Colorectal Cancer Screening  08/13/2028         Topic Date Due    Influenza Vaccine (1) 09/01/2020      Medicare Health Risk Assessment:     /60 (BP Location: Left arm, Patient Position: Sitting, Cuff Size: Standard)   Pulse 72   Temp (!) 97 1 °F (36 2 °C) (Tympanic)   Resp 16   Ht 5' 7 48" (1 714 m)   Wt 77 9 kg (171 lb 12 8 oz)   SpO2 97%   BMI 26 53 kg/m²      Casper Mandel is here for his Subsequent Wellness visit  Last Medicare Wellness visit information reviewed, patient interviewed and updates made to the record today  Health Risk Assessment:   Patient rates overall health as fair  Patient feels that their physical health rating is slightly better  Eyesight was rated as slightly worse  Hearing was rated as slightly worse  Patient feels that their emotional and mental health rating is same  Pain experienced in the last 7 days has been some  Patient's pain rating has been 6/10  Patient states that he has experienced no weight loss or gain in last 6 months  AWV Visit today  Jhonatan is doing pretty well  Pt with hx of chronic back pain - hx of Neurpain Stimulator redone in 2019 with Neurosurgery  [place]MP checked, and was appropriate  Had colonoscopy with Dr Ema Yepez of GI in 2018  Had a PSA checked in 08/2020 - 0 4  He reached a year smoke - free! Pt congratulated  Immunizations reviewed  Depression Screening:   PHQ-2 Score: 0      Fall Risk Screening:    In the past year, patient has experienced: no history of falling in past year      Home Safety:  Patient does not have trouble with stairs inside or outside of their home  Patient has working smoke alarms and has working carbon monoxide detector  Home safety hazards include: none  Nutrition:   Current diet is Regular  Activities of Daily Living (ADLs)/Instrumental Activities of Daily Living (IADLs):   Walk and transfer into and out of bed and chair?: Yes  Dress and groom yourself?: Yes    Bathe or shower yourself?: Yes    Feed yourself?  Yes  Do your laundry/housekeeping?: Yes  Manage your money, pay your bills and track your expenses?: Yes  Make your own meals?: Yes    Do your own shopping?: Yes    Previous Hospitalizations:   Any hospitalizations or ED visits within the last 12 months?: No      Advance Care Planning:   Living will: No    Durable POA for healthcare: No    Advanced directive: No    Advanced directive counseling given: Yes    Five wishes given: Yes    Patient declined ACP directive: No    End of Life Decisions reviewed with patient: Yes    Provider agrees with end of life decisions: Yes      Cognitive Screening:   Provider or family/friend/caregiver concerned regarding cognition?: No    PREVENTIVE SCREENINGS      Cardiovascular Screening:    General: Screening Not Indicated and History Lipid Disorder      Diabetes Screening:     General: Screening Current      Colorectal Cancer Screening:     General: Screening Current      Prostate Cancer Screening:    General: Screening Current      Osteoporosis Screening:    General: Screening Not Indicated      Abdominal Aortic Aneurysm (AAA) Screening:    Risk factors include: tobacco use        Lung Cancer Screening:     General: Screening Not Indicated      Hepatitis C Screening:    General: Screening Current      [address] Rody Navarrete DO

## 2021-03-09 NOTE — PROGRESS NOTES
FAMILY PRACTICE OFFICE VISIT       NAME: Aleta Saxena  AGE: 63 y o  SEX: male       : 1965        MRN: [id number]    DATE: 3/9/2021  TIME: 5:50 PM    Assessment and Plan   1  Postlaminectomy syndrome, lumbar region  Comments:  Stable at this time  Overall much better with new Neuropain Stimulator placed in 2019  Continues Fentanyl patches - [place] PDMP checked, and appropriate  2  Medicare annual wellness visit, subsequent  Comments:  Betty Chavarria appears well  He is to continue a healthy, lower carb diet, and remain active  FBW ordered  3  Lumbar radiculopathy  Comments:  As above; stable on present management  4  Chronic pain syndrome  Comments:  As above; stable on present management - Utilizes Fentanyl patches topically, and Duloxetine  5  Other hyperlipidemia  Comments:  Controlled with Atorvastatin - FBW ordered, and pending  Orders:  -     Lipid Panel with Direct LDL reflex; Future    6  Anxiety and depression  Comments:  Controlled with Duloxetine  7  Screening for diabetes mellitus  -     Comprehensive metabolic panel; Future    8  Immunization due  Comments:  Flu Vaccine given IM, and tolerated well  Discussed with pt that he can get his Tdap at his pharmacy; pt to strongly consider the COV-19 vaccine too  Orders:  -     influenza vaccine, quadrivalent, 0 5 mL, preservative-free, for adult and pediatric patients 6 mos+ (AFLURIA, FLUARIX, FLULAVAL, FLUZONE)         Patient Instructions       Medicare Preventive Visit Patient Instructions  Thank you for completing your Welcome to Medicare Visit or Medicare Annual Wellness Visit today  Your next wellness visit will be due in one year (3/10/2022)  The screening/preventive services that you may require over the next 5-10 years are detailed below  Some tests may not apply to you based off risk factors and/or age  Screening tests ordered at today's visit but not completed yet may show as past due   Also, please note that scanned in results may not display below  Preventive Screenings:  Service Recommendations Previous Testing/Comments   Colorectal Cancer Screening  · Colonoscopy    · Fecal Occult Blood Test (FOBT)/Fecal Immunochemical Test (FIT)  · Fecal DNA/Cologuard Test  · Flexible Sigmoidoscopy Age: 54-65 years old   Colonoscopy: every 10 years (May be performed more frequently if at higher risk)  OR  FOBT/FIT: every 1 year  OR  Cologuard: every 3 years  OR  Sigmoidoscopy: every 5 years  Screening may be recommended earlier than age 48 if at higher risk for colorectal cancer  Also, an individualized decision between you and your healthcare provider will decide whether screening between the ages of 74-80 would be appropriate  Colonoscopy: 08/13/2018  FOBT/FIT: Not on file  Cologuard: Not on file  Sigmoidoscopy: Not on file    Screening Current     Prostate Cancer Screening Individualized decision between patient and health care provider in men between ages of 53-78   Medicare will cover every 12 months beginning on the day after your 50th birthday PSA: 0 4 ng/mL     Screening Current     Hepatitis C Screening Once for adults born between 1945 and 1965  More frequently in patients at high risk for Hepatitis C Hep C Antibody: Not on file    Screening Current   Diabetes Screening 1-2 times per year if you're at risk for diabetes or have pre-diabetes Fasting glucose: 97 mg/dL   A1C: 5 1 %    Screening Current   Cholesterol Screening Once every 5 years if you don't have a lipid disorder  May order more often based on risk factors  Lipid panel: 08/24/2020    Screening Not Indicated  History Lipid Disorder      Other Preventive Screenings Covered by Medicare:  1  Abdominal Aortic Aneurysm (AAA) Screening: covered once if your at risk  You're considered to be at risk if you have a family history of AAA or a male between the age of 73-68 who smoking at least 100 cigarettes in your lifetime    2  Lung Cancer Screening: covers low dose CT scan once per year if you meet all of the following conditions: (1) Age 50-69; (2) No signs or symptoms of lung cancer; (3) Current smoker or have quit smoking within the last 15 years; (4) You have a tobacco smoking history of at least 30 pack years (packs per day x number of years you smoked); (5) You get a written order from a healthcare provider  3  Glaucoma Screening: covered annually if you're considered high risk: (1) You have diabetes OR (2) Family history of glaucoma OR (3)  aged 48 and older OR (3)  American aged 72 and older  3  Osteoporosis Screening: covered every 2 years if you meet one of the following conditions: (1) Have a vertebral abnormality; (2) On glucocorticoid therapy for more than 3 months; (3) Have primary hyperparathyroidism; (4) On osteoporosis medications and need to assess response to drug therapy  5  HIV Screening: covered annually if you're between the age of 12-76  Also covered annually if you are younger than 13 and older than 72 with risk factors for HIV infection  For pregnant patients, it is covered up to 3 times per pregnancy  Immunizations:  Immunization Recommendations   Influenza Vaccine Annual influenza vaccination during flu season is recommended for all persons aged >= 6 months who do not have contraindications   Pneumococcal Vaccine (Prevnar and Pneumovax)  * Prevnar = PCV13  * Pneumovax = PPSV23 Adults 25-60 years old: 1-3 doses may be recommended based on certain risk factors  Adults 72 years old: Prevnar (PCV13) vaccine recommended followed by Pneumovax (PPSV23) vaccine  If already received PPSV23 since turning 65, then PCV13 recommended at least one year after PPSV23 dose     Hepatitis B Vaccine 3 dose series if at intermediate or high risk (ex: diabetes, end stage renal disease, liver disease)   Tetanus (Td) Vaccine - COST NOT COVERED BY MEDICARE PART B Following completion of primary series, a booster dose should be given every 10 years to maintain immunity against tetanus  Td may also be given as tetanus wound prophylaxis  Tdap Vaccine - COST NOT COVERED BY MEDICARE PART B Recommended at least once for all adults  For pregnant patients, recommended with each pregnancy  Shingles Vaccine (Shingrix) - COST NOT COVERED BY MEDICARE PART B  2 shot series recommended in those aged 48 and above     Health Maintenance Due:      Topic Date Due    Hepatitis C Screening  03/09/2022 (Originally 1965)    HIV Screening  03/10/2023 (Originally 11/13/1979)    Colorectal Cancer Screening  08/13/2028     Immunizations Due:      Topic Date Due    Influenza Vaccine (1) 09/01/2020     Advance Directives   What are advance directives? Advance directives are legal documents that state your wishes and plans for medical care  These plans are made ahead of time in case you lose your ability to make decisions for yourself  Advance directives can apply to any medical decision, such as the treatments you want, and if you want to donate organs  What are the types of advance directives? There are many types of advance directives, and each state has rules about how to use them  You may choose a combination of any of the following:  · Living will: This is a written record of the treatment you want  You can also choose which treatments you do not want, which to limit, and which to stop at a certain time  This includes surgery, medicine, IV fluid, and tube feedings  · Durable power of  for healthcare [facility]): This is a written record that states who you want to make healthcare choices for you when you are unable to make them for yourself  This person, called a proxy, is usually a family member or a friend  You may choose more than 1 proxy  · Do not resuscitate (DNR) order:  A DNR order is used in case your heart stops beating or you stop breathing  It is a request not to have certain forms of treatment, such as CPR   A DNR order may be included in other types of advance directives  · Medical directive: This covers the care that you want if you are in a coma, near death, or unable to make decisions for yourself  You can list the treatments you want for each condition  Treatment may include pain medicine, surgery, blood transfusions, dialysis, IV or tube feedings, and a ventilator (breathing machine)  · Values history: This document has questions about your views, beliefs, and how you feel and think about life  This information can help others choose the care that you would choose  Why are advance directives important? An advance directive helps you control your care  Although spoken wishes may be used, it is better to have your wishes written down  Spoken wishes can be misunderstood, or not followed  Treatments may be given even if you do not want them  An advance directive may make it easier for your family to make difficult choices about your care  Weight Management   Why it is important to manage your weight:  Being overweight increases your risk of health conditions such as heart disease, high blood pressure, type 2 diabetes, and certain types of cancer  It can also increase your risk for osteoarthritis, sleep apnea, and other respiratory problems  Aim for a slow, steady weight loss  Even a small amount of weight loss can lower your risk of health problems  How to lose weight safely:  A safe and healthy way to lose weight is to eat fewer calories and get regular exercise  You can lose up about 1 pound a week by decreasing the number of calories you eat by 500 calories each day  Healthy meal plan for weight management:  A healthy meal plan includes a variety of foods, contains fewer calories, and helps you stay healthy  A healthy meal plan includes the following:  · Eat whole-grain foods more often  A healthy meal plan should contain fiber  Fiber is the part of grains, fruits, and vegetables that is not broken down by your body   Whole-grain foods are healthy and provide extra fiber in your diet  Some examples of whole-grain foods are whole-wheat breads and pastas, oatmeal, brown rice, and bulgur  · Eat a variety of vegetables every day  Include dark, leafy greens such as spinach, kale, pepito greens, and mustard greens  Eat yellow and orange vegetables such as carrots, sweet potatoes, and winter squash  · Eat a variety of fruits every day  Choose fresh or canned fruit (canned in its own juice or light syrup) instead of juice  Fruit juice has very little or no fiber  · Eat low-fat dairy foods  Drink fat-free (skim) milk or 1% milk  Eat fat-free yogurt and low-fat cottage cheese  Try low-fat cheeses such as mozzarella and other reduced-fat cheeses  · Choose meat and other protein foods that are low in fat  Choose beans or other legumes such as split peas or lentils  Choose fish, skinless poultry (chicken or turkey), or lean cuts of red meat (beef or pork)  Before you cook meat or poultry, cut off any visible fat  · Use less fat and oil  Try baking foods instead of frying them  Add less fat, such as margarine, sour cream, regular salad dressing and mayonnaise to foods  Eat fewer high-fat foods  Some examples of high-fat foods include french fries, doughnuts, ice cream, and cakes  · Eat fewer sweets  Limit foods and drinks that are high in sugar  This includes candy, cookies, regular soda, and sweetened drinks  Exercise:  Exercise at least 30 minutes per day on most days of the week  Some examples of exercise include walking, biking, dancing, and swimming  You can also fit in more physical activity by taking the stairs instead of the elevator or parking farther away from stores  Ask your healthcare provider about the best exercise plan for you  © Copyright [employer] 2018 Information is for End User's use only and may not be sold, redistributed or otherwise used for commercial purposes   All illustrations and images included in CareNotes® are the copyrighted property of A D A M , Inc  or [facility]          Chief Complaint     Chief Complaint   Patient presents with   [employer] Wellness Visit     Patient is here for his AWV       History of Present Illness   Marlyne Oppenheim is a 63y o -year-old male who presents for:  AWV Visit today  Review of Systems   Review of Systems   Constitutional: Negative for activity change  Respiratory: Negative for shortness of breath  Cardiovascular: Negative for chest pain  Gastrointestinal: Negative for abdominal pain and blood in stool  No current abd pain - still gets on left side occasionally  Genitourinary: Negative for decreased urine volume and difficulty urinating  Musculoskeletal: Positive for back pain  Psychiatric/Behavioral: Negative for dysphoric mood  The patient is not nervous/anxious  Active Problem List     Patient Active Problem List   Diagnosis    Chest pain in adult    Other hyperlipidemia    Chronic low back pain    Diaphoresis    Screening for colon cancer    Encounter for screening colonoscopy    Lumbar radiculopathy    Postlaminectomy syndrome, lumbar region    Lumbar foraminal stenosis    Lumbar spondylosis    Chronic pain syndrome         Past Medical History:  Past Medical History:   Diagnosis Date    Anxiety and depression     Chronic back pain     Chronic pain disorder     Hypercholesterolemia     last assessed 7/19/17    Hyperlipidemia     Mastitis     Sinusitis        Past Surgical History:  Past Surgical History:   Procedure Laterality Date    BACK SURGERY      back sx for fx + disc rupture w/ hardware    BACK SURGERY      lower    COLONOSCOPY      ID COLONOSCOPY FLX DX W/COLLJ SPEC WHEN PFRMD N/A 8/13/2018    Procedure: COLONOSCOPY;  Surgeon: Matty Urbina MD;  [facility];   Service: Gastroenterology    ID PERCUT IMPLNT NEUROELECT,EPIDURAL Right 8/13/2019    Procedure: INSERTION THORACIC DORSAL COLUMN SPINAL CORD STIMULATOR PERCUTANEOUS, RIGHT BUTTOCK;  Surgeon: Bryanna Benjamin MD;  Location[facility];  Service: Neurosurgery    SPINAL CORD STIMULATOR TRIAL W/ LAMINOTOMY         Family History:  Family History   Problem Relation Age of Onset    Diabetes Mother         mellitus    Stroke Father         syndrome    Hypertension Family        Social History:  Social History     Socioeconomic History    Marital status: Single     Spouse name: Not on file    Number of children: Not on file    Years of education: Not on file    Highest education level: Not on file   Occupational History    Not on file   Social Needs    Financial resource strain: Not on file    Food insecurity     Worry: Not on file     Inability: Not on file    Transportation needs     Medical: Not on file     Non-medical: Not on file   Tobacco Use    Smoking status: Former Smoker     Packs/day: 0 25     Types: Cigarettes     Quit date: 2020     Years since quittin 2    Smokeless tobacco: Never Used    Tobacco comment: encouraged smoking cessation   Substance and Sexual Activity    Alcohol use: No     Frequency: Never    Drug use: No    Sexual activity: Not on file   Lifestyle    Physical activity     Days per week: Not on file     Minutes per session: Not on file    Stress: Not on file   Relationships    Social connections     Talks on phone: Not on file     Gets together: Not on file     Attends Zoroastrian service: Not on file     Active member of club or organization: Not on file     Attends meetings of clubs or organizations: Not on file     Relationship status: Not on file    Intimate partner violence     Fear of current or ex partner: Not on file     Emotionally abused: Not on file     Physically abused: Not on file     Forced sexual activity: Not on file   Other Topics Concern    Not on file   Social History Narrative    Not on file       Objective     Vitals:    21 1513   BP: 118/60   Pulse: 72   Resp: 16   Temp: (!) 97 1 °F (36 2 °C)   SpO2: 97%     Wt Readings from Last 3 Encounters:   03/09/21 77 9 kg (171 lb 12 8 oz)   08/19/20 75 6 kg (166 lb 9 6 oz)   02/07/20 79 9 kg (176 lb 3 2 oz)       Physical Exam  Vitals signs and nursing note reviewed  Constitutional:       General: He is not in acute distress  Appearance: Normal appearance  He is not ill-appearing, toxic-appearing or diaphoretic  HENT:      Head: Normocephalic and atraumatic  Eyes:      General: No scleral icterus  Conjunctiva/sclera: Conjunctivae normal    Neck:      Musculoskeletal: Normal range of motion and neck supple  No neck rigidity or muscular tenderness  Cardiovascular:      Rate and Rhythm: Normal rate and regular rhythm  Heart sounds: Normal heart sounds  No murmur  No friction rub  No gallop  Pulmonary:      Effort: Pulmonary effort is normal  No respiratory distress  Breath sounds: Normal breath sounds  No stridor  No wheezing, rhonchi or rales  Abdominal:      General: Abdomen is flat  Bowel sounds are normal  There is no distension  Palpations: Abdomen is soft  There is no mass  Tenderness: There is no abdominal tenderness  There is no guarding or rebound  Genitourinary:     Prostate: Enlarged  Not tender and no nodules present  Rectum: Normal       Comments: Prostate is +1 sized on exam, but is smooth throughout  No gross blood on the examining finger  Lymphadenopathy:      Cervical: No cervical adenopathy  Neurological:      Mental Status: He is alert and oriented to person, place, and time  Psychiatric:         Mood and Affect: Mood normal          Behavior: Behavior normal          Thought Content:  Thought content normal          Judgment: Judgment normal          Pertinent Laboratory/Diagnostic Studies:  Lab Results   Component Value Date    BUN 19 08/24/2020    CREATININE 0 83 08/24/2020    CALCIUM 9 2 08/24/2020     02/17/2016    K 4 4 08/24/2020    CO2 30 08/24/2020     08/24/2020 Lab Results   Component Value Date    ALT 19 08/24/2020    AST 21 08/24/2020    ALKPHOS 62 08/24/2020    BILITOT 0 6 02/17/2016       Lab Results   Component Value Date    WBC 7 30 08/06/2019    HGB 15 0 08/06/2019    HCT 45 1 08/06/2019    MCV 90 08/06/2019     08/06/2019       No results found for: TSH    Lab Results   Component Value Date    CHOL 269 (H) 02/17/2016     Lab Results   Component Value Date    TRIG 174 (H) 08/24/2020     Lab Results   Component Value Date    HDL 27 (L) 08/24/2020     Lab Results   Component Value Date    LDLCALC 133 (H) 08/24/2020     Lab Results   Component Value Date    HGBA1C 5 1 08/06/2019       Results for orders placed or performed in visit on 08/24/20   Novel Coronavirus (COVID-19), PCR [facility] - Collected at [facility] or [facility]    Specimen: Nose; Nares   Result Value Ref Range    SARS-CoV-2  Not Detected Not Detected       Orders Placed This Encounter   Procedures    influenza vaccine, quadrivalent, 0 5 mL, preservative-free, for adult and pediatric patients 6 mos+ (AFLURIA, FLUARIX, FLULAVAL, FLUZONE)    Comprehensive metabolic panel    Lipid Panel with Direct LDL reflex       ALLERGIES:  No Known Allergies    Current Medications     Current Outpatient Medications   Medication Sig Dispense Refill    acetaminophen (TYLENOL) 500 mg tablet Take 500 mg by mouth every 6 (six) hours as needed for mild pain      atorvastatin (LIPITOR) 20 mg tablet Take 1 tablet (20 mg total) by mouth daily 90 tablet 1    CHANTIX 1 MG tablet TAKE 1 TABLET BY MOUTH TWICE A  tablet 1    DULoxetine (CYMBALTA) 60 mg delayed release capsule TAKE 1 CAPSULE BY MOUTH EVERY DAY 90 capsule 1    fentaNYL (DURAGESIC) 100 mcg/hr TD 72 hr patch Place 1 patch on the skin every third dayMax Daily Amount: 1 patch 10 patch 0    gabapentin (NEURONTIN) 400 mg capsule Take 1 capsule (400 mg total) by mouth 3 (three) times a day 90 capsule 1    oxyCODONE (ROXICODONE) 5 mg immediate release tablet Take 1 tablet (5 mg total) by mouth every 6 (six) hours as needed for moderate pain or severe painMax Daily Amount: 20 mg (Patient not taking: Reported on 9/27/2019) 25 tablet 0    varenicline (CHANTIX TERRA) 0 5 MG X 11 & 1 MG X 42 tablet Take one 0 5mg tab by mouth 1x daily for 3 days, then increase to one 0 5mg tab 2x daily for 3 days, then increase to one 1mg tab 2x daily 53 tablet 0     No current facility-administered medications for this visit  Health Maintenance     Health Maintenance   Topic Date Due    BMI: Followup Plan  11/13/1982    Influenza Vaccine (1) 09/01/2020    Medicare Annual Wellness Visit (AWV)  11/04/2020    Hepatitis C Screening  03/09/2022 (Originally 1965)    HIV Screening  03/10/2023 (Originally 11/13/1979)    Depression Screening PHQ  03/09/2022    BMI: Adult  03/09/2022    DTaP,Tdap,and Td Vaccines (2 - Td) 11/20/2022    Colorectal Cancer Screening  08/13/2028    Pneumococcal Vaccine: Pediatrics (0 to 5 Years) and At-Risk Patients (6 to 59 Years)  Aged Out    HIB Vaccine  Aged Out    Hepatitis B Vaccine  Aged Out    IPV Vaccine  Aged Out    Hepatitis A Vaccine  Aged Out    Meningococcal ACWY Vaccine  Aged Out    HPV Vaccine  Aged [employer] History   Administered Date(s) Administered    INFLUENZA 10/29/2015, 10/01/2016, 08/28/2018    Influenza Quadrivalent Preservative Free 3 years and older IM 1965, 10/29/2015, 10/01/2016, 10/29/2017    Influenza, injectable, quadrivalent, preservative free 0 5 mL 03/09/2021    Influenza, recombinant, quadrivalent,injectable, preservative free 11/04/2019    Influenza, seasonal, injectable 11/20/2012    Tdap 11/20/2012     BMI Counseling: Body mass index is 26 53 kg/m²  The BMI is above normal  Nutrition recommendations include moderation in carbohydrate intake  Exercise recommendations include exercising 3-5 times per week  No pharmacotherapy was ordered   Patient referred to PCP due to patient being overweight             [address], DO

## 2021-03-09 NOTE — PATIENT INSTRUCTIONS

## 2021-03-15 DIAGNOSIS — G89.29 OTHER CHRONIC PAIN: ICD-10-CM

## 2021-03-15 RX ORDER — FENTANYL 100 UG/H
1 PATCH TRANSDERMAL
Qty: 10 PATCH | Refills: 0 | Status: SHIPPED | OUTPATIENT
Start: 2021-03-15 | End: 2021-04-14 | Stop reason: SDUPTHER

## 2021-03-15 NOTE — TELEPHONE ENCOUNTER
Medication:fentaNYL (DURAGESIC) 100 mcg/hr TD 72 hr patch      Dosage:  How Often:Sig: Place 1 patch on the skin every third dayMax Daily Amount: 1 patch  Quantity:  10  Last Office Visit: 03/9/2021  Next Office Visit:9/10/2021  Last refilled: Written 2/12/2021  How many pills left: ?  Pharmacy:   [facility] [address] [address]  Phone: [phone] Fax: [phone]

## 2021-04-14 DIAGNOSIS — G89.29 OTHER CHRONIC PAIN: ICD-10-CM

## 2021-04-14 RX ORDER — FENTANYL 100 UG/H
1 PATCH TRANSDERMAL
Qty: 10 PATCH | Refills: 0 | Status: SHIPPED | OUTPATIENT
Start: 2021-04-14 | End: 2021-05-14 | Stop reason: SDUPTHER

## 2021-04-14 NOTE — TELEPHONE ENCOUNTER
Medication:  PDMP   03/16/2021  1  03/15/2021  FENTANYL 100 MCG/HR PATCH  10 0  30  LO CIM       Active agreement on file -No

## 2021-04-14 NOTE — TELEPHONE ENCOUNTER
Medication:fentaNYL (DURAGESIC) 100 mcg/hr TD 72 hr patch      Dosage:  How Often:Sig: Place 1 patch on the skin every third dayMax Daily Amount: 1 patch  Quantity:  10  Last Office Visit: 3/9/2021  Next Office Visit:9/10/2021  Last refilled: 3/15/2021  How many pills left: 0  Pharmacy:   [facility] [address] [address]  Phone: [phone] Fax: [phone]

## 2021-05-14 DIAGNOSIS — G89.29 OTHER CHRONIC PAIN: ICD-10-CM

## 2021-05-14 RX ORDER — FENTANYL 100 UG/H
1 PATCH TRANSDERMAL
Qty: 10 PATCH | Refills: 0 | Status: SHIPPED | OUTPATIENT
Start: 2021-05-14 | End: 2021-06-14 | Stop reason: SDUPTHER

## 2021-05-14 NOTE — TELEPHONE ENCOUNTER
Medication:fentaNYL ([address])   Dosage:100 mcg/hr TD 72 hr patch  How Often:Place 1 patch on the skin every third dayMax Daily Amount: 1 patch  Quantity:  10  Last Office Visit: 3/9/2021  Next Office Visit:9/10/2022  Last refilled:4/14/2021  How many pills left: 0  Pharmacy:   [facility] [place], [place] [address]  [address] [address]  Phone: [phone] Fax: [phone]

## 2021-06-11 DIAGNOSIS — E78.49 OTHER HYPERLIPIDEMIA: Chronic | ICD-10-CM

## 2021-06-11 RX ORDER — ATORVASTATIN CALCIUM 20 MG/1
TABLET, FILM COATED ORAL
Qty: 90 TABLET | Refills: 1 | Status: SHIPPED | OUTPATIENT
Start: 2021-06-11 | End: 2021-08-16 | Stop reason: SDUPTHER

## 2021-06-14 DIAGNOSIS — G89.29 OTHER CHRONIC PAIN: ICD-10-CM

## 2021-06-14 RX ORDER — FENTANYL 100 UG/H
1 PATCH TRANSDERMAL
Qty: 10 PATCH | Refills: 0 | Status: SHIPPED | OUTPATIENT
Start: 2021-06-14 | End: 2021-07-14 | Stop reason: SDUPTHER

## 2021-06-14 NOTE — TELEPHONE ENCOUNTER
Medication:Fentanyl   Dosage:100mcg/hr 72hr patch  How Often:1 patch every 3 days  Quantity:  10  Last Office Visit: 3/9/21  Next Office Visit:9/10/21  Last refilled:05/14/2021  How many pills left:1  Pharmacy:   [facility]  [address] [facility] [address]  Phone: [phone] Fax: [phone]

## 2021-07-06 ENCOUNTER — APPOINTMENT (EMERGENCY)
Dept: RADIOLOGY | Facility: HOSPITAL | Age: 56
End: 2021-07-06
Payer: COMMERCIAL

## 2021-07-06 ENCOUNTER — HOSPITAL ENCOUNTER (EMERGENCY)
Facility: HOSPITAL | Age: 56
Discharge: HOME | End: 2021-07-06
Attending: EMERGENCY MEDICINE
Payer: COMMERCIAL

## 2021-07-06 VITALS
RESPIRATION RATE: 18 BRPM | SYSTOLIC BLOOD PRESSURE: 159 MMHG | HEART RATE: 84 BPM | BODY MASS INDEX: 24.7 KG/M2 | WEIGHT: 160 LBS | OXYGEN SATURATION: 99 % | DIASTOLIC BLOOD PRESSURE: 108 MMHG | TEMPERATURE: 98.5 F

## 2021-07-06 DIAGNOSIS — J06.9 VIRAL URI WITH COUGH: Primary | ICD-10-CM

## 2021-07-06 LAB — SARS-COV-2 RNA RESP QL NAA+PROBE: NEGATIVE

## 2021-07-06 PROCEDURE — 71045 X-RAY EXAM CHEST 1 VIEW: CPT

## 2021-07-06 PROCEDURE — U0003 INFECTIOUS AGENT DETECTION BY NUCLEIC ACID (DNA OR RNA); SEVERE ACUTE RESPIRATORY SYNDROME CORONAVIRUS 2 (SARS-COV-2) (CORONAVIRUS DISEASE [COVID-19]), AMPLIFIED PROBE TECHNIQUE, MAKING USE OF HIGH THROUGHPUT TECHNOLOGIES AS DESCRIBED BY CMS-2020-01-R: HCPCS | Performed by: PHYSICIAN ASSISTANT

## 2021-07-06 PROCEDURE — U0005 INFEC AGEN DETEC AMPLI PROBE: HCPCS | Performed by: PHYSICIAN ASSISTANT

## 2021-07-06 PROCEDURE — 99285 EMERGENCY DEPT VISIT HI MDM: CPT | Performed by: PHYSICIAN ASSISTANT

## 2021-07-06 PROCEDURE — 99283 EMERGENCY DEPT VISIT LOW MDM: CPT

## 2021-07-06 RX ORDER — ALBUTEROL SULFATE 90 UG/1
2 AEROSOL, METERED RESPIRATORY (INHALATION) EVERY 4 HOURS PRN
Qty: 6.7 G | Refills: 0 | Status: SHIPPED | OUTPATIENT
Start: 2021-07-06

## 2021-07-06 RX ORDER — FLUTICASONE PROPIONATE 50 MCG
1 SPRAY, SUSPENSION (ML) NASAL DAILY
Qty: 16 G | Refills: 0 | Status: SHIPPED | OUTPATIENT
Start: 2021-07-06 | End: 2021-09-15 | Stop reason: SDUPTHER

## 2021-07-06 RX ORDER — IBUPROFEN 400 MG/1
400 TABLET ORAL EVERY 6 HOURS PRN
Qty: 12 TABLET | Refills: 0 | Status: SHIPPED | OUTPATIENT
Start: 2021-07-06

## 2021-07-06 RX ORDER — ALBUTEROL SULFATE 90 UG/1
2 AEROSOL, METERED RESPIRATORY (INHALATION) ONCE
Status: COMPLETED | OUTPATIENT
Start: 2021-07-06 | End: 2021-07-06

## 2021-07-06 RX ORDER — PREDNISONE 20 MG/1
60 TABLET ORAL ONCE
Status: COMPLETED | OUTPATIENT
Start: 2021-07-06 | End: 2021-07-06

## 2021-07-06 RX ORDER — GUAIFENESIN/DEXTROMETHORPHAN 100-10MG/5
5 SYRUP ORAL 3 TIMES DAILY PRN
Qty: 118 ML | Refills: 0 | Status: SHIPPED | OUTPATIENT
Start: 2021-07-06 | End: 2021-07-16

## 2021-07-06 RX ORDER — ACETAMINOPHEN 500 MG
500 TABLET ORAL EVERY 6 HOURS PRN
Qty: 30 TABLET | Refills: 0 | Status: SHIPPED | OUTPATIENT
Start: 2021-07-06

## 2021-07-06 RX ORDER — PREDNISONE 20 MG/1
60 TABLET ORAL DAILY
Qty: 15 TABLET | Refills: 0 | Status: SHIPPED | OUTPATIENT
Start: 2021-07-06 | End: 2021-07-11

## 2021-07-06 RX ORDER — FLUTICASONE PROPIONATE 50 MCG
1 SPRAY, SUSPENSION (ML) NASAL DAILY
Status: DISCONTINUED | OUTPATIENT
Start: 2021-07-07 | End: 2021-07-06 | Stop reason: HOSPADM

## 2021-07-06 RX ADMIN — PREDNISONE 60 MG: 20 TABLET ORAL at 16:41

## 2021-07-06 RX ADMIN — ALBUTEROL SULFATE 2 PUFF: 90 AEROSOL, METERED RESPIRATORY (INHALATION) at 16:40

## 2021-07-06 NOTE — ED PROVIDER NOTES
History  Chief Complaint   Patient presents with    Cough     3 days     Patient is a 79-year-old male reports he has half pack smoker per day presents today for evaluation of a cough and congestion stone ongoing since he came [place] from the beach 3 days ago  Patient reports he has not been vaccinated for COVID-19 and describes a productive cough with sinus pressure  Patient denies any throat pain or ear pain reports no abdominal pain, nausea vomiting, fevers, chills  History provided by:  Patient  Cough  Cough characteristics:  Productive  Sputum characteristics:  Nondescript  Severity:  Moderate  Onset quality:  Gradual  Timing:  Constant  Progression:  Unchanged  Chronicity:  New  Smoker: yes    Relieved by:  None tried  Worsened by:  Nothing  Ineffective treatments:  None tried  Associated symptoms: rhinorrhea and sinus congestion    Associated symptoms: no chest pain, no chills, no ear pain, no fever, no rash, no shortness of breath and no sore throat        Prior to Admission Medications   Prescriptions Last Dose Informant Patient Reported? Taking?    CHANTIX 1 MG tablet   No No   Sig: TAKE 1 TABLET BY MOUTH TWICE A DAY   DULoxetine (CYMBALTA) 60 mg delayed release capsule   No No   Sig: TAKE 1 CAPSULE BY MOUTH EVERY DAY   acetaminophen (TYLENOL) 500 mg tablet   Yes No   Sig: Take 500 mg by mouth every 6 (six) hours as needed for mild pain   atorvastatin (LIPITOR) 20 mg tablet   No No   Sig: TAKE 1 TABLET BY MOUTH EVERY DAY   fentaNYL (DURAGESIC) 100 mcg/hr TD 72 hr patch   No No   Sig: Place 1 patch on the skin every third dayMax Daily Amount: 1 patch   gabapentin (NEURONTIN) 400 mg capsule   No No   Sig: Take 1 capsule (400 mg total) by mouth 3 (three) times a day   oxyCODONE (ROXICODONE) 5 mg immediate release tablet   No No   Sig: Take 1 tablet (5 mg total) by mouth every 6 (six) hours as needed for moderate pain or severe painMax Daily Amount: 20 mg   Patient not taking: Reported on 9/27/2019 varenicline (CHANTIX TERRA) 0 5 MG X 11 & 1 MG X 42 tablet   No No   Sig: Take one 0 5mg tab by mouth 1x daily for 3 days, then increase to one 0 5mg tab 2x daily for 3 days, then increase to one 1mg tab 2x daily      Facility-Administered Medications: None       Past Medical History:   Diagnosis Date    Anxiety and depression     Chronic back pain     Chronic pain disorder     Hypercholesterolemia     last assessed 16    Hyperlipidemia     Mastitis     Sinusitis        Past Surgical History:   Procedure Laterality Date    BACK SURGERY      back sx for fx + disc rupture w/ hardware    BACK SURGERY      lower    COLONOSCOPY      ME COLONOSCOPY FLX DX W/COLLJ SPEC WHEN PFRMD N/A 2018    Procedure: COLONOSCOPY;  Surgeon: Chris Magdaleno MD;  Location: [facility]; Service: Gastroenterology    ME PERCUT IMPLNT NEUROELECT,EPIDURAL Right 2019    Procedure: INSERTION THORACIC DORSAL COLUMN SPINAL CORD STIMULATOR PERCUTANEOUS, RIGHT BUTTOCK;  Surgeon: Aydee Mayer MD;  Location: [facility];  Service: Neurosurgery    SPINAL CORD STIMULATOR TRIAL W/ LAMINOTOMY         Family History   Problem Relation Age of Onset    Diabetes Mother         mellitus    Stroke Father         syndrome    Hypertension Family      I have reviewed and agree with the history as documented  E-Cigarette/Vaping     E-Cigarette/Vaping Substances     Social History     Tobacco Use    Smoking status: Former Smoker     Packs/day: 0 25     Types: Cigarettes     Quit date: 2020     Years since quittin 5    Smokeless tobacco: Never Used    Tobacco comment: encouraged smoking cessation   Substance Use Topics    Alcohol use: No    Drug use: No       Review of Systems   Constitutional: Negative for chills, fatigue and fever  HENT: Positive for congestion, postnasal drip and rhinorrhea  Negative for ear pain and sore throat  Eyes: Negative for redness  Respiratory: Positive for cough   Negative for chest tightness and shortness of breath  Cardiovascular: Negative for chest pain and palpitations  Gastrointestinal: Negative for abdominal pain, nausea and vomiting  Genitourinary: Negative for dysuria and hematuria  Musculoskeletal: Negative  Skin: Negative for rash  Neurological: Negative for dizziness, syncope, light-headedness and numbness  Physical Exam  Physical Exam  Vitals and nursing note reviewed  Constitutional:       Appearance: Normal appearance  He is well-developed  HENT:      Head: Normocephalic and atraumatic  Eyes:      General: No scleral icterus  Pupils: Pupils are equal, round, and reactive to light  Cardiovascular:      Rate and Rhythm: Normal rate and regular rhythm  Pulses: Normal pulses  Pulmonary:      Effort: Pulmonary effort is normal  No respiratory distress  Breath sounds: No stridor  Rhonchi ( noted on the left middle and lower lung) present  Abdominal:      General: There is no distension  Palpations: There is no mass  Musculoskeletal:      Cervical back: Normal range of motion  Skin:     General: Skin is warm and dry  Capillary Refill: Capillary refill takes less than 2 seconds  Coloration: Skin is not jaundiced  Neurological:      Mental Status: He is alert and oriented to person, place, and time        Gait: Gait normal    Psychiatric:         Mood and Affect: Mood normal          Vital Signs  ED Triage Vitals [07/06/21 1617]   Temperature Pulse Respirations Blood Pressure SpO2   98 5 °F (36 9 °C) 84 18 (!) 159/108 99 %      Temp Source Heart Rate Source Patient Position - Orthostatic VS BP Location FiO2 (%)   Tympanic Monitor Sitting Right arm --      Pain Score       --           Vitals:    07/06/21 1617   BP: (!) 159/108   Pulse: 84   Patient Position - Orthostatic VS: Sitting         Visual Acuity      ED Medications  Medications   fluticasone (FLONASE) 50 mcg/act nasal spray 1 spray (has no administration in time range)   albuterol (PROVENTIL HFA,VENTOLIN HFA) inhaler 2 puff (2 puffs Inhalation Given 7/6/21 1640)   predniSONE tablet 60 mg (60 mg Oral Given 7/6/21 1641)       Diagnostic Studies  Results Reviewed     Procedure Component Value Units Date/Time    Novel Coronavirus [facility] [[id number]] Collected: 07/06/21 1716    Lab Status: In process Specimen: Nares from Nose Updated: 07/06/21 1722                 XR chest portable   ED Interpretation by Chaz Mckeon PA-C (07/06 1701)   No acute consolidation                 Procedures  Procedures         ED Course  ED Course as of Jul 06 1727   Tue Jul 06, 2021   1702 Patient was reexamined at this time and informed of laboratory and/or imaging results and was found to be stable for discharge  Return to emergency department criteria was reviewed with the patient who verbalized understanding and was agreeable to discharge and the treatment plan at this time  SBIRT 22yo+      Most Recent Value   SBIRT (24 yo +)   In order to provide better care to our patients, we are screening all of our patients for alcohol and drug use  Would it be okay to ask you these screening questions? No Filed at: 07/06/2021 1623                    MDM  Number of Diagnoses or Management Options  Diagnosis management comments: All imaging and/or lab testing discussed with patient, strict return to ED precautions discussed  Patient recommended to follow up promptly with appropriate outpatient provider  Patient and/or family members verbalizes understanding and agrees with plan  Patient is stable for discharge      Portions of the record may have been created with voice recognition software  Occasional wrong word or "sound a like" substitutions may have occurred due to the inherent limitations of voice recognition software  Read the chart carefully and recognize, using context, where substitutions have occurred          Disposition  Final diagnoses: Viral URI with cough     Time reflects when diagnosis was documented in both MDM as applicable and the Disposition within this note     Time User Action Codes Description Comment    7/6/2021  5:03 PM Braeden Salgado [J06 9] Viral URI with cough       ED Disposition     ED Disposition Condition Date/Time Comment    Discharge Good Tue Jul 6, 2021  5:03 PM Jessi Thad discharge to home/self care              Follow-up Information     Follow up With Specialties Details Why [address] DO jR Family Medicine Schedule an appointment as soon as possible for a visit in 2 days As needed [address]      Moris Maynard MD Family Medicine Schedule an appointment as soon as possible for a visit in 2 days  [address]  [phone]            Discharge Medication List as of 7/6/2021  5:04 PM      START taking these medications    Details   !! acetaminophen (TYLENOL) 500 mg tablet Take 1 tablet (500 mg total) by mouth every 6 (six) hours as needed for mild pain, Starting Tue 7/6/2021, Normal      albuterol (PROVENTIL HFA,VENTOLIN HFA) 90 mcg/act inhaler Inhale 2 puffs every 4 (four) hours as needed for wheezing, Starting Tue 7/6/2021, Normal      dextromethorphan-guaiFENesin (ROBITUSSIN DM)  mg/5 mL syrup Take 5 mL by mouth 3 (three) times a day as needed (cough) for up to 10 days, Starting Tue 7/6/2021, Until Fri 7/16/2021 at 2359, Normal      fluticasone (FLONASE) 50 mcg/act nasal spray 1 spray into each nostril daily, Starting Tue 7/6/2021, Normal      ibuprofen (MOTRIN) 400 mg tablet Take 1 tablet (400 mg total) by mouth every 6 (six) hours as needed for mild pain, Starting Tue 7/6/2021, Normal      menthol-cetylpyridinium (CEPACOL) 3 MG lozenge Take 1 lozenge (3 mg total) by mouth as needed for sore throat, Starting Tue 7/6/2021, Normal      predniSONE 20 mg tablet Take 3 tablets (60 mg total) by mouth daily for 5 days, Starting Tue 7/6/2021, Until Sun 7/11/2021, Normal       !! - Potential duplicate medications found  Please discuss with provider  CONTINUE these medications which have NOT CHANGED    Details   !! acetaminophen (TYLENOL) 500 mg tablet Take 500 mg by mouth every 6 (six) hours as needed for mild pain, Historical Med      atorvastatin (LIPITOR) 20 mg tablet TAKE 1 TABLET BY MOUTH EVERY DAY, Normal      CHANTIX 1 MG tablet TAKE 1 TABLET BY MOUTH TWICE A DAY, Normal      DULoxetine (CYMBALTA) 60 mg delayed release capsule TAKE 1 CAPSULE BY MOUTH EVERY DAY, Normal      fentaNYL (DURAGESIC) 100 mcg/hr TD 72 hr patch Place 1 patch on the skin every third dayMax Daily Amount: 1 patch, Starting Mon 6/14/2021, Normal      gabapentin (NEURONTIN) 400 mg capsule Take 1 capsule (400 mg total) by mouth 3 (three) times a day, Starting Tue 2/26/2019, Normal      oxyCODONE (ROXICODONE) 5 mg immediate release tablet Take 1 tablet (5 mg total) by mouth every 6 (six) hours as needed for moderate pain or severe painMax Daily Amount: 20 mg, Starting Mon 8/12/2019, Normal      varenicline (CHANTIX TERRA) 0 5 MG X 11 & 1 MG X 42 tablet Take one 0 5mg tab by mouth 1x daily for 3 days, then increase to one 0 5mg tab 2x daily for 3 days, then increase to one 1mg tab 2x daily, Normal       !! - Potential duplicate medications found  Please discuss with provider  No discharge procedures on file      PDMP Review       Value Time User    PDMP Reviewed  Yes 7/29/2020  2:16 PM [facility] ED Provider  Electronically Signed by           Barbara Bell PA-C  07/06/21 4429

## 2021-07-14 DIAGNOSIS — G89.29 OTHER CHRONIC PAIN: ICD-10-CM

## 2021-07-14 RX ORDER — FENTANYL 100 UG/H
1 PATCH TRANSDERMAL
Qty: 10 PATCH | Refills: 0 | Status: SHIPPED | OUTPATIENT
Start: 2021-07-14 | End: 2021-08-16 | Stop reason: SDUPTHER

## 2021-07-14 NOTE — TELEPHONE ENCOUNTER
Medication:Fentanyl patches  Dosage:100mcg  How Often: Every 3 days  Quantity:  10  Last Office Visit: 03/09/2021  Next Office Visit:09/10/2021  Last refilled:06/14/2021  How many pills left:1  Pharmacy:   [facility]  [address] [facility] [address]  Phone: [phone] Fax: [phone]

## 2021-08-16 DIAGNOSIS — G89.29 OTHER CHRONIC PAIN: ICD-10-CM

## 2021-08-16 DIAGNOSIS — E78.49 OTHER HYPERLIPIDEMIA: Chronic | ICD-10-CM

## 2021-08-16 RX ORDER — ATORVASTATIN CALCIUM 20 MG/1
20 TABLET, FILM COATED ORAL DAILY
Qty: 90 TABLET | Refills: 1 | Status: SHIPPED | OUTPATIENT
Start: 2021-08-16 | End: 2021-09-15 | Stop reason: SDUPTHER

## 2021-08-16 RX ORDER — FENTANYL 100 UG/H
1 PATCH TRANSDERMAL
Qty: 10 PATCH | Refills: 0 | Status: SHIPPED | OUTPATIENT
Start: 2021-08-16 | End: 2021-10-04 | Stop reason: SDUPTHER

## 2021-08-16 NOTE — TELEPHONE ENCOUNTER
Medication:fentaNYL (DURAGESIC) 100 mcg/hr TD 72 hr patch   How Often:Sig: Place 1 patch on the skin every third dayMax Daily Amount: 1 patch  Quantity:  10  Last Office Visit: 03/09/2021  Next Office Visit: 09/10/2021  Last refilled: 07/14/2021  Pharmacy:   [facility], [address] [place] [address]  Phone: [phone] Fax: [phone]           Medication:  atorvastatin (LIPITOR) 20 mg tablet    How Often:  Sig: TAKE 1 TABLET BY MOUTH EVERY DAY             Quantity:  90  Last Office Visit: 03/09/2021  Next Office Visit: 09/10/2021  Last refilled: 06/11/2021  Pharmacy:   [facility], [address] [place] [address] [address] [address]  Phone: [phone] Fax: [phone]

## 2021-09-01 ENCOUNTER — RA CDI HCC (OUTPATIENT)
Dept: OTHER | Facility: HOSPITAL | Age: 56
End: 2021-09-01

## 2021-09-01 NOTE — PROGRESS NOTES
[facility] 75  coding opportunities          Number of diagnosis code(s) already on the problem list added to FYI fla     Chart Reviewed * (Number of) Inbasket suggestions sent to Provider: 1     DX: F11 20 Opioid dependence, uncomplicated            Patients insurance company: [employer] (Medicare Advantage only)           Provider never responded to [place] [address]  coding request, and pt canceled the appt

## 2021-09-15 DIAGNOSIS — G89.29 OTHER CHRONIC PAIN: ICD-10-CM

## 2021-09-15 DIAGNOSIS — E78.49 OTHER HYPERLIPIDEMIA: Chronic | ICD-10-CM

## 2021-09-15 DIAGNOSIS — J06.9 VIRAL URI WITH COUGH: ICD-10-CM

## 2021-09-15 RX ORDER — FLUTICASONE PROPIONATE 50 MCG
1 SPRAY, SUSPENSION (ML) NASAL DAILY
Qty: 16 G | Refills: 0 | Status: SHIPPED | OUTPATIENT
Start: 2021-09-15 | End: 2021-09-29

## 2021-09-15 RX ORDER — ATORVASTATIN CALCIUM 20 MG/1
20 TABLET, FILM COATED ORAL DAILY
Qty: 90 TABLET | Refills: 1 | Status: SHIPPED | OUTPATIENT
Start: 2021-09-15 | End: 2021-11-09 | Stop reason: SDUPTHER

## 2021-09-15 NOTE — TELEPHONE ENCOUNTER
Medication: fentaNYL (DURAGESIC      Dosage: 100 mcg/hr TD 72 hr patch [      How Often: Place 1 patch on the skin every third dayMax Daily Amount: 1 patch  Quantity:  10  Last Office Visit: 03/09/21  Next Office Visit: none  Last refilled: 08/16/21  How many pills left: none  Pharmacy:   [facility], [address] [address]  Phone: [phone] Fax: [phone]    Medication: atorvastatin (LIPITOR  Dosage: 20 mg tablet   How Often: Take 1 tablet (20 mg total) by mouth daily  Quantity:  90  Last Office Visit: 03/09/21  Next Office Visit: none  Last refilled:08/16/21  How many pills left: 2  Pharmacy:   [facility], [address] [address]  Phone: [phone] Fax: [phone]

## 2021-09-15 NOTE — TELEPHONE ENCOUNTER
Requested medication(s) are due for refill today: Yes  Patient has already received a courtesy refill: No  Other reason request has been forwarded to provider: per protocol    Patient is requesting refill on Fentanyl patch, PDMP shows it was filled 9/1/2021  This writer called [facility] pharmacy and they stated patient picked up prescription on 9/3/21, quantity of 10

## 2021-09-28 DIAGNOSIS — J06.9 VIRAL URI WITH COUGH: ICD-10-CM

## 2021-09-29 RX ORDER — FLUTICASONE PROPIONATE 50 MCG
SPRAY, SUSPENSION (ML) NASAL
Qty: 48 ML | Refills: 1 | Status: SHIPPED | OUTPATIENT
Start: 2021-09-29

## 2021-10-04 DIAGNOSIS — G89.29 OTHER CHRONIC PAIN: ICD-10-CM

## 2021-10-04 RX ORDER — FENTANYL 100 UG/H
1 PATCH TRANSDERMAL
Qty: 10 PATCH | Refills: 0 | Status: SHIPPED | OUTPATIENT
Start: 2021-10-04 | End: 2021-11-09 | Stop reason: SDUPTHER

## 2021-11-09 DIAGNOSIS — G89.29 OTHER CHRONIC PAIN: ICD-10-CM

## 2021-11-09 DIAGNOSIS — E78.49 OTHER HYPERLIPIDEMIA: Chronic | ICD-10-CM

## 2021-11-09 RX ORDER — ATORVASTATIN CALCIUM 20 MG/1
20 TABLET, FILM COATED ORAL DAILY
Qty: 90 TABLET | Refills: 1 | Status: SHIPPED | OUTPATIENT
Start: 2021-11-09 | End: 2022-01-20 | Stop reason: SDUPTHER

## 2021-11-09 RX ORDER — FENTANYL 100 UG/H
1 PATCH TRANSDERMAL
Qty: 10 PATCH | Refills: 0 | Status: SHIPPED | OUTPATIENT
Start: 2021-11-09 | End: 2021-11-24 | Stop reason: SDUPTHER

## 2021-11-17 ENCOUNTER — TELEPHONE (OUTPATIENT)
Dept: FAMILY MEDICINE CLINIC | Facility: CLINIC | Age: 56
End: 2021-11-17

## 2021-11-23 ENCOUNTER — RA CDI HCC (OUTPATIENT)
Dept: OTHER | Facility: HOSPITAL | Age: 56
End: 2021-11-23

## 2021-11-24 DIAGNOSIS — G89.29 OTHER CHRONIC PAIN: ICD-10-CM

## 2021-11-24 RX ORDER — FENTANYL 100 UG/H
1 PATCH TRANSDERMAL
Qty: 10 PATCH | Refills: 0 | Status: SHIPPED | OUTPATIENT
Start: 2021-11-24 | End: 2022-01-20 | Stop reason: SDUPTHER

## 2021-12-09 ENCOUNTER — OFFICE VISIT (OUTPATIENT)
Dept: FAMILY MEDICINE CLINIC | Facility: CLINIC | Age: 56
End: 2021-12-09
Payer: MEDICARE

## 2021-12-09 VITALS
TEMPERATURE: 98.9 F | RESPIRATION RATE: 14 BRPM | WEIGHT: 156.4 LBS | SYSTOLIC BLOOD PRESSURE: 120 MMHG | OXYGEN SATURATION: 98 % | HEART RATE: 68 BPM | BODY MASS INDEX: 24.55 KG/M2 | HEIGHT: 67 IN | DIASTOLIC BLOOD PRESSURE: 86 MMHG

## 2021-12-09 DIAGNOSIS — Z12.5 SCREENING FOR PROSTATE CANCER: ICD-10-CM

## 2021-12-09 DIAGNOSIS — Z13.1 SCREENING FOR DIABETES MELLITUS: ICD-10-CM

## 2021-12-09 DIAGNOSIS — F41.9 ANXIETY AND DEPRESSION: ICD-10-CM

## 2021-12-09 DIAGNOSIS — F32.A ANXIETY AND DEPRESSION: ICD-10-CM

## 2021-12-09 DIAGNOSIS — E78.49 OTHER HYPERLIPIDEMIA: ICD-10-CM

## 2021-12-09 DIAGNOSIS — G89.4 CHRONIC PAIN SYNDROME: Primary | ICD-10-CM

## 2021-12-09 PROCEDURE — 3725F SCREEN DEPRESSION PERFORMED: CPT | Performed by: FAMILY MEDICINE

## 2021-12-09 PROCEDURE — 3008F BODY MASS INDEX DOCD: CPT | Performed by: FAMILY MEDICINE

## 2021-12-09 PROCEDURE — 1036F TOBACCO NON-USER: CPT | Performed by: FAMILY MEDICINE

## 2021-12-09 PROCEDURE — 99214 OFFICE O/P EST MOD 30 MIN: CPT | Performed by: FAMILY MEDICINE

## 2022-01-20 DIAGNOSIS — G89.29 OTHER CHRONIC PAIN: ICD-10-CM

## 2022-01-20 DIAGNOSIS — E78.49 OTHER HYPERLIPIDEMIA: Chronic | ICD-10-CM

## 2022-01-20 RX ORDER — FENTANYL 100 UG/H
1 PATCH TRANSDERMAL
Qty: 10 PATCH | Refills: 0 | Status: SHIPPED | OUTPATIENT
Start: 2022-01-20 | End: 2022-02-02 | Stop reason: SDUPTHER

## 2022-01-20 RX ORDER — ATORVASTATIN CALCIUM 20 MG/1
20 TABLET, FILM COATED ORAL DAILY
Qty: 90 TABLET | Refills: 1 | Status: SHIPPED | OUTPATIENT
Start: 2022-01-20 | End: 2022-02-25

## 2022-01-20 NOTE — TELEPHONE ENCOUNTER
Medication:  PDMP   11/24/2021  2  11/24/2021  FENTANYL 100 MCG/HR PATCH  10 0  30  LO CIM     Active agreement on file -No

## 2022-01-31 ENCOUNTER — TELEPHONE (OUTPATIENT)
Dept: OTHER | Facility: OTHER | Age: 57
End: 2022-01-31

## 2022-01-31 NOTE — TELEPHONE ENCOUNTER
[facility] is requesting a call back regarding prior authorization for fentaNYL (DURAGESIC) 100 mcg/hr TD 72 hr patch

## 2022-02-01 NOTE — TELEPHONE ENCOUNTER
Pt's daughter called with the Part D information:     ID: [id number]  BIN: [id number]  PCN: Part [id number]  Group: [id number]    Pt's daughter to fax a copy of the Part D card   Confirmed, no managed MC and no supplemental

## 2022-02-01 NOTE — TELEPHONE ENCOUNTER
The insurance is not recognizing the Pt as a participating Pt  Called Pt to request a copy of the card sent to the office via fax or [address]   Pt will have his daughter call back to assist with request

## 2022-02-01 NOTE — TELEPHONE ENCOUNTER
Pt's daughter called to check on the status of the prior auth  Advised that Pt will be contacted once complete and a response is received re: auth

## 2022-02-02 NOTE — TELEPHONE ENCOUNTER
[facility] called and was inquiring on the PA status of this medication  I told her it looked like the office was getting more insurance information and that the script was pending  Dr Maliha Welch sent over a script but it does not say delivered, Can someone check on the status of the refill for the patient?

## 2022-02-03 NOTE — TELEPHONE ENCOUNTER
We needed to fax the script over to the insurance company to get approved  We have not heard anything yet    That was faxed over on 2/3

## 2022-02-25 DIAGNOSIS — E78.49 OTHER HYPERLIPIDEMIA: Chronic | ICD-10-CM

## 2022-02-25 RX ORDER — ATORVASTATIN CALCIUM 20 MG/1
TABLET, FILM COATED ORAL
Qty: 90 TABLET | Refills: 1 | Status: SHIPPED | OUTPATIENT
Start: 2022-02-25

## (undated) DEVICE — PROGRAMMER PATIENT THERAPHY MANAGER RS2

## (undated) DEVICE — Device

## (undated) DEVICE — COVER BOW FRAME SKIN CARE KIT

## (undated) DEVICE — PREP SURGICAL PURPREP 26ML

## (undated) DEVICE — UTILITY MARKER,BLACK WITH LABELS: Brand: DEVON

## (undated) DEVICE — INTENDED FOR TISSUE SEPARATION, AND OTHER PROCEDURES THAT REQUIRE A SHARP SURGICAL BLADE TO PUNCTURE OR CUT.: Brand: BARD-PARKER SAFETY BLADES SIZE 10, STERILE

## (undated) DEVICE — TIBURON SPLIT SHEET: Brand: CONVERTORS

## (undated) DEVICE — RX COUDÉ® EPIDURAL NEEDLE 14G TW X 4.0": Brand: EPIMED

## (undated) DEVICE — GLOVE SRG BIOGEL ECLIPSE 7

## (undated) DEVICE — 3M™ IOBAN™ 2 ANTIMICROBIAL INCISE DRAPE 6650EZ: Brand: IOBAN™ 2

## (undated) DEVICE — ADHESIVE SKN CLSR HISTOACRYL FLEX 0.5ML LF

## (undated) DEVICE — SUT SILK 2-0 SH 30 IN K833H

## (undated) DEVICE — RECHARGER PRGRMR THERAPHY MANAGER RS2

## (undated) DEVICE — VIAL DECANTER

## (undated) DEVICE — GLOVE INDICATOR PI UNDERGLOVE SZ 6.5 BLUE

## (undated) DEVICE — ANTIBACTERIAL VIOLET BRAIDED (POLYGLACTIN 910), SYNTHETIC ABSORBABLE SUTURE: Brand: COATED VICRYL

## (undated) DEVICE — BETHLEHEM UNIVERSAL MINOR GEN: Brand: CARDINAL HEALTH

## (undated) DEVICE — PROGRAMER EXTERNAL WIRELESS NEURO STIM RS2

## (undated) DEVICE — GLOVE SRG BIOGEL 6.5

## (undated) DEVICE — DRAPE C-ARM X-RAY

## (undated) DEVICE — PENCIL ELECTROSURG E-Z CLEAN -0035H

## (undated) DEVICE — GLOVE INDICATOR PI UNDERGLOVE SZ 7.5 BLUE

## (undated) DEVICE — ELECTRODE BLADE MOD E-Z CLEAN 2.5IN 6.4CM -0012M

## (undated) DEVICE — SPONGE PVP SCRUB WING STERILE